# Patient Record
Sex: FEMALE | Race: WHITE | NOT HISPANIC OR LATINO
[De-identification: names, ages, dates, MRNs, and addresses within clinical notes are randomized per-mention and may not be internally consistent; named-entity substitution may affect disease eponyms.]

---

## 2017-01-01 ENCOUNTER — RECORDS - HEALTHEAST (OUTPATIENT)
Dept: ADMINISTRATIVE | Facility: OTHER | Age: 77
End: 2017-01-01

## 2017-01-01 ENCOUNTER — OFFICE VISIT - HEALTHEAST (OUTPATIENT)
Dept: PODIATRY | Facility: CLINIC | Age: 77
End: 2017-01-01

## 2017-01-01 ENCOUNTER — COMMUNICATION - HEALTHEAST (OUTPATIENT)
Dept: NURSING | Facility: CLINIC | Age: 77
End: 2017-01-01

## 2017-01-01 ENCOUNTER — OFFICE VISIT - HEALTHEAST (OUTPATIENT)
Dept: SLEEP MEDICINE | Facility: CLINIC | Age: 77
End: 2017-01-01

## 2017-01-01 ENCOUNTER — COMMUNICATION - HEALTHEAST (OUTPATIENT)
Dept: INTERNAL MEDICINE | Facility: CLINIC | Age: 77
End: 2017-01-01

## 2017-01-01 ENCOUNTER — AMBULATORY - HEALTHEAST (OUTPATIENT)
Dept: LAB | Facility: CLINIC | Age: 77
End: 2017-01-01

## 2017-01-01 ENCOUNTER — COMMUNICATION - HEALTHEAST (OUTPATIENT)
Dept: SCHEDULING | Facility: CLINIC | Age: 77
End: 2017-01-01

## 2017-01-01 ENCOUNTER — OFFICE VISIT - HEALTHEAST (OUTPATIENT)
Dept: INTERNAL MEDICINE | Facility: CLINIC | Age: 77
End: 2017-01-01

## 2017-01-01 ENCOUNTER — COMMUNICATION - HEALTHEAST (OUTPATIENT)
Dept: MEDSURG UNIT | Facility: CLINIC | Age: 77
End: 2017-01-01

## 2017-01-01 ENCOUNTER — HOSPITAL ENCOUNTER (OUTPATIENT)
Dept: CT IMAGING | Facility: CLINIC | Age: 77
Discharge: HOME | End: 2017-09-11
Attending: INTERNAL MEDICINE

## 2017-01-01 ENCOUNTER — COMMUNICATION - HEALTHEAST (OUTPATIENT)
Dept: TELEHEALTH | Facility: CLINIC | Age: 77
End: 2017-01-01

## 2017-01-01 ENCOUNTER — HOSPITAL ENCOUNTER (OUTPATIENT)
Dept: CARDIOLOGY | Facility: CLINIC | Age: 77
Discharge: HOME | End: 2017-06-23
Attending: INTERNAL MEDICINE

## 2017-01-01 DIAGNOSIS — I63.9 ISCHEMIC STROKE (H): ICD-10-CM

## 2017-01-01 DIAGNOSIS — R21 SKIN RASH: ICD-10-CM

## 2017-01-01 DIAGNOSIS — G40.209 COMPLEX PARTIAL SEIZURES (H): ICD-10-CM

## 2017-01-01 DIAGNOSIS — G40.201 PARTIAL SYMPTOMATIC EPILEPSY WITH COMPLEX PARTIAL SEIZURES, NOT INTRACTABLE, WITH STATUS EPILEPTICUS (H): ICD-10-CM

## 2017-01-01 DIAGNOSIS — I82.409 DVT (DEEP VENOUS THROMBOSIS) (H): ICD-10-CM

## 2017-01-01 DIAGNOSIS — I63.511 CEREBRAL INFARCTION INVOLVING RIGHT MIDDLE CEREBRAL ARTERY (H): ICD-10-CM

## 2017-01-01 DIAGNOSIS — R06.83 SNORING: ICD-10-CM

## 2017-01-01 DIAGNOSIS — F06.31 MOOD DISORDER WITH DEPRESSIVE FEATURES DUE TO GENERAL MEDICAL CONDITION: ICD-10-CM

## 2017-01-01 DIAGNOSIS — R29.818 SUSPECTED SLEEP APNEA: ICD-10-CM

## 2017-01-01 DIAGNOSIS — L60.2 ONYCHAUXIS: ICD-10-CM

## 2017-01-01 DIAGNOSIS — I48.0 PAROXYSMAL ATRIAL FIBRILLATION (H): ICD-10-CM

## 2017-01-01 DIAGNOSIS — G47.10 HYPERSOMNIA, UNSPECIFIED: ICD-10-CM

## 2017-01-01 DIAGNOSIS — G47.8 SLEEP DYSFUNCTION WITH SLEEP STAGE DISTURBANCE: ICD-10-CM

## 2017-01-01 DIAGNOSIS — D64.9 ANEMIA, UNSPECIFIED TYPE: ICD-10-CM

## 2017-01-01 DIAGNOSIS — R56.9 SEIZURE (H): ICD-10-CM

## 2017-01-01 DIAGNOSIS — G40.201 STATUS EPILEPTICUS DUE TO COMPLEX PARTIAL SEIZURE (H): ICD-10-CM
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
## 2017-01-01 DIAGNOSIS — R56.9 SEIZURES (H): ICD-10-CM

## 2017-01-01 DIAGNOSIS — Z23 IMMUNIZATION DUE: ICD-10-CM

## 2017-01-01 DIAGNOSIS — B35.1 NAIL FUNGUS: ICD-10-CM

## 2017-01-01 DIAGNOSIS — I10 ESSENTIAL HYPERTENSION: ICD-10-CM

## 2017-01-01 DIAGNOSIS — R50.9 FEVER: ICD-10-CM

## 2017-01-01 ASSESSMENT — MIFFLIN-ST. JEOR
SCORE: 1230.65
SCORE: 1230.65
SCORE: 1214.78
SCORE: 1219.31
SCORE: 1207.97
SCORE: 1207.97
SCORE: 1217.05
SCORE: 1198.9

## 2017-03-21 ENCOUNTER — APPOINTMENT (OUTPATIENT)
Dept: CT IMAGING | Facility: CLINIC | Age: 77
DRG: 061 | End: 2017-03-21
Attending: EMERGENCY MEDICINE
Payer: MEDICARE

## 2017-03-21 ENCOUNTER — APPOINTMENT (OUTPATIENT)
Dept: INTERVENTIONAL RADIOLOGY/VASCULAR | Facility: CLINIC | Age: 77
DRG: 061 | End: 2017-03-21
Attending: PSYCHIATRY & NEUROLOGY
Payer: MEDICARE

## 2017-03-21 ENCOUNTER — HOSPITAL ENCOUNTER (INPATIENT)
Facility: CLINIC | Age: 77
LOS: 10 days | Discharge: NURSING, REHAB OR LONG-TERM CARE | DRG: 061 | End: 2017-03-31
Attending: EMERGENCY MEDICINE | Admitting: INTERNAL MEDICINE
Payer: MEDICARE

## 2017-03-21 DIAGNOSIS — I51.7 ATRIAL DILATATION, LEFT: ICD-10-CM

## 2017-03-21 DIAGNOSIS — I63.411 CEREBRAL INFARCTION DUE TO EMBOLISM OF RIGHT MIDDLE CEREBRAL ARTERY (H): Primary | ICD-10-CM

## 2017-03-21 DIAGNOSIS — I10 BENIGN ESSENTIAL HYPERTENSION: ICD-10-CM

## 2017-03-21 DIAGNOSIS — E78.2 MIXED HYPERLIPIDEMIA: ICD-10-CM

## 2017-03-21 DIAGNOSIS — E55.9 VITAMIN D DEFICIENCY: ICD-10-CM

## 2017-03-21 DIAGNOSIS — G93.6 CEREBRAL EDEMA (H): ICD-10-CM

## 2017-03-21 DIAGNOSIS — I63.9 ACUTE ISCHEMIC STROKE (H): ICD-10-CM

## 2017-03-21 LAB
ANION GAP SERPL CALCULATED.3IONS-SCNC: 7 MMOL/L (ref 3–14)
APTT PPP: 29 SEC (ref 22–37)
BASOPHILS # BLD AUTO: 0 10E9/L (ref 0–0.2)
BASOPHILS NFR BLD AUTO: 0.7 %
BUN SERPL-MCNC: 23 MG/DL (ref 7–30)
CALCIUM SERPL-MCNC: 8.5 MG/DL (ref 8.5–10.1)
CHLORIDE SERPL-SCNC: 108 MMOL/L (ref 94–109)
CHOLEST SERPL-MCNC: 207 MG/DL
CO2 SERPL-SCNC: 28 MMOL/L (ref 20–32)
CREAT SERPL-MCNC: 0.8 MG/DL (ref 0.52–1.04)
CRP SERPL-MCNC: <2.9 MG/L (ref 0–8)
DIFFERENTIAL METHOD BLD: NORMAL
EOSINOPHIL # BLD AUTO: 0.1 10E9/L (ref 0–0.7)
EOSINOPHIL NFR BLD AUTO: 2 %
ERYTHROCYTE [DISTWIDTH] IN BLOOD BY AUTOMATED COUNT: 13.5 % (ref 10–15)
ERYTHROCYTE [SEDIMENTATION RATE] IN BLOOD BY WESTERGREN METHOD: 8 MM/H (ref 0–30)
GFR SERPL CREATININE-BSD FRML MDRD: 70 ML/MIN/1.7M2
GLUCOSE BLDC GLUCOMTR-MCNC: 120 MG/DL (ref 70–99)
GLUCOSE BLDC GLUCOMTR-MCNC: 157 MG/DL (ref 70–99)
GLUCOSE SERPL-MCNC: 167 MG/DL (ref 70–99)
HBA1C MFR BLD: 5.9 % (ref 4.3–6)
HCT VFR BLD AUTO: 40.4 % (ref 35–47)
HDLC SERPL-MCNC: 73 MG/DL
HGB BLD-MCNC: 13.7 G/DL (ref 11.7–15.7)
IMM GRANULOCYTES # BLD: 0 10E9/L (ref 0–0.4)
IMM GRANULOCYTES NFR BLD: 0.2 %
INR PPP: 0.95 (ref 0.86–1.14)
LDLC SERPL CALC-MCNC: 108 MG/DL
LYMPHOCYTES # BLD AUTO: 1.7 10E9/L (ref 0.8–5.3)
LYMPHOCYTES NFR BLD AUTO: 38.5 %
MCH RBC QN AUTO: 32.5 PG (ref 26.5–33)
MCHC RBC AUTO-ENTMCNC: 33.9 G/DL (ref 31.5–36.5)
MCV RBC AUTO: 96 FL (ref 78–100)
MONOCYTES # BLD AUTO: 0.3 10E9/L (ref 0–1.3)
MONOCYTES NFR BLD AUTO: 7.6 %
NEUTROPHILS # BLD AUTO: 2.3 10E9/L (ref 1.6–8.3)
NEUTROPHILS NFR BLD AUTO: 51 %
NONHDLC SERPL-MCNC: 134 MG/DL
NRBC # BLD AUTO: 0 10*3/UL
NRBC BLD AUTO-RTO: 0 /100
PLATELET # BLD AUTO: 187 10E9/L (ref 150–450)
POTASSIUM SERPL-SCNC: 3.8 MMOL/L (ref 3.4–5.3)
RBC # BLD AUTO: 4.22 10E12/L (ref 3.8–5.2)
SODIUM SERPL-SCNC: 143 MMOL/L (ref 133–144)
TRIGL SERPL-MCNC: 130 MG/DL
TROPONIN I SERPL-MCNC: <0.015 UG/L (ref 0–0.04)
TSH SERPL DL<=0.05 MIU/L-ACNC: 1.89 MU/L (ref 0.4–4)
WBC # BLD AUTO: 4.5 10E9/L (ref 4–11)

## 2017-03-21 PROCEDURE — 27210906 ZZH KIT CR8

## 2017-03-21 PROCEDURE — 70450 CT HEAD/BRAIN W/O DYE: CPT

## 2017-03-21 PROCEDURE — 86140 C-REACTIVE PROTEIN: CPT | Performed by: EMERGENCY MEDICINE

## 2017-03-21 PROCEDURE — C1769 GUIDE WIRE: HCPCS

## 2017-03-21 PROCEDURE — 25000128 H RX IP 250 OP 636: Performed by: EMERGENCY MEDICINE

## 2017-03-21 PROCEDURE — B3131ZZ FLUOROSCOPY OF RIGHT COMMON CAROTID ARTERY USING LOW OSMOLAR CONTRAST: ICD-10-PCS | Performed by: RADIOLOGY

## 2017-03-21 PROCEDURE — 27210806 ZZH SHEATH CR5

## 2017-03-21 PROCEDURE — 25000125 ZZHC RX 250

## 2017-03-21 PROCEDURE — 82607 VITAMIN B-12: CPT | Performed by: EMERGENCY MEDICINE

## 2017-03-21 PROCEDURE — 70498 CT ANGIOGRAPHY NECK: CPT

## 2017-03-21 PROCEDURE — 25500064 ZZH RX 255 OP 636: Performed by: EMERGENCY MEDICINE

## 2017-03-21 PROCEDURE — 80048 BASIC METABOLIC PNL TOTAL CA: CPT | Performed by: EMERGENCY MEDICINE

## 2017-03-21 PROCEDURE — 27211192 ZZ H SHEATH CR14

## 2017-03-21 PROCEDURE — 85025 COMPLETE CBC W/AUTO DIFF WBC: CPT | Performed by: EMERGENCY MEDICINE

## 2017-03-21 PROCEDURE — 25500064 ZZH RX 255 OP 636: Performed by: RADIOLOGY

## 2017-03-21 PROCEDURE — 25000125 ZZHC RX 250: Performed by: PSYCHIATRY & NEUROLOGY

## 2017-03-21 PROCEDURE — 85652 RBC SED RATE AUTOMATED: CPT | Performed by: EMERGENCY MEDICINE

## 2017-03-21 PROCEDURE — 27210732 ZZH ACCESSORY CR1

## 2017-03-21 PROCEDURE — 25000125 ZZHC RX 250: Performed by: EMERGENCY MEDICINE

## 2017-03-21 PROCEDURE — 80061 LIPID PANEL: CPT | Performed by: EMERGENCY MEDICINE

## 2017-03-21 PROCEDURE — 25000128 H RX IP 250 OP 636: Performed by: RADIOLOGY

## 2017-03-21 PROCEDURE — 83036 HEMOGLOBIN GLYCOSYLATED A1C: CPT | Performed by: EMERGENCY MEDICINE

## 2017-03-21 PROCEDURE — 27210804 ZZH SHEATH CR3

## 2017-03-21 PROCEDURE — 85610 PROTHROMBIN TIME: CPT | Performed by: EMERGENCY MEDICINE

## 2017-03-21 PROCEDURE — 99223 1ST HOSP IP/OBS HIGH 75: CPT | Mod: AI | Performed by: INTERNAL MEDICINE

## 2017-03-21 PROCEDURE — 20000003 ZZH R&B ICU

## 2017-03-21 PROCEDURE — 84484 ASSAY OF TROPONIN QUANT: CPT | Performed by: PSYCHIATRY & NEUROLOGY

## 2017-03-21 PROCEDURE — S0028 INJECTION, FAMOTIDINE, 20 MG: HCPCS | Performed by: INTERNAL MEDICINE

## 2017-03-21 PROCEDURE — 25000128 H RX IP 250 OP 636

## 2017-03-21 PROCEDURE — 84443 ASSAY THYROID STIM HORMONE: CPT | Performed by: EMERGENCY MEDICINE

## 2017-03-21 PROCEDURE — 25000125 ZZHC RX 250: Performed by: RADIOLOGY

## 2017-03-21 PROCEDURE — 3E03317 INTRODUCTION OF OTHER THROMBOLYTIC INTO PERIPHERAL VEIN, PERCUTANEOUS APPROACH: ICD-10-PCS | Performed by: EMERGENCY MEDICINE

## 2017-03-21 PROCEDURE — 70470 CT HEAD/BRAIN W/O & W/DYE: CPT | Mod: XS

## 2017-03-21 PROCEDURE — 93010 ELECTROCARDIOGRAM REPORT: CPT | Performed by: INTERNAL MEDICINE

## 2017-03-21 PROCEDURE — 25000125 ZZHC RX 250: Performed by: INTERNAL MEDICINE

## 2017-03-21 PROCEDURE — 00000146 ZZHCL STATISTIC GLUCOSE BY METER IP

## 2017-03-21 PROCEDURE — 82306 VITAMIN D 25 HYDROXY: CPT | Performed by: EMERGENCY MEDICINE

## 2017-03-21 PROCEDURE — 85730 THROMBOPLASTIN TIME PARTIAL: CPT | Performed by: EMERGENCY MEDICINE

## 2017-03-21 PROCEDURE — 93005 ELECTROCARDIOGRAM TRACING: CPT

## 2017-03-21 PROCEDURE — 36223 PLACE CATH CAROTID/INOM ART: CPT

## 2017-03-21 PROCEDURE — 27210742 ZZH CATH CR1

## 2017-03-21 PROCEDURE — 27210893 ZZH CATH CR5

## 2017-03-21 RX ORDER — HYDRALAZINE HYDROCHLORIDE 20 MG/ML
10 INJECTION INTRAMUSCULAR; INTRAVENOUS ONCE
Status: COMPLETED | OUTPATIENT
Start: 2017-03-21 | End: 2017-03-21

## 2017-03-21 RX ORDER — LABETALOL HYDROCHLORIDE 5 MG/ML
10-20 INJECTION, SOLUTION INTRAVENOUS EVERY 10 MIN PRN
Status: DISCONTINUED | OUTPATIENT
Start: 2017-03-21 | End: 2017-03-30

## 2017-03-21 RX ORDER — FENTANYL CITRATE 50 UG/ML
INJECTION, SOLUTION INTRAMUSCULAR; INTRAVENOUS
Status: COMPLETED
Start: 2017-03-21 | End: 2017-03-21

## 2017-03-21 RX ORDER — IOPAMIDOL 755 MG/ML
120 INJECTION, SOLUTION INTRAVASCULAR ONCE
Status: COMPLETED | OUTPATIENT
Start: 2017-03-21 | End: 2017-03-21

## 2017-03-21 RX ORDER — PROCHLORPERAZINE MALEATE 5 MG
5 TABLET ORAL EVERY 6 HOURS PRN
Status: DISCONTINUED | OUTPATIENT
Start: 2017-03-21 | End: 2017-03-31 | Stop reason: HOSPADM

## 2017-03-21 RX ORDER — METOCLOPRAMIDE 5 MG/1
5 TABLET ORAL EVERY 6 HOURS PRN
Status: DISCONTINUED | OUTPATIENT
Start: 2017-03-21 | End: 2017-03-31 | Stop reason: HOSPADM

## 2017-03-21 RX ORDER — HYDRALAZINE HYDROCHLORIDE 20 MG/ML
INJECTION INTRAMUSCULAR; INTRAVENOUS
Status: COMPLETED
Start: 2017-03-21 | End: 2017-03-21

## 2017-03-21 RX ORDER — ONDANSETRON 2 MG/ML
4 INJECTION INTRAMUSCULAR; INTRAVENOUS EVERY 6 HOURS PRN
Status: DISCONTINUED | OUTPATIENT
Start: 2017-03-21 | End: 2017-03-21

## 2017-03-21 RX ORDER — LIDOCAINE HYDROCHLORIDE 10 MG/ML
1-30 INJECTION, SOLUTION EPIDURAL; INFILTRATION; INTRACAUDAL; PERINEURAL
Status: COMPLETED | OUTPATIENT
Start: 2017-03-21 | End: 2017-03-21

## 2017-03-21 RX ORDER — POTASSIUM CHLORIDE 1500 MG/1
20-40 TABLET, EXTENDED RELEASE ORAL
Status: DISCONTINUED | OUTPATIENT
Start: 2017-03-21 | End: 2017-03-28

## 2017-03-21 RX ORDER — PROCHLORPERAZINE MALEATE 5 MG
5 TABLET ORAL EVERY 6 HOURS PRN
Status: DISCONTINUED | OUTPATIENT
Start: 2017-03-21 | End: 2017-03-21

## 2017-03-21 RX ORDER — IOPAMIDOL 612 MG/ML
150 INJECTION, SOLUTION INTRAVASCULAR ONCE
Status: COMPLETED | OUTPATIENT
Start: 2017-03-21 | End: 2017-03-21

## 2017-03-21 RX ORDER — HYDRALAZINE HYDROCHLORIDE 20 MG/ML
INJECTION INTRAMUSCULAR; INTRAVENOUS
Status: DISCONTINUED
Start: 2017-03-21 | End: 2017-03-21 | Stop reason: HOSPADM

## 2017-03-21 RX ORDER — POTASSIUM CHLORIDE 7.45 MG/ML
10 INJECTION INTRAVENOUS
Status: DISCONTINUED | OUTPATIENT
Start: 2017-03-21 | End: 2017-03-28

## 2017-03-21 RX ORDER — AMOXICILLIN 250 MG
1-2 CAPSULE ORAL 2 TIMES DAILY PRN
Status: DISCONTINUED | OUTPATIENT
Start: 2017-03-21 | End: 2017-03-31 | Stop reason: HOSPADM

## 2017-03-21 RX ORDER — ACETAMINOPHEN 10 MG/ML
1000 INJECTION, SOLUTION INTRAVENOUS ONCE
Status: COMPLETED | OUTPATIENT
Start: 2017-03-21 | End: 2017-03-21

## 2017-03-21 RX ORDER — SODIUM CHLORIDE 9 MG/ML
INJECTION, SOLUTION INTRAVENOUS CONTINUOUS
Status: DISCONTINUED | OUTPATIENT
Start: 2017-03-21 | End: 2017-03-30

## 2017-03-21 RX ORDER — BISACODYL 10 MG
10 SUPPOSITORY, RECTAL RECTAL DAILY PRN
Status: DISCONTINUED | OUTPATIENT
Start: 2017-03-21 | End: 2017-03-31 | Stop reason: HOSPADM

## 2017-03-21 RX ORDER — SODIUM CHLORIDE 9 MG/ML
INJECTION, SOLUTION INTRAVENOUS CONTINUOUS
Status: DISCONTINUED | OUTPATIENT
Start: 2017-03-21 | End: 2017-03-21 | Stop reason: ALTCHOICE

## 2017-03-21 RX ORDER — MAGNESIUM SULFATE HEPTAHYDRATE 40 MG/ML
4 INJECTION, SOLUTION INTRAVENOUS EVERY 4 HOURS PRN
Status: DISCONTINUED | OUTPATIENT
Start: 2017-03-21 | End: 2017-03-31 | Stop reason: HOSPADM

## 2017-03-21 RX ORDER — HYDRALAZINE HYDROCHLORIDE 20 MG/ML
10-20 INJECTION INTRAMUSCULAR; INTRAVENOUS EVERY 30 MIN PRN
Status: DISCONTINUED | OUTPATIENT
Start: 2017-03-21 | End: 2017-03-31 | Stop reason: HOSPADM

## 2017-03-21 RX ORDER — NALOXONE HYDROCHLORIDE 0.4 MG/ML
.1-.4 INJECTION, SOLUTION INTRAMUSCULAR; INTRAVENOUS; SUBCUTANEOUS
Status: DISCONTINUED | OUTPATIENT
Start: 2017-03-21 | End: 2017-03-21

## 2017-03-21 RX ORDER — ONDANSETRON 4 MG/1
4 TABLET, ORALLY DISINTEGRATING ORAL EVERY 6 HOURS PRN
Status: DISCONTINUED | OUTPATIENT
Start: 2017-03-21 | End: 2017-03-31 | Stop reason: HOSPADM

## 2017-03-21 RX ORDER — POTASSIUM CHLORIDE 29.8 MG/ML
20 INJECTION INTRAVENOUS
Status: DISCONTINUED | OUTPATIENT
Start: 2017-03-21 | End: 2017-03-28

## 2017-03-21 RX ORDER — ONDANSETRON 4 MG/1
4 TABLET, ORALLY DISINTEGRATING ORAL EVERY 6 HOURS PRN
Status: DISCONTINUED | OUTPATIENT
Start: 2017-03-21 | End: 2017-03-21

## 2017-03-21 RX ORDER — POTASSIUM CHLORIDE 1.5 G/1.58G
20-40 POWDER, FOR SOLUTION ORAL
Status: DISCONTINUED | OUTPATIENT
Start: 2017-03-21 | End: 2017-03-28

## 2017-03-21 RX ORDER — PROCHLORPERAZINE 25 MG
12.5 SUPPOSITORY, RECTAL RECTAL EVERY 12 HOURS PRN
Status: DISCONTINUED | OUTPATIENT
Start: 2017-03-21 | End: 2017-03-31 | Stop reason: HOSPADM

## 2017-03-21 RX ORDER — ACETAMINOPHEN 650 MG/1
650 SUPPOSITORY RECTAL EVERY 4 HOURS PRN
Status: DISCONTINUED | OUTPATIENT
Start: 2017-03-21 | End: 2017-03-31 | Stop reason: HOSPADM

## 2017-03-21 RX ORDER — FENTANYL CITRATE 50 UG/ML
25-50 INJECTION, SOLUTION INTRAMUSCULAR; INTRAVENOUS EVERY 5 MIN PRN
Status: DISCONTINUED | OUTPATIENT
Start: 2017-03-21 | End: 2017-03-21

## 2017-03-21 RX ORDER — LABETALOL HYDROCHLORIDE 5 MG/ML
10-20 INJECTION, SOLUTION INTRAVENOUS EVERY 10 MIN PRN
Status: DISCONTINUED | OUTPATIENT
Start: 2017-03-21 | End: 2017-03-21

## 2017-03-21 RX ORDER — LIDOCAINE 40 MG/G
CREAM TOPICAL
Status: DISCONTINUED | OUTPATIENT
Start: 2017-03-21 | End: 2017-03-31 | Stop reason: HOSPADM

## 2017-03-21 RX ORDER — PROCHLORPERAZINE 25 MG
12.5 SUPPOSITORY, RECTAL RECTAL EVERY 12 HOURS PRN
Status: DISCONTINUED | OUTPATIENT
Start: 2017-03-21 | End: 2017-03-21

## 2017-03-21 RX ORDER — METOCLOPRAMIDE HYDROCHLORIDE 5 MG/ML
5 INJECTION INTRAMUSCULAR; INTRAVENOUS EVERY 6 HOURS PRN
Status: DISCONTINUED | OUTPATIENT
Start: 2017-03-21 | End: 2017-03-31 | Stop reason: HOSPADM

## 2017-03-21 RX ORDER — NALOXONE HYDROCHLORIDE 0.4 MG/ML
.1-.4 INJECTION, SOLUTION INTRAMUSCULAR; INTRAVENOUS; SUBCUTANEOUS
Status: DISCONTINUED | OUTPATIENT
Start: 2017-03-21 | End: 2017-03-31 | Stop reason: HOSPADM

## 2017-03-21 RX ORDER — ONDANSETRON 2 MG/ML
4 INJECTION INTRAMUSCULAR; INTRAVENOUS EVERY 6 HOURS PRN
Status: DISCONTINUED | OUTPATIENT
Start: 2017-03-21 | End: 2017-03-31 | Stop reason: HOSPADM

## 2017-03-21 RX ORDER — FLUMAZENIL 0.1 MG/ML
0.2 INJECTION, SOLUTION INTRAVENOUS
Status: DISCONTINUED | OUTPATIENT
Start: 2017-03-21 | End: 2017-03-21

## 2017-03-21 RX ADMIN — HYDRALAZINE HYDROCHLORIDE 20 MG: 20 INJECTION INTRAMUSCULAR; INTRAVENOUS at 19:28

## 2017-03-21 RX ADMIN — FENTANYL CITRATE 50 MCG: 50 INJECTION, SOLUTION INTRAMUSCULAR; INTRAVENOUS at 15:11

## 2017-03-21 RX ADMIN — SODIUM CHLORIDE: 9 INJECTION, SOLUTION INTRAVENOUS at 17:17

## 2017-03-21 RX ADMIN — FENTANYL CITRATE 50 MCG: 50 INJECTION, SOLUTION INTRAMUSCULAR; INTRAVENOUS at 15:12

## 2017-03-21 RX ADMIN — FENTANYL CITRATE 25 MCG: 50 INJECTION, SOLUTION INTRAMUSCULAR; INTRAVENOUS at 15:35

## 2017-03-21 RX ADMIN — IOPAMIDOL 20 ML: 612 INJECTION, SOLUTION INTRAVENOUS at 15:48

## 2017-03-21 RX ADMIN — ONDANSETRON HYDROCHLORIDE 4 MG: 2 SOLUTION INTRAMUSCULAR; INTRAVENOUS at 17:13

## 2017-03-21 RX ADMIN — FAMOTIDINE 20 MG: 10 INJECTION, SOLUTION INTRAVENOUS at 20:03

## 2017-03-21 RX ADMIN — MIDAZOLAM HYDROCHLORIDE 0.5 MG: 1 INJECTION, SOLUTION INTRAMUSCULAR; INTRAVENOUS at 15:34

## 2017-03-21 RX ADMIN — HYDRALAZINE HYDROCHLORIDE 20 MG: 20 INJECTION INTRAMUSCULAR; INTRAVENOUS at 16:54

## 2017-03-21 RX ADMIN — ACETAMINOPHEN 1000 MG: 10 INJECTION, SOLUTION INTRAVENOUS at 20:06

## 2017-03-21 RX ADMIN — HYDRALAZINE HYDROCHLORIDE 10 MG: 20 INJECTION INTRAMUSCULAR; INTRAVENOUS at 15:39

## 2017-03-21 RX ADMIN — HYDRALAZINE HYDROCHLORIDE 10 MG: 20 INJECTION INTRAMUSCULAR; INTRAVENOUS at 23:46

## 2017-03-21 RX ADMIN — LIDOCAINE HYDROCHLORIDE 100 MG: 10 INJECTION, SOLUTION EPIDURAL; INFILTRATION; INTRACAUDAL; PERINEURAL at 15:13

## 2017-03-21 RX ADMIN — FENTANYL CITRATE 50 MCG: 50 INJECTION, SOLUTION INTRAMUSCULAR; INTRAVENOUS at 15:19

## 2017-03-21 RX ADMIN — IOPAMIDOL 120 ML: 755 INJECTION, SOLUTION INTRAVENOUS at 13:52

## 2017-03-21 RX ADMIN — MIDAZOLAM HYDROCHLORIDE 1 MG: 1 INJECTION, SOLUTION INTRAMUSCULAR; INTRAVENOUS at 15:09

## 2017-03-21 RX ADMIN — ALTEPLASE 7 MG: KIT at 14:21

## 2017-03-21 RX ADMIN — ALTEPLASE 66 MG: KIT at 14:24

## 2017-03-21 RX ADMIN — SODIUM CHLORIDE 100 ML: 9 INJECTION, SOLUTION INTRAVENOUS at 13:52

## 2017-03-21 ASSESSMENT — PAIN DESCRIPTION - DESCRIPTORS: DESCRIPTORS: ACHING

## 2017-03-21 ASSESSMENT — ACTIVITIES OF DAILY LIVING (ADL)
BATHING: 4-->COMPLETELY DEPENDENT
CHANGE_IN_FUNCTIONAL_STATUS_SINCE_ONSET_OF_CURRENT_ILLNESS/INJURY: YES
EATING: 4-->COMPLETELY DEPENDENT
DRESS: 4-->COMPLETELY DEPENDENT
TRANSFERRING: 4-->COMPLETELY DEPENDENT
BATHING: 0-->INDEPENDENT
COGNITION: 0 - NO COGNITION ISSUES REPORTED
DRESS: 0-->INDEPENDENT
RETIRED_COMMUNICATION: 0-->UNDERSTANDS/COMMUNICATES WITHOUT DIFFICULTY
AMBULATION: 4-->COMPLETELY DEPENDENT
TOILETING: 4-->COMPLETELY DEPENDENT
FALL_HISTORY_WITHIN_LAST_SIX_MONTHS: NO
COMMUNICATION: 2-->DIFFICULTY UNDERSTANDING (NOT RELATED TO LANGUAGE BARRIER)
RETIRED_EATING: 0-->INDEPENDENT
SWALLOWING: 0-->SWALLOWS FOODS/LIQUIDS WITHOUT DIFFICULTY
TRANSFERRING: 0-->INDEPENDENT
TOILETING: 0-->INDEPENDENT
AMBULATION: 0-->INDEPENDENT

## 2017-03-21 NOTE — ED NOTES
Bed: ST01  Expected date:   Expected time:   Means of arrival:   Comments:  435  75 F l sided weak/onset [phone]

## 2017-03-21 NOTE — ED PROVIDER NOTES
History     Chief Complaint:  Left-Sided Weakness     HPI The history is limited due to the patient's mental status changes and is obtained through EMS.     Marcia Kelley is a 76 year old female who presents via EMS for evaluation of left-sided weakness. This morning, the patient was shopping at [facility]. At 1250 while in the dressing room, the patient experienced the sudden onset of left-sided weakness, left-sided facial droop, confusion and altered mental status, and she fell to the ground. Staff at [facility] heard the patient fall and called EMS to evaluate the patient. On their evaluation, the patient was responsive but confused, could not move her left arm, and expressed no complaints. She had a blood sugar of 154 and 150 with EMS. Upon arrival here, the patient has persistent symptoms, expresses no complaints, and states that she is still at [facility].     Allergies:  NKDA     Medications:    The patient is not currently taking any prescribed medications.     Past Medical History:    History reviewed. No pertinent past medical history.     Past Surgical History:    History reviewed. No pertinent past surgical history.     Family History:    History reviewed. No pertinent family history.     Social History:  Tobacco use:    Never smoker  Alcohol use:    Positive   Accompanied to ED by:  EMS      Review of Systems   Unable to perform ROS: Mental status change     Physical Exam   First Vitals:    Physical Exam    Patient Vitals for the past 24 hrs:   BP Temp Temp src Pulse Heart Rate Resp SpO2 Height Weight   03/21/17 1618 - - - - 51 10 99 % - -   03/21/17 1615 166/82 - - - 52 13 99 % - -   03/21/17 1612 172/63 - - - 52 (!) 72 94 % - -   03/21/17 1540 141/79 - - - 44 14 94 % - -   03/21/17 1530 (!) 177/108 - - - 57 16 96 % - -   03/21/17 1520 169/87 - - - 44 14 95 % - -   03/21/17 1509 (!) 180/99 - - - 58 12 94 % - -   03/21/17 1500 (!) 206/117 - - - 71 12 96 % - -   03/21/17 1450 (!) 179/92 -  "- - 58 12 - - -   03/21/17 1445 (!) 184/103 - - - - - - - -   03/21/17 1433 - - - - 53 14 (!) 89 % - -   03/21/17 1430 166/90 - - - 58 16 100 % - -   03/21/17 1407 (!) 154/98 98.4  F (36.9  C) Oral 70 70 18 97 % - -   03/21/17 1400 (!) 154/98 - - - 71 14 96 % - -   03/21/17 1357 166/82 - - - - - 97 % - -   03/21/17 1300 - - - - - - - 1.676 m (5' 6\") 81.6 kg (179 lb 14.3 oz)       General: Alert and Interactive.   Head: No signs of trauma.   Mouth/Throat: Oropharynx is clear and moist.   Eyes: Conjunctivae are normal. Pupils are equal, round, and reactive to light.   Neck: Normal range of motion. No nuchal rigidity.   CV: Normal rate and regular rhythm.    Resp: Effort normal and breath sounds normal. No respiratory distress.   GI: Soft. There is no tenderness or guarding.   MSK: Normal range of motion. no edema.   Neuro: The patient is alert and oriented to person and time. PERRLA, No movement in left arm, Left-sided neglect, Lateral left gaze is limited, Clear speech  Sensation normal.  GCS eye subscore is 4. GCS verbal subscore is 5. GCS motor subscore is 6.   Skin: Skin is warm and dry. No rash noted.   Psych: normal mood and affect. Unusual apathy    Emergency Department Course   ECG (14:08:29):  Indication: Screening for cardiovascular disease.   Rate 69 bpm. AK interval 166 ms. QRS duration 112 ms. QT/QTc 442/473 ms. P-R-T axes 60 -52 66.   Interpretation: Sinus rhythm with occasional premature ventricular complexes, Left axis deviation, Minimal voltage criteria for LVH may be normal variant, Inferior infarct age undetermined, Anterior infarct age undetermined, Abnormal ECG  Agree with computer interpretation. Yes  Interpreted at 1413 by Dr. Amezcua.      Imaging:  Radiographic findings were communicated with the patient who voiced understanding of the findings.    CT Head w Contrast:  IMPRESSION: Moderate to large right posterior division middle cerebral artery territory matched defect consistent with " infarct.  Per radiology.     CTA Angiogram Head Neck:   IMPRESSION: Filling defect versus short segment high-grade stenosis involving the distal right M1 and proximal M2 branch with poor filling of a right M2 branch. Finding is probably due to a thromboembolus.  Preliminary report per radiology.     CT Head w/o Contrast:  IMPRESSION: Minimal chronic changes. No evidence for intracranial hemorrhage or any acute process.  Per radiology.     Laboratory:  CBC: WNL (WBC 4.5, HGB 13.7, )  BMP: Glucose 167 high, o/w WNL (Creatinine 0.80)    INR: 0.95  Partial thromboplastin time: 29     Interventions:  1421 Activase 7 mg IV   1424 Activase 66 mg IV     Emergency Department Course:  Patient was brought to the ED via EMS.     Nursing notes and vitals reviewed.  1323: I performed an exam of the patient as documented above.     1325: Code stroke initiated     1329: I spoke with Dr. Murdock of the neurology service regarding patient's presentation, findings, and plan of care.     1422: I spoke with Dr. Jj Allen of the interventional radiology service regarding patient's presentation, findings, and plan of care.    1513: I spoke with Dr. Moore of the hospitalist service regarding patient's presentation, findings, and plan of care.    The patient will be taken to the IR suite by Dr. Allen for definitive treatment and will be admitted to the ICU under Dr. Moore.     Impression & Plan      CMS Diagnoses: The patient has stroke symptoms:           ED Stroke specific documentation           NIHSS PDF          Protocol PDF     Patient last known well time: 1250  ED Provider first to bedside at: 1323  CT Results received at: 1402  Patient was treated with TPA.  The risks and benefits of TPA were reviewed using the risk information document.  These risks included bleeding complications such as intracranial bleeding and death. The patient or patient s surrogate s decisional capacity was assessed to be intact. TPA  decision was made after this informed consent.    National Institutes of Health Stroke Scale (Baseline)  Time Performed: 1323      Score    Level of consciousness: (0)   Alert, keenly responsive    LOC questions: (0)   Answers both questions correctly    LOC commands: (0)   Performs both tasks correctly    Best gaze: (1)   Partial gaze palsy    Visual: (0)   No visual loss    Facial palsy: (3)   Complete paralysis of one or both sides    Motor arm (left): (4)   No movement    Motor arm (right): (0)   No drift    Motor leg (left): (2)   Some effort against gravity    Motor leg (right): (0)   No drift    Limb ataxia: (1)   Present in one limb    Sensory: (0)   Normal- no sensory loss    Best language: (0)   Normal- no aphasia    Dysarthria: (0)   Normal    Extinction and inattention: (1)   Visual, tacile, auditory, spatial, person inattention        Total Score:  12        Stroke Mimics were considered (including migraine headache, seizure disorder, hypoglycemia (or hyperglycemia), head or spinal trauma, CNS infection, Toxin ingestion and shock state (e.g. sepsis) .      Medical Decision Making:  Marcia Kelley is a 76 year old female who is presenting to the ER with symptoms consistent with an acute stroke causing left-sided symptoms. The patient was quickly evaluated and a code stroke was called. The perfusion studies confirmed a large infarct. Thrombolytics indicated with no contraindications. Dr. Murdock from neurology spoke with the patient's family and consented the patient for thrombolytics. They made the decision to try thrombolytics to improve the patient's symptoms. They are aware of the risks including bleeding. After angiogram was obtained and once M2 and M3 segments looked concerning for persistent clot, recommendation was made for IR thrombectomy. I spoke with Dr. Elia Allen, who was willing to take the patient to the operating suite for definitive treatment. The patient was transferred to IR. I then  made arrangements for the patient to be admitted from there to the ICU under the care of Dr. Moore.     Diagnosis:    ICD-10-CM   1. Acute ischemic stroke (H) I63.9       Disposition:  The patient will be taken to the IR suite by Dr. Allen for definitive treatment and will be admitted to the ICU under Dr. Moore.     I, Supa Moon, am serving as a scribe at 1:23 PM on 3/21/2017 to document services personally performed by Dr. Amezcua, based on my observations and the provider's statements to me.      EMERGENCY DEPARTMENT       Brennon Amezcua MD  03/21/17 1084

## 2017-03-21 NOTE — PROCEDURES
Interventional Neuroradiology Post Procedure    Patient Name: Marcia Kelley  MRN: [id number]  Date of Procedure: March 21, 2017    Procedure: Carotid and cerebral angiogram  Radiologist: Ilia Snyder    Contrast: 20 ml Isovue 320  Fluoro Time: 9.0 minutes  Air Kerma: 639 mGy  Medications: Versed 3.5 mg IV                       Fentanyl 175 mcg IV  Sedation Time: 30 minutes    EBL: min  Complications: none    Preliminary Report:   (See dictation for full detail)  Complete occlusion of posterior division of R MCA, anterior division patent  Arch tortuosity with significant 360 degree loop in prox R CCA. Only able to advance guide catheter to proximal portion of loop which is insufficiently distal for mechanical thrombectomy. No thrombectomy performed.    Assess/Plan:  7 Fr sheath in place. Out in a.m., bedrest while sheath in place  Report to Dr. Mathieu Snyder MD  [phone]

## 2017-03-21 NOTE — PROGRESS NOTES
Interventional Radiology Intra-procedural Nursing Note    Patient Name: Marcia Kelley  Medical Record Number: [id number]  Today's Date: March 21, 2017    Start Time:  1511  End of procedure time: 1555  Procedure:  Cerebral Angiogram with stroke intervention  Report given to:  Masha ICU RN  Time pt departs:  1558  :  N/A    Other Notes:   Patient into IR#3 at 1445.  Informed consent obtained by MD Megan and signed by patient .   Patient anxious about procedure.  Patient prepped and draped in supine position with 2% Chlorhexidine.  Left arm weak and right gaze noted.  Patient slow to respond but A & O.  Rhythm initially questionable afib and converted to SB at 1509.    IV Alteplase completed at 1516.  Course of events:  Sheath Time: 1516, Clot ID: [id number].  No thrombectomy performed.  7Fr  RFA sheath site C/D/I post-procedure with small hematoma noted.  Peripheral pulses palpable.  Sheath to be removed in am 3/22.  Patient received 3.5 mg Versed and 175 mcg Fentanyl for sedation during procedure.  Transported to ICU post-procedure by flying squad RN and transport.    Jolie Lunsford, RN, BSN, CCRN

## 2017-03-21 NOTE — CONSULTS
"Neurology Consultation Note          Assessment and Plan:     Right MCA occlusion and stroke syndrome    Hypertension    Within window for IV tPA. Unknown risk factors at this point, possibly embolic. Patient is not a reliable source of information and  also denies that she has any chronic problems. She has been on one aspirin daily.  is at home 40 minutes away, so I called him and discussed the curent diagnosis and findings and recommended treatment. I quoted him and the patient a 30% chance of improved outcome vs. A 4% risk of life threatening hemorrhage. Considering the right M3 occlusion mechanical thrombectomy following IV thrombolysis may be an option if no resolution of the clot. Discussed all these in detail over the phone with her  who agreed to the treatment and he is on his way in.     4:30 pm: Patient had IV tPA without much improvement so she was taken to the specials suite and Dr. Snyder attempted mechanical thrombectomy, but due to a carotid loop he could not reach far enough to where the clot was. The patient's symptoms are at this point unchanged. Additional information was obtained from the patient's . The patient \"does not believe that doctors can help with anything\" so she has seen her PMD very sporadically and has never complied with recommendations. She has early cardiovascular disease on both sides of the family.    Total critical care time 90 minutes: patient in critical condition with life and severe disability threatening acute illness. Proposed and implemented treatments carry a high risk of mortality and morbidity. Reviewed all imaging studies and conferred with ED staff and INR staff. Explained situation and proposed treatments and risks to patient and her .         History of Present Illness:   I am asked to provide urgent neurological consultation for this 76 year old lady who presented with acute stroke symptoms starting at 12:50 pm earlier today. " She apparently was at a bridal store, when she was seen collapsing and paramedics were called, who found her cognizant, but with left sided weakness. She was brought into the ED and a head CT was normal, but CT with contrast showed a large portion of the MCA being hypoperfused, with a occlusion vs. High grade stenosis in the right posterior branch of the MCA. We do not have much information about her medical history. She says she has no chronic problems or taking medications besides one aspirin a day. Her  confirms that over the phone. She provided CHARLES to acces her [facility] record, which is the only information available through care everywhere, but sarah was only optometry information there.          Review of Systems:   Review of systems not obtained due to patient factors - critical condition          Past Medical History:   As above details are not available. She denies any chronic illnesses         Past Surgical History:   Same as above.         Medications:       alteplase  7 mg Intravenous Once    Followed by     alteplase  66 mg Intravenous Once     sodium chloride 0.9%  100 mL Intravenous Once     - MEDICATION INSTRUCTIONS -                   Allergies:   None known         Family History:   Denies cardiac or stroke or seizure family history.  states taht her father  of heart attack in his sixties and her mother from same cause in her early 70's.         Social History:    She is , lives with  and has adult children  Social History     Social History     Marital status: N/A     Spouse name: N/A     Number of children: N/A     Years of education: N/A     Occupational History     Not on file.     Social History Main Topics     Smoking status: Never Smoker     Smokeless tobacco: Not on file     Alcohol use Yes     Drug use: No     Sexual activity: Not on file     Other Topics Concern     Not on file     Social History Narrative     No narrative on file                 Physical  "Exam:   Vitals were reviewed  Blood pressure (!) 154/98, pulse 70, resp. rate 18, height 1.676 m (5' 6\"), weight 81.6 kg (179 lb 14.3 oz), SpO2 97 %.  Wt Readings from Last 4 Encounters:   17 81.6 kg (179 lb 14.3 oz)     Temperatures:  Current -  ; Max - No Data Recorded  Blood pressure range: Systolic (24hrs), Av , Min:154 , Max:154   ; Diastolic (24hrs), Av, Min:98, Max:98       General appearance:  Obviously having left sided neglect.  Cardiovascular:  NORMAL - regular rate and rhythm without murmur. and carotids with brisk upstroke/without bruit bilaterally  easily palpated bilaterally  Neurologic: NIHSS score is 11   Mental Status Exam:   Level of Alertness:   awake  Orientation:   normal to person, place, time  Memory:   normal for age  Fund of Knowledge:  normal  Attention/Concentration:  Left hemiinattention and neglect; does not recognize deficits.  Language:  normal  Cranial Nerves: Visual fields: left homonymous hemianopsia; EOM: intact; Pupils: PERRL; Fundi: No papilledema; V, VII: left sided deficits in both; tongue, palate: to the left; shoulder and neck muscle strength: less on left.  Motor Exam:  moves all extremities, but there is drift on the left.  Sensory:  Left hemihypesthesia to all modalities.  Coordination: finger/Nose:  right:  normal; left:  ataxic; heel-Knee-Shin:  right:  normal; left:  ataxic.  Gait: unable..  Deep Tendon Reflexes:  reflexes are intact and symmetrical; plantar response:  Right: flexor; left: flexor.              Data:   All laboratory and imaging data reviewed by me:  Results for orders placed or performed during the hospital encounter of 17 (from the past 24 hour(s))   Basic metabolic panel   Result Value Ref Range    Sodium 143 133 - 144 mmol/L    Potassium 3.8 3.4 - 5.3 mmol/L    Chloride 108 94 - 109 mmol/L    Carbon Dioxide 28 20 - 32 mmol/L    Anion Gap 7 3 - 14 mmol/L    Glucose 167 (H) 70 - 99 mg/dL    Urea Nitrogen 23 7 - 30 mg/dL    " Creatinine 0.80 0.52 - 1.04 mg/dL    GFR Estimate 70 >60 mL/min/1.7m2    GFR Estimate If Black 84 >60 mL/min/1.7m2    Calcium 8.5 8.5 - 10.1 mg/dL   CBC with platelets differential   Result Value Ref Range    WBC 4.5 4.0 - 11.0 10e9/L    RBC Count 4.22 3.8 - 5.2 10e12/L    Hemoglobin 13.7 11.7 - 15.7 g/dL    Hematocrit 40.4 35.0 - 47.0 %    MCV 96 78 - 100 fl    MCH 32.5 26.5 - 33.0 pg    MCHC 33.9 31.5 - 36.5 g/dL    RDW 13.5 10.0 - 15.0 %    Platelet Count 187 150 - 450 10e9/L    Diff Method Automated Method     % Neutrophils 51.0 %    % Lymphocytes 38.5 %    % Monocytes 7.6 %    % Eosinophils 2.0 %    % Basophils 0.7 %    % Immature Granulocytes 0.2 %    Nucleated RBCs 0 0 /100    Absolute Neutrophil 2.3 1.6 - 8.3 10e9/L    Absolute Lymphocytes 1.7 0.8 - 5.3 10e9/L    Absolute Monocytes 0.3 0.0 - 1.3 10e9/L    Absolute Eosinophils 0.1 0.0 - 0.7 10e9/L    Absolute Basophils 0.0 0.0 - 0.2 10e9/L    Abs Immature Granulocytes 0.0 0 - 0.4 10e9/L    Absolute Nucleated RBC 0.0    INR   Result Value Ref Range    INR 0.95 0.86 - 1.14   Partial thromboplastin time   Result Value Ref Range    PTT 29 22 - 37 sec   CT Head w/o Contrast    Narrative    CT SCAN OF THE HEAD WITHOUT CONTRAST   3/21/2017 1:45 PM     HISTORY: Stroke.    TECHNIQUE: Axial images of the head and coronal reformations without  IV contrast material. Radiation dose for this scan was reduced using  automated exposure control, adjustment of the mA and/or kV according  to patient size, or iterative reconstruction technique.    COMPARISON: None.    FINDINGS: There is some borderline mild cerebral atrophy. Minimal  nonspecific white matter changes are present in both hemispheres  without mass effect. There is no evidence for intracranial hemorrhage,  mass effect, acute infarct, or a skull fracture.      Impression    IMPRESSION: Minimal chronic changes. No evidence for intracranial  hemorrhage or any acute process.

## 2017-03-22 ENCOUNTER — APPOINTMENT (OUTPATIENT)
Dept: INTERVENTIONAL RADIOLOGY/VASCULAR | Facility: CLINIC | Age: 77
DRG: 061 | End: 2017-03-22
Attending: RADIOLOGY
Payer: MEDICARE

## 2017-03-22 ENCOUNTER — APPOINTMENT (OUTPATIENT)
Dept: CARDIOLOGY | Facility: CLINIC | Age: 77
DRG: 061 | End: 2017-03-22
Attending: PSYCHIATRY & NEUROLOGY
Payer: MEDICARE

## 2017-03-22 ENCOUNTER — APPOINTMENT (OUTPATIENT)
Dept: MRI IMAGING | Facility: CLINIC | Age: 77
DRG: 061 | End: 2017-03-22
Attending: PSYCHIATRY & NEUROLOGY
Payer: MEDICARE

## 2017-03-22 LAB
ALBUMIN SERPL-MCNC: 3 G/DL (ref 3.4–5)
ALP SERPL-CCNC: 67 U/L (ref 40–150)
ALT SERPL W P-5'-P-CCNC: 16 U/L (ref 0–50)
ANION GAP SERPL CALCULATED.3IONS-SCNC: 9 MMOL/L (ref 3–14)
AST SERPL W P-5'-P-CCNC: 22 U/L (ref 0–45)
BILIRUB SERPL-MCNC: 0.4 MG/DL (ref 0.2–1.3)
BUN SERPL-MCNC: 16 MG/DL (ref 7–30)
CALCIUM SERPL-MCNC: 8 MG/DL (ref 8.5–10.1)
CHLORIDE SERPL-SCNC: 113 MMOL/L (ref 94–109)
CO2 SERPL-SCNC: 24 MMOL/L (ref 20–32)
CREAT SERPL-MCNC: 0.67 MG/DL (ref 0.52–1.04)
DEPRECATED CALCIDIOL+CALCIFEROL SERPL-MC: 16 UG/L (ref 20–75)
ERYTHROCYTE [DISTWIDTH] IN BLOOD BY AUTOMATED COUNT: 13.7 % (ref 10–15)
GFR SERPL CREATININE-BSD FRML MDRD: 86 ML/MIN/1.7M2
GLUCOSE BLDC GLUCOMTR-MCNC: 145 MG/DL (ref 70–99)
GLUCOSE BLDC GLUCOMTR-MCNC: 168 MG/DL (ref 70–99)
GLUCOSE SERPL-MCNC: 174 MG/DL (ref 70–99)
HBA1C MFR BLD: 5.7 % (ref 4.3–6)
HCT VFR BLD AUTO: 36.4 % (ref 35–47)
HGB BLD-MCNC: 12.2 G/DL (ref 11.7–15.7)
INR PPP: 0.98 (ref 0.86–1.14)
MCH RBC QN AUTO: 31.7 PG (ref 26.5–33)
MCHC RBC AUTO-ENTMCNC: 33.5 G/DL (ref 31.5–36.5)
MCV RBC AUTO: 95 FL (ref 78–100)
PLATELET # BLD AUTO: 182 10E9/L (ref 150–450)
POTASSIUM SERPL-SCNC: 3.4 MMOL/L (ref 3.4–5.3)
PROT SERPL-MCNC: 5.9 G/DL (ref 6.8–8.8)
RBC # BLD AUTO: 3.85 10E12/L (ref 3.8–5.2)
SODIUM SERPL-SCNC: 146 MMOL/L (ref 133–144)
TROPONIN I SERPL-MCNC: 0.03 UG/L (ref 0–0.04)
VIT B12 SERPL-MCNC: 323 PG/ML (ref 193–986)
WBC # BLD AUTO: 7.9 10E9/L (ref 4–11)

## 2017-03-22 PROCEDURE — 25500064 ZZH RX 255 OP 636: Performed by: INTERNAL MEDICINE

## 2017-03-22 PROCEDURE — 25000125 ZZHC RX 250: Performed by: PSYCHIATRY & NEUROLOGY

## 2017-03-22 PROCEDURE — 20000003 ZZH R&B ICU

## 2017-03-22 PROCEDURE — 93306 TTE W/DOPPLER COMPLETE: CPT | Mod: 26 | Performed by: INTERNAL MEDICINE

## 2017-03-22 PROCEDURE — 70553 MRI BRAIN STEM W/O & W/DYE: CPT

## 2017-03-22 PROCEDURE — 83036 HEMOGLOBIN GLYCOSYLATED A1C: CPT | Performed by: INTERNAL MEDICINE

## 2017-03-22 PROCEDURE — 40000264 ECHO COMPLETE BUBBLE STUDY WITH OPTISON

## 2017-03-22 PROCEDURE — 40000333 IR DISCONTINUE SHEATH: Mod: TC

## 2017-03-22 PROCEDURE — S0028 INJECTION, FAMOTIDINE, 20 MG: HCPCS | Performed by: INTERNAL MEDICINE

## 2017-03-22 PROCEDURE — 99233 SBSQ HOSP IP/OBS HIGH 50: CPT | Performed by: HOSPITALIST

## 2017-03-22 PROCEDURE — A9585 GADOBUTROL INJECTION: HCPCS | Performed by: INTERNAL MEDICINE

## 2017-03-22 PROCEDURE — 85027 COMPLETE CBC AUTOMATED: CPT | Performed by: INTERNAL MEDICINE

## 2017-03-22 PROCEDURE — 25000128 H RX IP 250 OP 636: Performed by: RADIOLOGY

## 2017-03-22 PROCEDURE — 84484 ASSAY OF TROPONIN QUANT: CPT | Performed by: INTERNAL MEDICINE

## 2017-03-22 PROCEDURE — 85610 PROTHROMBIN TIME: CPT | Performed by: INTERNAL MEDICINE

## 2017-03-22 PROCEDURE — 25000125 ZZHC RX 250: Performed by: INTERNAL MEDICINE

## 2017-03-22 PROCEDURE — 80053 COMPREHEN METABOLIC PANEL: CPT | Performed by: INTERNAL MEDICINE

## 2017-03-22 PROCEDURE — 25000128 H RX IP 250 OP 636: Performed by: INTERNAL MEDICINE

## 2017-03-22 PROCEDURE — 00000146 ZZHCL STATISTIC GLUCOSE BY METER IP

## 2017-03-22 RX ORDER — DEXTROSE MONOHYDRATE 25 G/50ML
25-50 INJECTION, SOLUTION INTRAVENOUS
Status: DISCONTINUED | OUTPATIENT
Start: 2017-03-22 | End: 2017-03-31 | Stop reason: HOSPADM

## 2017-03-22 RX ORDER — NICOTINE POLACRILEX 4 MG
15-30 LOZENGE BUCCAL
Status: DISCONTINUED | OUTPATIENT
Start: 2017-03-22 | End: 2017-03-31 | Stop reason: HOSPADM

## 2017-03-22 RX ORDER — GADOBUTROL 604.72 MG/ML
8 INJECTION INTRAVENOUS ONCE
Status: COMPLETED | OUTPATIENT
Start: 2017-03-22 | End: 2017-03-22

## 2017-03-22 RX ADMIN — SODIUM CHLORIDE: 9 INJECTION, SOLUTION INTRAVENOUS at 06:43

## 2017-03-22 RX ADMIN — HYDRALAZINE HYDROCHLORIDE 20 MG: 20 INJECTION INTRAMUSCULAR; INTRAVENOUS at 03:40

## 2017-03-22 RX ADMIN — HUMAN ALBUMIN MICROSPHERES AND PERFLUTREN 7 ML: 10; .22 INJECTION, SOLUTION INTRAVENOUS at 11:15

## 2017-03-22 RX ADMIN — HYDRALAZINE HYDROCHLORIDE 20 MG: 20 INJECTION INTRAMUSCULAR; INTRAVENOUS at 20:12

## 2017-03-22 RX ADMIN — HYDRALAZINE HYDROCHLORIDE 10 MG: 20 INJECTION INTRAMUSCULAR; INTRAVENOUS at 13:20

## 2017-03-22 RX ADMIN — HYDRALAZINE HYDROCHLORIDE 20 MG: 20 INJECTION INTRAMUSCULAR; INTRAVENOUS at 18:26

## 2017-03-22 RX ADMIN — FAMOTIDINE 20 MG: 10 INJECTION, SOLUTION INTRAVENOUS at 09:56

## 2017-03-22 RX ADMIN — HYDRALAZINE HYDROCHLORIDE 20 MG: 20 INJECTION INTRAMUSCULAR; INTRAVENOUS at 18:54

## 2017-03-22 RX ADMIN — GADOBUTROL 8 ML: 604.72 INJECTION INTRAVENOUS at 12:31

## 2017-03-22 RX ADMIN — HYDRALAZINE HYDROCHLORIDE 10 MG: 20 INJECTION INTRAMUSCULAR; INTRAVENOUS at 16:36

## 2017-03-22 RX ADMIN — ONDANSETRON 4 MG: 2 INJECTION INTRAMUSCULAR; INTRAVENOUS at 18:54

## 2017-03-22 RX ADMIN — SODIUM CHLORIDE: 9 INJECTION, SOLUTION INTRAVENOUS at 18:11

## 2017-03-22 RX ADMIN — FAMOTIDINE 20 MG: 10 INJECTION, SOLUTION INTRAVENOUS at 21:25

## 2017-03-22 ASSESSMENT — PAIN DESCRIPTION - DESCRIPTORS
DESCRIPTORS: ACHING
DESCRIPTORS: HEADACHE
DESCRIPTORS: ACHING

## 2017-03-22 NOTE — PROGRESS NOTES
Asked by nursing staff to assist in removal of hard contact lenses.  B/l lenses removed by slipping lower lid under lower aspect of lid while fixing upper aspect under upper lid.    Pt also complained of head pain.  Unable to take PO, pt and family requesting that she not have to have rectal medication administered right now.  Will order one-time dose of IV tylenol.

## 2017-03-22 NOTE — IR NOTE
Hemostasis achieved.  Right grion puncture site covered with a Tegaderm and Quick Clot gauze.  Dressing appears clean, dry and intact at this time.  Puncture site feels soft to palpation.  No complications observed.

## 2017-03-22 NOTE — PLAN OF CARE
Problem: Goal Outcome Summary  Goal: Goal Outcome Summary  SLP: Bedside swallow eval orders received. Per discussion with RN pt not appropriate for swallow eval d/t inability to be positioned upright 6 hours post Fr sheath removal. Will re-schedule and follow up tomorrow as appropriate.

## 2017-03-22 NOTE — PLAN OF CARE
Problem: Individualization  Goal: Patient Preferences  Outcome: No Change  Pt came from IR at 1600. Hematoma noted around sheath- marked, throughout shift, softened area. Total left neglect, reflexes positive on left side. Left facial droop. Preliminary stroke education started, education booklets given to family. NIHSS is 22. Pt is very slow to response, clear speech. BP elevated- gave hydralazine, effective. Weaned down to 2L NC, O2 high 90s. Family present.

## 2017-03-22 NOTE — PROGRESS NOTES
SPIRITUAL HEALTH SERVICES  Spiritual Assessment Progress Note  [facility] ICU   met with pt's  and daughter.  Family shared that pt was shopping for a dress for grandson's wedding in April when stroke occurred.   Family concerned with the uncertainty of pt's future and are awaiting test results.     Pt has a  and 6 children - some live out of state.       Pt is Mu-[employer] and they would like hospital  to come and give pt the sacrament of the sick.       provided support and placed referral for .       will continue to follow.                                                                                                                                                   Valerie Zaidi M.Div., [facility]  Staff    Pager [phone]

## 2017-03-22 NOTE — PROGRESS NOTES
"[facility]  Hospitalist Progress Note        Dipak Lucero, DO  2017        Interval History:      Planning for MRI today.          Assessment and Plan:        Marcia Kelley is a 76 year old female with no known significant medical hx except for possible HTN and is on a baby aspirin daily who presents with sudden onset left sided weakness and collapse.      Acute right MCA territory stroke Associated with sudden onset left sided weakness, facial droop, slurred speech, confusion and collapsed. CTA as below. S/p iv tPA and and IR thrombectomy was attempted but was unsuccessful due to the anatomy of her prox R CCA with tortuosity and a significant 360 degree loop.     - S/p iv tPA.    - Appreciate Neurology consultation.    - IVF.    - ASA/AC per Neurology discretion.    - Keep SBP < 180 and DBP < 105. IV prn's and iv Nicardipine available if needed.    - Therapies.    - MRI today.    - Watch for midline shift, may need interval imaging if clinically indicated.      Vitamin D deficiency: Returned low on 3/22.    - Vitamin D supplementation.     Possible HTN She denies any medical hx but her  reports a longstanding hx of HTN. She does not go to doctors as she thinks they are \"of no use\" according to her . BP mildly elevated her in the ER.    - Will monitor and treat as appropriate based on the tPA protocol for now.      DVT Prophylaxis: Pneumatic Compression Devices    Code: Full Code     Disposition: Pending clinical course.                    Physical Exam:      Heart Rate: 82    Blood pressure 130/61, pulse 70, temperature 98.4  F (36.9  C), temperature source Axillary, resp. rate 22, height 1.676 m (5' 6\"), weight 80.1 kg (176 lb 9.4 oz), SpO2 95 %.    Vitals:    17 1300 17 1649   Weight: 81.6 kg (179 lb 14.3 oz) 80.1 kg (176 lb 9.4 oz)       Vital Sign Ranges  Temperature Temp  Av.1  F (36.7  C)  Min: 97.5  F (36.4  C)  Max: 98.4  F (36.9  C)   Blood " pressure Systolic (24hrs), Av , Min:104 , Max:206        Diastolic (24hrs), Av, Min:47, Max:117      Pulse Pulse  Av  Min: 70  Max: 70   Respirations Resp  Av.9  Min: 7  Max: 66   Pulse oximetry SpO2  Av.1 %  Min: 86 %  Max: 100 %     Vital Signs with Ranges  Temp:  [97.5  F (36.4  C)-98.4  F (36.9  C)] 98.4  F (36.9  C)  Pulse:  [70] 70  Heart Rate:  [44-89] 82  Resp:  [7-66] 22  BP: (104-206)/() 130/61  MAP:  [73 mmHg-135 mmHg] 97 mmHg  Arterial Line BP: (3-211)/(0-85) 156/63  SpO2:  [86 %-100 %] 95 %    I/O Last 3 Shifts:   I/O last 3 completed shifts:  In: 53.75 [I.V.:53.75]  Out: 1300 [Urine:1300]    I/O past 24 hours:     Intake/Output Summary (Last 24 hours) at 17 0912  Last data filed at 17 0600   Gross per 24 hour   Intake            53.75 ml   Output             1300 ml   Net         -1246.25 ml     GENERAL: NAD. Somnolent.   HEENT: Normocephalic. Nares normal.   LUNGS: Clear to auscultation. No dyspnea at rest.   HEART: Regular rate. Extremities perfused.   ABDOMEN: Soft, nontender, and nondistended. Positive bowel sounds.   EXTREMITIES: No LE edema noted.   NEUROLOGIC: Weakness and slurred speech. Unable to follow commands.          Prior to Admission Medications:        Prescriptions Prior to Admission   Medication Sig Dispense Refill Last Dose     ASPIRIN PO Take 81 mg by mouth At Bedtime    3/20/2017 at             Medications:        Current Facility-Administered Medications   Medication Last Rate     - MEDICATION INSTRUCTIONS -       NaCl 75 mL/hr at 17 0643     - MEDICATION INSTRUCTIONS -       - MEDICATION INSTRUCTIONS -       niCARdipine 40 mg in 200 mL 0.9% NaCl Stopped (17)     - MEDICATION INSTRUCTIONS -       - MEDICATION INSTRUCTIONS -       Current Facility-Administered Medications   Medication Dose Route Frequency     sodium chloride (PF)  3 mL Intracatheter Q8H     famotidine  20 mg Intravenous BID     Current  Facility-Administered Medications   Medication Dose Route Frequency     - MEDICATION INSTRUCTIONS -   Does not apply Continuous PRN     HOLD MEDICATION   Does not apply HOLD     metoclopramide  5 mg Oral Q6H PRN    Or     metoclopramide  5 mg Intravenous Q6H PRN     - MEDICATION INSTRUCTIONS -   Does not apply Continuous PRN     - MEDICATION INSTRUCTIONS -   Does not apply Continuous PRN     labetalol  10-20 mg Intravenous Q10 Min PRN     naloxone  0.1-0.4 mg Intravenous Q2 Min PRN     lidocaine  1 mL Other Q1H PRN     lidocaine 4%   Topical Q1H PRN     sodium chloride (PF)  3 mL Intracatheter Q1H PRN     potassium chloride  20-40 mEq Oral Q2H PRN     potassium chloride  20-40 mEq Oral or Feeding Tube Q2H PRN     potassium chloride  10 mEq Intravenous Q1H PRN     potassium chloride with lidocaine  10 mEq Intravenous Q1H PRN     potassium chloride  20 mEq Intravenous Q1H PRN     magnesium sulfate  4 g Intravenous Q4H PRN     acetaminophen  650 mg Rectal Q4H PRN     senna-docusate  1-2 tablet Oral BID PRN     magnesium hydroxide  30 mL Oral Daily PRN     bisacodyl  10 mg Rectal Daily PRN     ondansetron  4 mg Oral Q6H PRN    Or     ondansetron  4 mg Intravenous Q6H PRN     prochlorperazine  5 mg Intravenous Q6H PRN    Or     prochlorperazine  5 mg Oral Q6H PRN    Or     prochlorperazine  12.5 mg Rectal Q12H PRN     - MEDICATION INSTRUCTIONS -   Does not apply Continuous PRN     - MEDICATION INSTRUCTIONS -   Does not apply Continuous PRN     hydrALAZINE  10-20 mg Intravenous Q30 Min PRN            Data:      Lab data reviewed.     Recent Labs  Lab 03/22/17  0515 03/21/17  2215 03/21/17  1327   HGB 12.2  --  13.7   MCV 95  --  96     --  187   INR 0.98  --  0.95   *  --  143   POTASSIUM 3.4  --  3.8   CHLORIDE 113*  --  108   CO2 24  --  28   BUN 16  --  23   CR 0.67  --  0.80   ANIONGAP 9  --  7   RADHA 8.0*  --  8.5   *  --  167*   TROPI 0.031 <0.015  --            Imaging:      Imaging data  reviewed.     Dr. Dipak Lucero D.O.  [facility]  Pager [phone]

## 2017-03-22 NOTE — PLAN OF CARE
Pt very tired overnight and difficult to assess, remained oriented and following commands throughout.  Verbal responses are slow but clear.  Pt is now able to move toes on left foot to command, only reflexive movement noted to left hand.  Blood pressure treated with hydralazine twice for pressures slightly over 180.  MRI planned this morning, will complete checklist.  Family at bedside during the night for support, updated frequently.  Will continue to monitor.

## 2017-03-22 NOTE — PROGRESS NOTES
ICU Multi-Disciplinary Note  Patient condition reviewed and discussed while on multidisciplinary rounds today.   The Critical Care service will continue to follow peripherally while patient is within the ICU. We are readily available should issues arise.  Please feel free to contact us for anything with which we may be of assistance.    Wendy Glass

## 2017-03-22 NOTE — PROGRESS NOTES
"Neurology Progress Note          Assessment and Plan:     CVA (cerebral vascular accident) (H)     More sleepy today and MRI shows some hemorrhagic transformation with midline shift. ECHO pending. MRI confirms large posterior branch MCA infarction. We will continue to monitor in the IVU for increasing ICP. We will get a follow up HCT in the morning as well. Continue blood pressure management. Discussed with family. At this point patient is full code. Discussed with . He will discuss code status with their children and let me know if they change the code status at this point.               Interval History:   Remains lethargic with left hemiparesis, hypesthesia.              Review of Systems:   Review of systems not obtained due to patient factors - critical condition and mental status             Medications:       insulin aspart  1-3 Units Subcutaneous TID AC     insulin aspart  1-3 Units Subcutaneous At Bedtime     sodium chloride (PF)  10 mL Intravenous Once     sodium chloride (PF)  3 mL Intracatheter Q8H     famotidine  20 mg Intravenous BID     glucose **OR** dextrose **OR** glucagon, - MEDICATION INSTRUCTIONS -, HOLD MEDICATION, metoclopramide **OR** metoclopramide, - MEDICATION INSTRUCTIONS -, - MEDICATION INSTRUCTIONS -, labetalol, naloxone, lidocaine, lidocaine 4%, sodium chloride (PF), potassium chloride, potassium chloride, potassium chloride, potassium chloride with lidocaine, potassium chloride, magnesium sulfate, acetaminophen, senna-docusate, magnesium hydroxide, bisacodyl, ondansetron **OR** ondansetron, prochlorperazine **OR** prochlorperazine **OR** prochlorperazine, hydrALAZINE               Physical Exam:   Vitals were reviewed  Blood pressure 153/68, pulse 70, temperature 98.3  F (36.8  C), temperature source Axillary, resp. rate 12, height 1.676 m (5' 6\"), weight 80.1 kg (176 lb 9.4 oz), SpO2 96 %.  Wt Readings from Last 4 Encounters:   03/21/17 80.1 kg (176 lb 9.4 oz) "     Temperatures:  Current - Temp: 98.3  F (36.8  C); Max - Temp  Av.2  F (36.8  C)  Min: 97.5  F (36.4  C)  Max: 98.4  F (36.9  C)  Blood pressure range: Systolic (24hrs), Av , Min:104 , Max:206   ; Diastolic (24hrs), Av, Min:47, Max:117    I/O last 3 completed shifts:  In: 53.75 [I.V.:53.75]  Out: 1300 [Urine:1300]  Neurologic:   Mental Status Exam:   Level of Alertness:   lethargic  Orientation:   Difficult to assess, recognizes family, knows we are at [facility].  Memory:  untestable  Fund of Knowledge:  untestable  Attention/Concentration:  untestable  Language:  untestable  Cranial Nerves: cranial nerves II-XII are difficult to assess, but she has conjugate gaze, left hemianopsia, symmetric reactive pupils.  Motor Exam:  moves right extremities well not the left  Sensory:  Left hypesthesia  Coordination:  untestable  Gait:  unable  Deep Tendon Reflexes:  reflexes are brisker on the left; plantar response:  Right: flexor; left: extensor              Data:   All laboratory and imaging data reviewed by me:  Results for orders placed or performed during the hospital encounter of 17 (from the past 24 hour(s))   Basic metabolic panel   Result Value Ref Range    Sodium 143 133 - 144 mmol/L    Potassium 3.8 3.4 - 5.3 mmol/L    Chloride 108 94 - 109 mmol/L    Carbon Dioxide 28 20 - 32 mmol/L    Anion Gap 7 3 - 14 mmol/L    Glucose 167 (H) 70 - 99 mg/dL    Urea Nitrogen 23 7 - 30 mg/dL    Creatinine 0.80 0.52 - 1.04 mg/dL    GFR Estimate 70 >60 mL/min/1.7m2    GFR Estimate If Black 84 >60 mL/min/1.7m2    Calcium 8.5 8.5 - 10.1 mg/dL   CBC with platelets differential   Result Value Ref Range    WBC 4.5 4.0 - 11.0 10e9/L    RBC Count 4.22 3.8 - 5.2 10e12/L    Hemoglobin 13.7 11.7 - 15.7 g/dL    Hematocrit 40.4 35.0 - 47.0 %    MCV 96 78 - 100 fl    MCH 32.5 26.5 - 33.0 pg    MCHC 33.9 31.5 - 36.5 g/dL    RDW 13.5 10.0 - 15.0 %    Platelet Count 187 150 - 450 10e9/L    Diff Method Automated Method     %  Neutrophils 51.0 %    % Lymphocytes 38.5 %    % Monocytes 7.6 %    % Eosinophils 2.0 %    % Basophils 0.7 %    % Immature Granulocytes 0.2 %    Nucleated RBCs 0 0 /100    Absolute Neutrophil 2.3 1.6 - 8.3 10e9/L    Absolute Lymphocytes 1.7 0.8 - 5.3 10e9/L    Absolute Monocytes 0.3 0.0 - 1.3 10e9/L    Absolute Eosinophils 0.1 0.0 - 0.7 10e9/L    Absolute Basophils 0.0 0.0 - 0.2 10e9/L    Abs Immature Granulocytes 0.0 0 - 0.4 10e9/L    Absolute Nucleated RBC 0.0    INR   Result Value Ref Range    INR 0.95 0.86 - 1.14   Partial thromboplastin time   Result Value Ref Range    PTT 29 22 - 37 sec   Lipid panel reflex to direct LDL   Result Value Ref Range    Cholesterol 207 (H) <200 mg/dL    Triglycerides 130 <150 mg/dL    HDL Cholesterol 73 >49 mg/dL    LDL Cholesterol Calculated 108 (H) <100 mg/dL    Non HDL Cholesterol 134 (H) <130 mg/dL   CRP inflammation   Result Value Ref Range    CRP Inflammation <2.9 0.0 - 8.0 mg/L   Hemoglobin A1c   Result Value Ref Range    Hemoglobin A1C 5.9 4.3 - 6.0 %   TSH   Result Value Ref Range    TSH 1.89 0.40 - 4.00 mU/L   Vitamin B12   Result Value Ref Range    Vitamin B12 323 193 - 986 pg/mL   Vitamin D Deficiency   Result Value Ref Range    Vitamin D Deficiency screening 16 (L) 20 - 75 ug/L   Erythrocyte sedimentation rate auto   Result Value Ref Range    Sed Rate 8 0 - 30 mm/h   CT Head w/o Contrast    Narrative    CT SCAN OF THE HEAD WITHOUT CONTRAST   3/21/2017 1:45 PM     HISTORY: Stroke.    TECHNIQUE: Axial images of the head and coronal reformations without  IV contrast material. Radiation dose for this scan was reduced using  automated exposure control, adjustment of the mA and/or kV according  to patient size, or iterative reconstruction technique.    COMPARISON: None.    FINDINGS: There is some borderline mild cerebral atrophy. Minimal  nonspecific white matter changes are present in both hemispheres  without mass effect. There is no evidence for intracranial  hemorrhage,  mass effect, acute infarct, or a skull fracture.      Impression    IMPRESSION: Minimal chronic changes. No evidence for intracranial  hemorrhage or any acute process.      DAVONTE REYES MD   CTA Angiogram Head Neck    Narrative    CTA ANGIOGRAM HEAD AND NECK 3/21/2017 1:56 PM     HISTORY: Stroke    TECHNIQUE: Axial images were obtained through the head and neck  without and with intravenous contrast. 70 mL Isovue-370 was given.  Multiplanar reconstructions were performed. 3-D reconstructions off a  remote workstation for CT angiography were also acquired. Carotid  stenoses were evaluated by comparing the caliber of the proximal  internal carotid artery to the caliber of the distal internal carotid  artery. Radiation dose for this scan was reduced using automated  exposure control, adjustment of the mA and/or kV according to patient  size, or iterative reconstruction technique.    COMPARISON: None.    FINDINGS:    Brachiocephalic vessels: There is some mild calcific plaque along the  thoracic arch. Proximal brachiocephalic vessels are patent. There is a  small left vertebral artery which has direct origin off the arch.    Right carotid system: Normal.    Left carotid system: Normal.    Right vertebral artery: Normal.    Left vertebral artery: Normal.    [place]: There is abnormal short segment filling defect or  high-grade stenosis in the distal right M1 proximal M2 segment with  poor filling of an M2 branch. The left middle cerebral artery and  branches are patent. The distal internal carotid arteries and proximal  anterior cerebral arteries are patent. The basilar artery and proximal  posterior cerebral arteries are patent. There is no evidence for  aneurysm.      Impression    IMPRESSION: Filling defect versus short segment high-grade stenosis  involving the distal right M1 and proximal M2 branch with poor filling  of a right M2 branch. Finding is probably due to a thromboembolus.    DAVONTE  MD ERIC   CT Head w Contrast    Narrative    CT HEAD WITH CONTRAST 3/21/2017 2:02 PM     HISTORY: Stroke.    TECHNIQUE: Axial images were obtained through the brain with  intravenous contrast. 50 mL of Isovue-370 was given. Multiplanar  reconstructions were performed for CT perfusion imaging. Radiation  dose for this scan was reduced using automated exposure control,  adjustment of the mA and/or kV according to patient size, or iterative  reconstruction technique..    FINDINGS: Cerebral blood flow, cerebral blood volume, and mean transit  time maps show a moderate to large matched perfusion defect in the  posterior division of the right middle cerebral artery territory  consistent with an infarct. Remainder of perfusion appears normal.      Impression    IMPRESSION: Moderate to large right posterior division middle cerebral  artery territory matched defect consistent with infarct.    DAVONTE REYES MD   IR Carotid Cerebral Angiogram Right    Narrative    CAROTID AND CEREBRAL ANGIOGRAM WITH 3-D ROTATIONAL ANGIOGRAPHY  3/21/2017 3:55 PM    CLINICAL INFORMATION: 76-year-old woman who collapsed while at a  bridal shop. She was found have significant left-sided weakness, and  taken to the ER, where CTA reveals occlusion of the M2 origin of the  posterior division. She was given a full dose of IV TPA, but cerebral  angiogram with possible mechanical thrombectomy requested.     OPERATIONS:  1. Right common carotid angiograms centered over the carotid  vasculature in the neck  2. Right common carotid angiograms centered over the cerebral  vasculature    PROCEDURE:    The nature of the procedure was discussed in detail the patient and  her , including potential risks and benefits. All of their  questions were answered written and verbal informed consent obtained  from the patient has been. The patient was then brought to the  neuroangiography suite and placed supine on the table. Both groins  were prepped and draped in  sterile fashion. In addition, intravenous  moderate conscious sedation with appropriate preoperative,  intraoperative, and postoperative physician and nurse monitoring was  also started at that point, and continued for a total of 30 minutes.  The patient's vital signs remained stable throughout the procedure.    Under local anesthesia with lidocaine, and using sterile technique,  the right common femoral artery was directly percutaneously accessed  with a micropuncture set and a 5 Brazilian sheath placed. A 5 Brazilian  Berenstein diagnostic catheter was advanced into the aortic arch and  placed selectively into the right common carotid artery, right  internal carotid artery. Numerous diagnostic biplane angiograms  centered over the carotid vasculature in the neck and the cerebral  vasculature in the head were obtained in various projections. There  was noted to be an M2 occlusion of the posterior division of the right  middle cerebral artery, with patency of the anterior division and M1  segment, but TICI 0 flow into the posterior division. Therefore, a  decision was made to attempt mechanical thrombectomy. 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 The Berenstein catheter was unable  to be advanced around this loop despite several attempts. The  indwelling right common femoral 5 Brazilian sheath was exchanged over a  Winters wire for a 90 cm neuron Max sheath which was advanced triaxially  over the Winters wire and a 1 25 cm Berenstein catheter. The neuron Max  catheter was then advanced into the right common carotid, and was able  to be advanced to the proximal side of the loop, but was completely  hubbed at that point and unable to be advanced more distally.  Therefore, given the proximal nature of the guide catheter and the  length and needed to reach the M2 segment, it did not appear that  thrombectomy would be possible. The procedure was  therefore  terminated. The neuron Max catheter was withdrawn over the neuron Max  wire and exchanged for a short 7 [place]ian sheath which was sutured into  place. A scratch The patient appeared to tolerate procedure well with  no evidence of a complication.    Total sedation time: 30 minutes    Medications:  Versed 3.5 mg IV  Fentanyl 175 mcg IV  Lidocaine: 10 cc 1% lidocaine subcutaneously    Total fluoroscopy time: 9.0 minutes  Air Kerma: 639 mGy    EBL: Minimal    Total contrast: 20 mL Isovue-320    FINDINGS:    As noted above, there is complete occlusion of the posterior division  of the right middle cerebral artery, with occlusion at the M2 origin  of the posterior division and TICI 0 flow. The anterior division is  widely patent, as is the M1 segment. Widely patent right anterior  cerebral artery. Extracranially, minor plaque in the right carotid  bulb, but no significant right internal carotid origin stenosis by  NASCET criteria. There is significant tortuosity of the aortic arch,  with patency of the right common carotid, but a severe, complete 360  degree loop in the proximal right common carotid. As noted above, the  neuron Max guide catheter was only able to be advanced to the proximal  aspect of the proximal common carotid 360 degree loop when completely  hubbed at the skin. Therefore, mechanical thrombectomy was not  possible given lack of catheter length, and was not performed.      Impression    IMPRESSION:    1.  Complete occlusion of the posterior division of the right middle  cerebral artery with clot at the M2 origin. The anterior division is  widely patent, as is the M1 segment.  2.  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 As such, the  longest available neuron Max guide catheter was only able to be  advanced to the proximal aspect of the 360 degree loop when completely  hubbed at the skin. Mechanical thrombectomy was,  therefore, not  possible, and was not performed.  3.  Minor plaque in the right carotid bulb, but no significant right  internal carotid origin stenosis by NASA criteria.   4.  Findings discussed with Dr. Murdock.    EVALUATION AND STENOSIS DETERMINATION MADE USING NASCET CRITERIA.    KALEY CAMERON MD   Glucose by meter   Result Value Ref Range    Glucose 120 (H) 70 - 99 mg/dL   EKG 12-lead, tracing only   Result Value Ref Range    Interpretation ECG Click View Image link to view waveform and result    Glucose by meter   Result Value Ref Range    Glucose 157 (H) 70 - 99 mg/dL   Troponin I   Result Value Ref Range    Troponin I ES <0.015 0.000 - 0.045 ug/L   CBC with platelets   Result Value Ref Range    WBC 7.9 4.0 - 11.0 10e9/L    RBC Count 3.85 3.8 - 5.2 10e12/L    Hemoglobin 12.2 11.7 - 15.7 g/dL    Hematocrit 36.4 35.0 - 47.0 %    MCV 95 78 - 100 fl    MCH 31.7 26.5 - 33.0 pg    MCHC 33.5 31.5 - 36.5 g/dL    RDW 13.7 10.0 - 15.0 %    Platelet Count 182 150 - 450 10e9/L   Comprehensive metabolic panel   Result Value Ref Range    Sodium 146 (H) 133 - 144 mmol/L    Potassium 3.4 3.4 - 5.3 mmol/L    Chloride 113 (H) 94 - 109 mmol/L    Carbon Dioxide 24 20 - 32 mmol/L    Anion Gap 9 3 - 14 mmol/L    Glucose 174 (H) 70 - 99 mg/dL    Urea Nitrogen 16 7 - 30 mg/dL    Creatinine 0.67 0.52 - 1.04 mg/dL    GFR Estimate 86 >60 mL/min/1.7m2    GFR Estimate If Black >90   GFR Calc   >60 mL/min/1.7m2    Calcium 8.0 (L) 8.5 - 10.1 mg/dL    Bilirubin Total 0.4 0.2 - 1.3 mg/dL    Albumin 3.0 (L) 3.4 - 5.0 g/dL    Protein Total 5.9 (L) 6.8 - 8.8 g/dL    Alkaline Phosphatase 67 40 - 150 U/L    ALT 16 0 - 50 U/L    AST 22 0 - 45 U/L   Hemoglobin A1c   Result Value Ref Range    Hemoglobin A1C 5.7 4.3 - 6.0 %   INR   Result Value Ref Range    INR 0.98 0.86 - 1.14   Troponin I   Result Value Ref Range    Troponin I ES 0.031 0.000 - 0.045 ug/L   Glucose by meter   Result Value Ref Range    Glucose 168 (H) 70 - 99  mg/dL   IR Discontinue Sheath    Narrative    This exam was marked as non-reportable because it will not be read by a   radiologist or a [place] non-radiologist provider.

## 2017-03-22 NOTE — PLAN OF CARE
Problem: Goal Outcome Summary  Goal: Goal Outcome Summary  PT/OT: Per MD notes, pt did receive IV tPA at 4:30pm on 3/21, per protocol hold OOB mobility for 24hrs post delivery of tPA.

## 2017-03-23 ENCOUNTER — APPOINTMENT (OUTPATIENT)
Dept: SPEECH THERAPY | Facility: CLINIC | Age: 77
DRG: 061 | End: 2017-03-23
Attending: INTERNAL MEDICINE
Payer: MEDICARE

## 2017-03-23 ENCOUNTER — APPOINTMENT (OUTPATIENT)
Dept: CT IMAGING | Facility: CLINIC | Age: 77
DRG: 061 | End: 2017-03-23
Attending: PSYCHIATRY & NEUROLOGY
Payer: MEDICARE

## 2017-03-23 LAB
ANION GAP SERPL CALCULATED.3IONS-SCNC: 8 MMOL/L (ref 3–14)
BUN SERPL-MCNC: 22 MG/DL (ref 7–30)
CALCIUM SERPL-MCNC: 7.9 MG/DL (ref 8.5–10.1)
CHLORIDE SERPL-SCNC: 116 MMOL/L (ref 94–109)
CO2 SERPL-SCNC: 24 MMOL/L (ref 20–32)
CREAT SERPL-MCNC: 0.7 MG/DL (ref 0.52–1.04)
ERYTHROCYTE [DISTWIDTH] IN BLOOD BY AUTOMATED COUNT: 14.4 % (ref 10–15)
GFR SERPL CREATININE-BSD FRML MDRD: 81 ML/MIN/1.7M2
GLUCOSE BLDC GLUCOMTR-MCNC: 111 MG/DL (ref 70–99)
GLUCOSE BLDC GLUCOMTR-MCNC: 132 MG/DL (ref 70–99)
GLUCOSE BLDC GLUCOMTR-MCNC: 99 MG/DL (ref 70–99)
GLUCOSE SERPL-MCNC: 125 MG/DL (ref 70–99)
HCT VFR BLD AUTO: 35.7 % (ref 35–47)
HGB BLD-MCNC: 11.9 G/DL (ref 11.7–15.7)
INTERPRETATION ECG - MUSE: NORMAL
MAGNESIUM SERPL-MCNC: 2.3 MG/DL (ref 1.6–2.3)
MCH RBC QN AUTO: 31.9 PG (ref 26.5–33)
MCHC RBC AUTO-ENTMCNC: 33.3 G/DL (ref 31.5–36.5)
MCV RBC AUTO: 96 FL (ref 78–100)
OSMOLALITY SERPL: 306 MMOL/KG (ref 280–301)
OSMOLALITY SERPL: 307 MMOL/KG (ref 280–301)
OSMOLALITY SERPL: 308 MMOL/KG (ref 280–301)
PLATELET # BLD AUTO: 121 10E9/L (ref 150–450)
POTASSIUM SERPL-SCNC: 3.2 MMOL/L (ref 3.4–5.3)
POTASSIUM SERPL-SCNC: 3.4 MMOL/L (ref 3.4–5.3)
RBC # BLD AUTO: 3.73 10E12/L (ref 3.8–5.2)
SODIUM SERPL-SCNC: 148 MMOL/L (ref 133–144)
TROPONIN I SERPL-MCNC: 0.06 UG/L (ref 0–0.04)
WBC # BLD AUTO: 8 10E9/L (ref 4–11)

## 2017-03-23 PROCEDURE — 40000225 ZZH STATISTIC SLP WARD VISIT: Performed by: SPEECH-LANGUAGE PATHOLOGIST

## 2017-03-23 PROCEDURE — 92610 EVALUATE SWALLOWING FUNCTION: CPT | Mod: GN | Performed by: SPEECH-LANGUAGE PATHOLOGIST

## 2017-03-23 PROCEDURE — 25000128 H RX IP 250 OP 636: Performed by: PSYCHIATRY & NEUROLOGY

## 2017-03-23 PROCEDURE — 70450 CT HEAD/BRAIN W/O DYE: CPT

## 2017-03-23 PROCEDURE — 84132 ASSAY OF SERUM POTASSIUM: CPT | Performed by: PSYCHIATRY & NEUROLOGY

## 2017-03-23 PROCEDURE — 84484 ASSAY OF TROPONIN QUANT: CPT | Performed by: PSYCHIATRY & NEUROLOGY

## 2017-03-23 PROCEDURE — 83735 ASSAY OF MAGNESIUM: CPT | Performed by: PSYCHIATRY & NEUROLOGY

## 2017-03-23 PROCEDURE — 36569 INSJ PICC 5 YR+ W/O IMAGING: CPT

## 2017-03-23 PROCEDURE — 99233 SBSQ HOSP IP/OBS HIGH 50: CPT | Performed by: HOSPITALIST

## 2017-03-23 PROCEDURE — 36415 COLL VENOUS BLD VENIPUNCTURE: CPT | Performed by: HOSPITALIST

## 2017-03-23 PROCEDURE — 83930 ASSAY OF BLOOD OSMOLALITY: CPT | Performed by: PSYCHIATRY & NEUROLOGY

## 2017-03-23 PROCEDURE — 00000146 ZZHCL STATISTIC GLUCOSE BY METER IP

## 2017-03-23 PROCEDURE — 25000125 ZZHC RX 250: Performed by: PSYCHIATRY & NEUROLOGY

## 2017-03-23 PROCEDURE — 85027 COMPLETE CBC AUTOMATED: CPT | Performed by: HOSPITALIST

## 2017-03-23 PROCEDURE — 40000257 ZZH STATISTIC CONSULT NO CHARGE VASC ACCESS

## 2017-03-23 PROCEDURE — 20000003 ZZH R&B ICU

## 2017-03-23 PROCEDURE — 25000125 ZZHC RX 250: Performed by: INTERNAL MEDICINE

## 2017-03-23 PROCEDURE — 80048 BASIC METABOLIC PNL TOTAL CA: CPT | Performed by: HOSPITALIST

## 2017-03-23 PROCEDURE — 27210219 ZZH KIT SHRLOCK 5FR DBL LUM PWR P

## 2017-03-23 PROCEDURE — 83930 ASSAY OF BLOOD OSMOLALITY: CPT | Performed by: HOSPITALIST

## 2017-03-23 PROCEDURE — 40000239 ZZH STATISTIC VAT ROUNDS

## 2017-03-23 PROCEDURE — S0028 INJECTION, FAMOTIDINE, 20 MG: HCPCS | Performed by: INTERNAL MEDICINE

## 2017-03-23 RX ORDER — UBIDECARENONE 100 MG
100 CAPSULE ORAL DAILY
Status: DISCONTINUED | OUTPATIENT
Start: 2017-03-23 | End: 2017-03-31 | Stop reason: HOSPADM

## 2017-03-23 RX ORDER — MANNITOL 20 G/100ML
50 INJECTION, SOLUTION INTRAVENOUS EVERY 6 HOURS
Status: DISCONTINUED | OUTPATIENT
Start: 2017-03-23 | End: 2017-03-25

## 2017-03-23 RX ORDER — MANNITOL 20 G/100ML
50 INJECTION, SOLUTION INTRAVENOUS EVERY 6 HOURS
Status: DISCONTINUED | OUTPATIENT
Start: 2017-03-23 | End: 2017-03-23

## 2017-03-23 RX ORDER — LIDOCAINE 40 MG/G
CREAM TOPICAL
Status: DISCONTINUED | OUTPATIENT
Start: 2017-03-23 | End: 2017-03-31 | Stop reason: HOSPADM

## 2017-03-23 RX ORDER — HEPARIN SODIUM,PORCINE 10 UNIT/ML
2-5 VIAL (ML) INTRAVENOUS
Status: DISCONTINUED | OUTPATIENT
Start: 2017-03-23 | End: 2017-03-31 | Stop reason: HOSPADM

## 2017-03-23 RX ADMIN — MANNITOL 50 G: 20 INJECTION, SOLUTION INTRAVENOUS at 16:26

## 2017-03-23 RX ADMIN — POTASSIUM CHLORIDE 20 MEQ: 29.8 INJECTION, SOLUTION INTRAVENOUS at 17:24

## 2017-03-23 RX ADMIN — HYDRALAZINE HYDROCHLORIDE 20 MG: 20 INJECTION INTRAMUSCULAR; INTRAVENOUS at 03:56

## 2017-03-23 RX ADMIN — MANNITOL 50 G: 20 INJECTION, SOLUTION INTRAVENOUS at 10:27

## 2017-03-23 RX ADMIN — Medication 2.5 MG/HR: at 07:28

## 2017-03-23 RX ADMIN — LIDOCAINE HYDROCHLORIDE 2 ML: 10 INJECTION, SOLUTION EPIDURAL; INFILTRATION; INTRACAUDAL; PERINEURAL at 09:10

## 2017-03-23 RX ADMIN — POTASSIUM CHLORIDE 20 MEQ: 29.8 INJECTION, SOLUTION INTRAVENOUS at 18:30

## 2017-03-23 RX ADMIN — MANNITOL 50 G: 20 INJECTION, SOLUTION INTRAVENOUS at 22:14

## 2017-03-23 RX ADMIN — FAMOTIDINE 20 MG: 10 INJECTION, SOLUTION INTRAVENOUS at 22:14

## 2017-03-23 RX ADMIN — FAMOTIDINE 20 MG: 10 INJECTION, SOLUTION INTRAVENOUS at 09:30

## 2017-03-23 RX ADMIN — Medication 5 MG/HR: at 19:26

## 2017-03-23 NOTE — PLAN OF CARE
Problem: Goal Outcome Summary  Goal: Goal Outcome Summary  Outcome: No Change  Pt has remained stable. Neuro's unchanged but pt seems a little more alert after mannitol started - diuresing well. Nicardipine started this am to keep SBP<140.

## 2017-03-23 NOTE — PLAN OF CARE
Pt resting comfortably between neuro checks overnight.  Pt denies headache at this time.  Blood pressure maintained to within defined parameters with PRN hydralazine x2 overnight.  Family at bedside and updated throughout night.  HCT done this AM.  Urine slowing this AM.  WIll continue to monitor.

## 2017-03-23 NOTE — PROGRESS NOTES
"[facility]  Hospitalist Progress Note        Dipak Lina Nic, DO  2017        Interval History:      Patient continues to be somnolent, discussed with family at bedside.          Assessment and Plan:        Marcia Kelley is a 76 year old female with no known significant medical hx except for possible HTN and is on a baby aspirin daily who presents with sudden onset left sided weakness and collapse.       Acute right MCA territory stroke Associated with sudden onset left sided weakness, facial droop, slurred speech, confusion and collapsed. CTA as below. S/p iv tPA and and IR thrombectomy was attempted but was unsuccessful due to the anatomy of her prox R CCA with tortuosity and a significant 360 degree loop.   - S/p iv tPA.   - Appreciate Neurology consultation.   - IVF.   - ASA/AC per Neurology discretion.   - Keep SBP < 180 and DBP < 105. IV prn's and iv Nicardipine available if needed.   - Therapies.   - Mannitol initiated.        Vitamin D deficiency: Returned low on 3/22.   - Vitamin D supplementation.      Possible HTN She denies any medical hx but her  reports a longstanding hx of HTN. She does not go to doctors as she thinks they are \"of no use\" according to her . BP mildly elevated her in the ER.   - Will monitor and treat as appropriate based on the tPA protocol for now.       DVT Prophylaxis: Pneumatic Compression Devices     Code: Full Code      Disposition: Pending clinical course.                    Physical Exam:      Heart Rate: 76    Blood pressure 132/65, pulse 70, temperature 98.5  F (36.9  C), temperature source Axillary, resp. rate 16, height 1.676 m (5' 6\"), weight 80.1 kg (176 lb 9.4 oz), SpO2 94 %.    Vitals:    17 1300 17 1649   Weight: 81.6 kg (179 lb 14.3 oz) 80.1 kg (176 lb 9.4 oz)       Vital Sign Ranges  Temperature Temp  Av.6  F (37  C)  Min: 98.2  F (36.8  C)  Max: 99  F (37.2  C)   Blood pressure Systolic (24hrs), Av , " Min:106 , Max:157        Diastolic (24hrs), Av, Min:56, Max:112      Pulse No Data Recorded   Respirations Resp  Av.7  Min: 9  Max: 35   Pulse oximetry SpO2  Av %  Min: 90 %  Max: 96 %     Vital Signs with Ranges  Temp:  [98.2  F (36.8  C)-99  F (37.2  C)] 98.5  F (36.9  C)  Heart Rate:  [50-99] 76  Resp:  [9-35] 16  BP: (106-157)/() 132/65  SpO2:  [90 %-96 %] 94 %    I/O Last 3 Shifts:   I/O last 3 completed shifts:  In: 2550 [I.V.:2550]  Out: 1050 [Urine:1050]    I/O past 24 hours:     Intake/Output Summary (Last 24 hours) at 17 0846  Last data filed at 17 0800   Gross per 24 hour   Intake          1806.67 ml   Output              920 ml   Net           886.67 ml     GENERAL: NAD. Somnolent. Continues to be lethargic.   HEENT: Normocephalic. Nares normal.   LUNGS: Clear to auscultation. No dyspnea at rest.   HEART: Regular rate. Extremities perfused.   ABDOMEN: Soft, nontender, and nondistended. Positive bowel sounds.   EXTREMITIES: No LE edema noted.   NEUROLOGIC: Weakness. Unable to follow commands.          Prior to Admission Medications:        Prescriptions Prior to Admission   Medication Sig Dispense Refill Last Dose     ASPIRIN PO Take 81 mg by mouth At Bedtime    3/20/2017 at             Medications:        Current Facility-Administered Medications   Medication Last Rate     - MEDICATION INSTRUCTIONS -       NaCl 75 mL/hr at 17 0800     - MEDICATION INSTRUCTIONS -       - MEDICATION INSTRUCTIONS -       niCARdipine 40 mg in 200 mL 0.9% NaCl 2.5 mg/hr (17 0728)     Current Facility-Administered Medications   Medication Dose Route Frequency     mannitol  50 g Intravenous Q6H     sodium chloride (PF)  10 mL Intracatheter Q8H     cholecalciferol  2,000 Units Oral Daily     co-enzyme Q-10  100 mg Oral Daily     insulin aspart  1-3 Units Subcutaneous TID AC     insulin aspart  1-3 Units Subcutaneous At Bedtime     sodium chloride (PF)  10 mL Intravenous Once      sodium chloride (PF)  3 mL Intracatheter Q8H     famotidine  20 mg Intravenous BID     Current Facility-Administered Medications   Medication Dose Route Frequency     lidocaine  0.5-5 mL Other Once PRN     lidocaine 4%   Topical Once PRN     sodium chloride (PF)  5-50 mL Intracatheter Once PRN     heparin lock flush  2-5 mL Intracatheter Once PRN     sodium chloride (PF)  10-20 mL Intracatheter Q1H PRN     glucose  15-30 g Oral Q15 Min PRN    Or     dextrose  25-50 mL Intravenous Q15 Min PRN    Or     glucagon  1 mg Subcutaneous Q15 Min PRN     - MEDICATION INSTRUCTIONS -   Does not apply Continuous PRN     HOLD MEDICATION   Does not apply HOLD     metoclopramide  5 mg Oral Q6H PRN    Or     metoclopramide  5 mg Intravenous Q6H PRN     - MEDICATION INSTRUCTIONS -   Does not apply Continuous PRN     - MEDICATION INSTRUCTIONS -   Does not apply Continuous PRN     labetalol  10-20 mg Intravenous Q10 Min PRN     naloxone  0.1-0.4 mg Intravenous Q2 Min PRN     lidocaine  1 mL Other Q1H PRN     lidocaine 4%   Topical Q1H PRN     sodium chloride (PF)  3 mL Intracatheter Q1H PRN     potassium chloride  20-40 mEq Oral Q2H PRN     potassium chloride  20-40 mEq Oral or Feeding Tube Q2H PRN     potassium chloride  10 mEq Intravenous Q1H PRN     potassium chloride with lidocaine  10 mEq Intravenous Q1H PRN     potassium chloride  20 mEq Intravenous Q1H PRN     magnesium sulfate  4 g Intravenous Q4H PRN     acetaminophen  650 mg Rectal Q4H PRN     senna-docusate  1-2 tablet Oral BID PRN     magnesium hydroxide  30 mL Oral Daily PRN     bisacodyl  10 mg Rectal Daily PRN     ondansetron  4 mg Oral Q6H PRN    Or     ondansetron  4 mg Intravenous Q6H PRN     prochlorperazine  5 mg Intravenous Q6H PRN    Or     prochlorperazine  5 mg Oral Q6H PRN    Or     prochlorperazine  12.5 mg Rectal Q12H PRN     hydrALAZINE  10-20 mg Intravenous Q30 Min PRN            Data:      Lab data reviewed.     Recent Labs  Lab 03/23/17  [id number]  03/22/17  0515 03/21/17  2215 03/21/17  1327   HGB 11.9 12.2  --  13.7   MCV 96 95  --  96   * 182  --  187   INR  --  0.98  --  0.95   * 146*  --  143   POTASSIUM 3.4 3.4  --  3.8   CHLORIDE 116* 113*  --  108   CO2 24 24  --  28   BUN 22 16  --  23   CR 0.70 0.67  --  0.80   ANIONGAP 8 9  --  7   RADHA 7.9* 8.0*  --  8.5   * 174*  --  167*   TROPI  --  0.031 <0.015  --            Imaging:      Imaging data reviewed.     NITHYA GuerraO.  [facility] Healthcare[facility]  Pager [phone]

## 2017-03-23 NOTE — PLAN OF CARE
Problem: Goal Outcome Summary  Goal: Goal Outcome Summary  SLP:  Pt seen for a bedside swallow evaluation due to admission for (R) MCA CVA.  Pt noted to continue to have reduced RAN. Pt able to arouse w/ prompts, respond to simple questions and follow simple commands. Pt presented with moderate oral and pharyngeal dysphagia w/ reduced RAN a significant factor. Pt with reduced oral motor mvmts and (L) sided facial weakness,  delayed and weak swallow response with 2 ice chips given. Pt noted to fatigue during eval and needed max cues to maintain RAN to swallow 2nd ice chip trial.  Though pt did not demonstrate outward s/sx of aspiration during eval, pt is at significant risk with poor RAN.  Recommend pt continue to be NPO today. May have small ice chips by RN only if becomes more alert. ST will continue to follow for PO readiness.  Family educated on results and recommendations.  Good understanding noted.

## 2017-03-23 NOTE — PLAN OF CARE
Problem: Goal Outcome Summary  Goal: Goal Outcome Summary  Outcome: No Change  Pt had MRI this am and was found to have bleed. Neuro's unchanged. SBP goal < 140 now - nicardipine ordered. Pt had nausea this evening- zofran given - neuro called - waiting for call back. Will continue to check neuro's every hour

## 2017-03-23 NOTE — PROGRESS NOTES
03/23/17 1023   General Information   Onset Date 03/21/17   Start of Care Date 03/23/17   Referring Physician Dr. Moore   Patient Profile Review/OT: Additional Occupational Profile Info See Profile for full history and prior level of function   Patient/Family Goals Statement Pt did not state a goal   Swallowing Evaluation Bedside swallow evaluation   Behaviorial Observations Lethargic  (limited RAN)   Mode of current nutrition NPO   Respiratory Status O2 Supply   Type of O2 supply Nasal cannula   Comments Pt is a 76 year old female with no known significant medical hx except for possible HTN and is on a baby aspirin daily who presents with sudden onset left sided weakness and collapse. Pt found to have large (R) MCA CVA w/ ongoing cerebral swelling.  Pt with limited RAN today.  Able to wake w/ stim and respond to simple questions and followed simple commands.  Multiple family members present for eval.    Clinical Swallow Evaluation   Oral Musculature ((L) sided weakness)   Structural Abnormalities none present   Dentition present and adequate   Mucosal Quality sticky  (thick sticky secretions noted orally)   Oral Labial Strength and Mobility impaired pursing;impaired seal;impaired coordination;impaired retraction  ((L) sided weakness)   Lingual Strength and Mobility impaired anterior elevation;impaired left lateral movement;impaired right lateral movement;impaired protrusion   Buccal Strength and Mobility ((L) sided weakness)   Laryngeal Function Voicing initiated   Clinical Swallow Eval: Thin Liquid Texture Trial   Mode of Presentation, Thin Liquids spoon   Volume of Liquid or Food Presented 2 small ice chips   Oral Phase of Swallow Residue in oral cavity;Poor AP movement   Pharyngeal Phase of Swallow impaired;reduction in laryngeal movement;other (see comments)  (delayed swallow response)   Diagnostic Statement Pt given 2 small ice chips w/ variable oral awareness. Pt able to elicit eventual swallow after  prolonged oral manipulation with initial ice chip; however, w/ second trial, pt noted need max cues to continue awareness of ice chip and swallow. Though no outward s/sx of aspiration noted. Pt at high risk due to poor RAN.  No further PO given due to risks for aspiration   Esophageal Phase of Swallow   Esophageal comments family denies h/o esophageal dysphagia   General Therapy Interventions   Planned Therapy Interventions Dysphagia Treatment   Dysphagia treatment Oropharyngeal exercise training;Modified diet education;Instruction of safe swallow strategies;Compensatory strategies for swallowing   Swallow Eval: Clinical Impressions   Skilled Criteria for Therapy Intervention Skilled criteria met.  Treatment indicated.   Functional Assessment Scale (FAS) 3   Treatment Diagnosis Moderate oral and pharyngeal dysphagia w/ poor RAN a significant factor w/ PO safety   Diet texture recommendations NPO  (may have small ice chips w/ RN only if alert)   Demonstrates Need for Referral to Another Service occupational therapy;physical therapy;dietitian   Therapy Frequency daily   Predicted Duration of Therapy Intervention (days/wks) 2 weeks   Anticipated Discharge Disposition inpatient rehabilitation facility   Risks and Benefits of Treatment have been explained. Yes   Patient, family and/or staff in agreement with Plan of Care Yes   Clinical Impression Comments SLP: Pt seen for a bedside swallow evaluation due to admission for (R) MCA CVA. Pt noted to continue to have reduced RAN. Pt able to arouse w/ prompts, respond to simple questions and follow simple commands. Pt presented with moderate oral and pharyngeal dysphagia w/ reduced RAN a significant factor. Pt with reduced oral motor mvmts and (L) sided facial weakness, delayed and weak swallow response with 2 ice chips given. Pt noted to fatigue during eval and needed max cues to maintain RAN to swallow 2nd ice chip trial. Though pt did not demonstrate outward s/sx of  aspiration during eval, pt is at significant risk with poor RAN. Recommend pt continue to be NPO today. May have small ice chips by RN only if becomes more alert. ST will continue to follow for PO readiness. Family educated on results and recommendations. Good understanding noted.    Total Evaluation Time   Total Evaluation Time (Minutes) 30

## 2017-03-23 NOTE — PLAN OF CARE
Problem: Goal Outcome Summary  Goal: Goal Outcome Summary  OT and PT: Spoke with Dr. العراقي-Pt not appropriate for therapies today, on bedrest.

## 2017-03-23 NOTE — CONSULTS
[facility] [place]  [facility]    NeuroCritical Care Consultation Note     Marcia Kelley MRN# [id number]   YOB: 1940 Age: 76 year old    Code Status:Full Code   Date of Admission: 3/21/2017  Date of Consult: 03/23/2017    _________________________________   Primary Care Physician   None  ______________________________________________         Assessment & Plan   ______________________________________________  (I63.411) Cerebral infarction due to embolism of right middle cerebral artery (H)  (primary encounter diagnosis)  --Stroke consisted with embolic event  --Likely undiagnosed atrial fibrillation  --Continue close monitoring for atrial fibrillation  --Hemorrhagic conversion will prevent anticoagulation at this time.   ---Will hold antiplatelets for 7 days  (G93.6) Cerebral edema (H)  --Related to large R MCA stroke  --Increased on CT from 3/23/17  --Start Mannitol 50g Q6H  (I51.7) Atrial dilatation, left  --Likely the sign of atrial fibrillation  (I10) Benign essential hypertension  --Keep BP <180 mm Hg  --Management per hospitalists.   (E55.9) Vitamin D deficiency  --Start vitamin D 2000U and CoQ10  (E78.2) Mixed hyperlipidemia  --  --Not contributory to this stroke  --Will not start statins at this time.   #. DVT Prophylaxis  --Mechanical only due to hemorrhagic conversion  #. PT/OT/Speech  --Start evaluations 3/24/17  #. Nutrition / GI Prophylaxis  --Per recommendations of speech therapy    #. Code Status: Full Code    Long discussion with family.      TIME:   Neurocritical care time:  50 minutes for evaluation and management of large stroke and cerebral edema. Patient is critically ill and has a very high risk of deterioration  ----------------------------------------------------------------------------------  ----------------------------------------------------------------------------------  Reason for consult: I was asked by Dr. Murdock to evaluate  this patient for cerebral edema.    Chief Complaint   ______________________________________________  Left sided weakness  History is obtained from the patient    History of Present Illness   ______________________________________________  76 year old female with no known significant medical hx except for HTN and is on a baby aspirin daily who presents with sudden onset left sided weakness and collapse. The patient was out shopping at [employer] for an upcoming wedding and was in the dressing room when she experienced left sided weakness, facial droop, slurred speech, confusion and collapsed to the ground. Staff heard her fall and when she was found on the ground 911 was called. No seizure like activity was reported.    On EMS evaluation she was responsive but confused and had left arm paresis. In the ER her symptoms persisted and she remained confused thinking she was still at [facility] and was showing left sided neglect. The time of onset was about 12:50pm. Code stroke was called at 1:25 pm. Evaluation in the emergency department shows an elderly female with a blood pressure 166/82, pulse of 71, afebrile and saturating at 97% on room air. Routine labs showed an unremarkable BMP, glucose of 167 and an unremarkable CBC. INR 0.95.    CT scan of her head without contrast was negative for any acute process. CT head with contrast showed moderate to large right posterior division middle cerebral artery territory matched defect consistent with an infarct. CT angiogram of head and neck showed a filling defect versus short segment high-grade stenosis involving the distal right M1 branch with poor filling of the right M2 branch. Finding is probably due to thromboembolus. She was evaluated by Dr. Murdock in the emergency department was within the window for IV TPA. There was no family on site but her  was called and the risk and benefits were discussed with him over the phone and he consented to IV TPA. IV  "TPA was initiated and she was sent to interventional radiology for possible thrombectomy. 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 Subsequent MRI brain demonstrated large posterior right MCA infarct with small hemorrhagic transformation and mild midline shift due to cerebral edema  Neurocritical care was consulted to provide ongoing care for cerebral edema and large infarct.     Past Medical History    ______________________________________________  Hypertension  Past Surgical History   ______________________________________________  History reviewed. No pertinent surgical history.  Prior to Admission Medications   ______________________________________________  Prior to Admission Medications   Prescriptions Last Dose Informant Patient Reported? Taking?   ASPIRIN PO 3/20/2017 at hs Spouse/Significant Other Yes Yes   Sig: Take 81 mg by mouth At Bedtime       Facility-Administered Medications: None     Allergies   No Known Allergies    Social History   ______________________________________________  Social History     Social History     Marital status:      Spouse name: N/A     Number of children: N/A     Years of education: N/A     Social History Main Topics     Smoking status: Never Smoker     Smokeless tobacco: None     Alcohol use Yes     Drug use: No     Sexual activity: Not Asked     Other Topics Concern     None     Social History Narrative     None       Family History   ______________________________________________  Reviewed and not felt to be contributory.     Review of Systems   ______________________________________________  Review of systems is not obtainable due to patient factors - mental status    Physical Exam   ______________________________________________  Weight:176 lbs 9.42 oz; Height:5' 6\"  Temp: 98.7  F (37.1  C) Temp src: Axillary BP: (!) 131/94   Heart Rate: 61 Resp: 16 SpO2: 94 % O2 Device: Nasal cannula Oxygen Delivery: 2 LPM  General " Appearance:  No acute distress  Neuro:       Mental Status Exam:   Somnolent. Mute. Follows commands.  Mental status is decreased       Cranial Nerves:  Pupils 3 mm, reactive. EOMI. Face sensation is untestable Face - left sided weakness. Tongue and uvula are midline. Other CN are normal           Motor:  Left hemiplegia Tone and bulk are normal           Reflexes:  Normal DTR.Toes downgoing.        Sensory:  untestable                  Coordination:   untestable        Gait:  untestable   Neck: no nuchal rigidity, normal thyroid. No carotid bruits.    Cardiovascular: Regular rate and rhythm, no m/r/g  Lungs: Clear to auscultation  Abdomen: Soft, not tender, not distended  Extremities: No clubbing, no cyanosis, no edema    Data   ______________________________________________  All Data personally reviewed:       Labs:   CBC RESULTS:     Recent Labs  Lab 03/23/17  0555 03/22/17  0515 03/21/17  1327   WBC 8.0 7.9 4.5   RBC 3.73* 3.85 4.22   HGB 11.9 12.2 13.7   HCT 35.7 36.4 40.4   * 182 187     Basic Metabolic Panel:   Recent Labs   Lab Test  03/23/17   0555  03/22/17   0515  03/21/17   1327   NA  148*  146*  143   POTASSIUM  3.4  3.4  3.8   CHLORIDE  116*  113*  108   CO2  24  24  28   BUN  22  16  23   CR  0.70  0.67  0.80   GLC  125*  174*  167*   RADHA  7.9*  8.0*  8.5     Liver panel:  Recent Labs   Lab Test  03/22/17   0515   PROTTOTAL  5.9*   ALBUMIN  3.0*   BILITOTAL  0.4   ALKPHOS  67   AST  22   ALT  16     INR:  Recent Labs   Lab Test  03/22/17   0515  03/21/17   1327   INR  0.98  0.95      Lipid Profile:  Recent Labs   Lab Test  03/21/17   1327   CHOL  207*   HDL  73   LDL  108*   TRIG  130     Thyroid Panel:  Recent Labs   Lab Test  03/21/17   1327   TSH  1.89      Vitamin B12:   Recent Labs   Lab Test  03/21/17   1327   B12  323      Vitamin D level:   Recent Labs   Lab Test  03/21/17   1327   VITDT  16*     A1C:   Recent Labs   Lab Test  03/22/17   0515  03/21/17   1327   A1C  5.7  5.9      Troponin I:   Recent Labs   Lab Test  03/22/17   0515  03/21/17   2215   TROPI  0.031  <0.015     Ammonia: No lab results found.  CK: No lab results found.     CRP inflammation:   Recent Labs   Lab Test  03/21/17   1327   CRP  <2.9     ESR:   Recent Labs   Lab Test  03/21/17   1327   SED  8       KATHARINE: No lab results found.    ANCA: No lab results found.   Drug Screen: No lab results found.  Alcohol level:No lab results found.  UA Results:  No lab results found.  Most Recent 6 Bacteria Isolates From Any Culture (See EPIC Reports for Culture Details):No lab results found.     Cardiac US:   TTE 3/23/17:  Above the ST ridge, the aorta is 3.7 cm. as is the arch. The ascending aorta is borderline dilated.  Left ventricular systolic function is normal. The visual ejection fraction is estimated at 55%. The left ventricle is mildly dilated.  The left atrium is moderately dilated and the right atrium is mildly dilated. A contrast injection (Bubble Study) was performed that was negative for flow across the interatrial septum.  Right ventricular systolic pressure is elevated, consistent with mild to moderate pulmonary hypertension.  There is trace to mild mitral regurgitation and trace to mild aortic regurgitation.  Contrast was used without apparent complications. Would consider a transesophageal echocardiogram if clinically indicated. The study was technically limited.         Imaging:   All imaging studies were reviewed personally  CT head:   Minimal chronic changes. No evidence for intracranial hemorrhage or any acute process  CTA neck/head:  Filling defect versus short segment high-grade stenosis involving the distal right M1 and proximal M2 branch with poor filling  of a right M2 branch. Finding is probably due to a thromboembolus  CT perfusion  Moderate to large right posterior division middle cerebral artery territory matched defect consistent with infarct  Cerebral angiogram  1. Complete occlusion of the posterior  division of the right middle cerebral artery with clot at the M2 origin. The anterior division is widely patent, as is the M1 segment.  2. 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 As such, the longest available neuron Max guide catheter was only able to be advanced to the proximal aspect of the 360 degree loop when completely hubbed at the skin. Mechanical thrombectomy was, therefore, not possible, and was not performed.  3. Minor plaque in the right carotid bulb, but no significant right internal carotid origin stenosis by NASA criteria.  MRI brain:   1. Moderate to large acute ischemic right posterior division middle cerebral artery territory infarct with some petechial hemorrhage.  Difficult to exclude macroscopic hemorrhage. If clinically indicated, CT without contrast might be helpful in further evaluation. There is some localized mass effect with some very minimal right-to-left midline shift.  2. Small old bilateral cerebellar infarcts. In addition, there is a questionable tiny acute ischemic infarct in the left cerebellum which could just be due to due to T2 shine through.  CT head 3/23/17  Evolving right middle cerebral artery distribution infarct. Small amount of petechial hemorrhage within the infarct is  unchanged. Slight increase in the amount of midline shift.

## 2017-03-24 ENCOUNTER — APPOINTMENT (OUTPATIENT)
Dept: PHYSICAL THERAPY | Facility: CLINIC | Age: 77
DRG: 061 | End: 2017-03-24
Attending: INTERNAL MEDICINE
Payer: MEDICARE

## 2017-03-24 ENCOUNTER — APPOINTMENT (OUTPATIENT)
Dept: CT IMAGING | Facility: CLINIC | Age: 77
DRG: 061 | End: 2017-03-24
Attending: FAMILY MEDICINE
Payer: MEDICARE

## 2017-03-24 ENCOUNTER — APPOINTMENT (OUTPATIENT)
Dept: OCCUPATIONAL THERAPY | Facility: CLINIC | Age: 77
DRG: 061 | End: 2017-03-24
Attending: INTERNAL MEDICINE
Payer: MEDICARE

## 2017-03-24 ENCOUNTER — APPOINTMENT (OUTPATIENT)
Dept: SPEECH THERAPY | Facility: CLINIC | Age: 77
DRG: 061 | End: 2017-03-24
Payer: MEDICARE

## 2017-03-24 ENCOUNTER — APPOINTMENT (OUTPATIENT)
Dept: GENERAL RADIOLOGY | Facility: CLINIC | Age: 77
DRG: 061 | End: 2017-03-24
Attending: HOSPITALIST
Payer: MEDICARE

## 2017-03-24 ENCOUNTER — APPOINTMENT (OUTPATIENT)
Dept: GENERAL RADIOLOGY | Facility: CLINIC | Age: 77
DRG: 061 | End: 2017-03-24
Attending: FAMILY MEDICINE
Payer: MEDICARE

## 2017-03-24 LAB
ANION GAP SERPL CALCULATED.3IONS-SCNC: 8 MMOL/L (ref 3–14)
BASE EXCESS BLDV CALC-SCNC: 2 MMOL/L
BUN SERPL-MCNC: 16 MG/DL (ref 7–30)
CALCIUM SERPL-MCNC: 7.8 MG/DL (ref 8.5–10.1)
CHLORIDE SERPL-SCNC: 115 MMOL/L (ref 94–109)
CO2 SERPL-SCNC: 25 MMOL/L (ref 20–32)
CREAT SERPL-MCNC: 0.77 MG/DL (ref 0.52–1.04)
ERYTHROCYTE [DISTWIDTH] IN BLOOD BY AUTOMATED COUNT: 14.5 % (ref 10–15)
GFR SERPL CREATININE-BSD FRML MDRD: 73 ML/MIN/1.7M2
GLUCOSE BLDC GLUCOMTR-MCNC: 106 MG/DL (ref 70–99)
GLUCOSE BLDC GLUCOMTR-MCNC: 123 MG/DL (ref 70–99)
GLUCOSE BLDC GLUCOMTR-MCNC: 124 MG/DL (ref 70–99)
GLUCOSE BLDC GLUCOMTR-MCNC: 137 MG/DL (ref 70–99)
GLUCOSE SERPL-MCNC: 111 MG/DL (ref 70–99)
HCO3 BLDV-SCNC: 25 MMOL/L (ref 21–28)
HCT VFR BLD AUTO: 37.1 % (ref 35–47)
HGB BLD-MCNC: 12.2 G/DL (ref 11.7–15.7)
LACTATE BLD-SCNC: 0.9 MMOL/L (ref 0.7–2.1)
MAGNESIUM SERPL-MCNC: 2.2 MG/DL (ref 1.6–2.3)
MCH RBC QN AUTO: 31.8 PG (ref 26.5–33)
MCHC RBC AUTO-ENTMCNC: 32.9 G/DL (ref 31.5–36.5)
MCV RBC AUTO: 97 FL (ref 78–100)
OSMOLALITY SERPL: 310 MMOL/KG (ref 280–301)
OSMOLALITY SERPL: 311 MMOL/KG (ref 280–301)
OSMOLALITY SERPL: 312 MMOL/KG (ref 280–301)
OSMOLALITY SERPL: 314 MMOL/KG (ref 280–301)
OXYHGB MFR BLDV: 76 %
PCO2 BLDV: 33 MM HG (ref 40–50)
PH BLDV: 7.5 PH (ref 7.32–7.43)
PHOSPHATE SERPL-MCNC: 1.8 MG/DL (ref 2.5–4.5)
PLATELET # BLD AUTO: 135 10E9/L (ref 150–450)
PO2 BLDV: 37 MM HG (ref 25–47)
POTASSIUM SERPL-SCNC: 2.9 MMOL/L (ref 3.4–5.3)
POTASSIUM SERPL-SCNC: 3.1 MMOL/L (ref 3.4–5.3)
POTASSIUM SERPL-SCNC: 3.4 MMOL/L (ref 3.4–5.3)
RBC # BLD AUTO: 3.84 10E12/L (ref 3.8–5.2)
SODIUM SERPL-SCNC: 148 MMOL/L (ref 133–144)
WBC # BLD AUTO: 7.5 10E9/L (ref 4–11)

## 2017-03-24 PROCEDURE — 72125 CT NECK SPINE W/O DYE: CPT

## 2017-03-24 PROCEDURE — 92526 ORAL FUNCTION THERAPY: CPT | Mod: GN | Performed by: SPEECH-LANGUAGE PATHOLOGIST

## 2017-03-24 PROCEDURE — 25000125 ZZHC RX 250: Performed by: INTERNAL MEDICINE

## 2017-03-24 PROCEDURE — 83930 ASSAY OF BLOOD OSMOLALITY: CPT | Performed by: HOSPITALIST

## 2017-03-24 PROCEDURE — 00000146 ZZHCL STATISTIC GLUCOSE BY METER IP

## 2017-03-24 PROCEDURE — 71010 XR CHEST PORT 1 VW: CPT

## 2017-03-24 PROCEDURE — 40000257 ZZH STATISTIC CONSULT NO CHARGE VASC ACCESS

## 2017-03-24 PROCEDURE — 40000133 ZZH STATISTIC OT WARD VISIT: Performed by: OCCUPATIONAL THERAPIST

## 2017-03-24 PROCEDURE — 80048 BASIC METABOLIC PNL TOTAL CA: CPT | Performed by: HOSPITALIST

## 2017-03-24 PROCEDURE — 40000193 ZZH STATISTIC PT WARD VISIT: Performed by: PHYSICAL THERAPIST

## 2017-03-24 PROCEDURE — 85027 COMPLETE CBC AUTOMATED: CPT | Performed by: HOSPITALIST

## 2017-03-24 PROCEDURE — 82805 BLOOD GASES W/O2 SATURATION: CPT | Performed by: INTERNAL MEDICINE

## 2017-03-24 PROCEDURE — 25000128 H RX IP 250 OP 636: Performed by: PSYCHIATRY & NEUROLOGY

## 2017-03-24 PROCEDURE — 97110 THERAPEUTIC EXERCISES: CPT | Mod: GO | Performed by: OCCUPATIONAL THERAPIST

## 2017-03-24 PROCEDURE — 20000003 ZZH R&B ICU

## 2017-03-24 PROCEDURE — 84132 ASSAY OF SERUM POTASSIUM: CPT | Performed by: HOSPITALIST

## 2017-03-24 PROCEDURE — 40000225 ZZH STATISTIC SLP WARD VISIT: Performed by: SPEECH-LANGUAGE PATHOLOGIST

## 2017-03-24 PROCEDURE — 70450 CT HEAD/BRAIN W/O DYE: CPT

## 2017-03-24 PROCEDURE — 25000125 ZZHC RX 250: Performed by: PSYCHIATRY & NEUROLOGY

## 2017-03-24 PROCEDURE — 84100 ASSAY OF PHOSPHORUS: CPT | Performed by: HOSPITALIST

## 2017-03-24 PROCEDURE — 83605 ASSAY OF LACTIC ACID: CPT | Performed by: INTERNAL MEDICINE

## 2017-03-24 PROCEDURE — S0028 INJECTION, FAMOTIDINE, 20 MG: HCPCS | Performed by: INTERNAL MEDICINE

## 2017-03-24 PROCEDURE — 97530 THERAPEUTIC ACTIVITIES: CPT | Mod: GO | Performed by: OCCUPATIONAL THERAPIST

## 2017-03-24 PROCEDURE — 73502 X-RAY EXAM HIP UNI 2-3 VIEWS: CPT

## 2017-03-24 PROCEDURE — 25000125 ZZHC RX 250: Performed by: HOSPITALIST

## 2017-03-24 PROCEDURE — 25000128 H RX IP 250 OP 636: Performed by: RADIOLOGY

## 2017-03-24 PROCEDURE — 97166 OT EVAL MOD COMPLEX 45 MIN: CPT | Mod: GO | Performed by: OCCUPATIONAL THERAPIST

## 2017-03-24 PROCEDURE — 99233 SBSQ HOSP IP/OBS HIGH 50: CPT | Performed by: HOSPITALIST

## 2017-03-24 PROCEDURE — 83735 ASSAY OF MAGNESIUM: CPT | Performed by: HOSPITALIST

## 2017-03-24 PROCEDURE — 97110 THERAPEUTIC EXERCISES: CPT | Mod: GP | Performed by: PHYSICAL THERAPIST

## 2017-03-24 PROCEDURE — 97162 PT EVAL MOD COMPLEX 30 MIN: CPT | Mod: GP | Performed by: PHYSICAL THERAPIST

## 2017-03-24 RX ADMIN — POTASSIUM CHLORIDE 20 MEQ: 29.8 INJECTION, SOLUTION INTRAVENOUS at 04:17

## 2017-03-24 RX ADMIN — SODIUM CHLORIDE: 9 INJECTION, SOLUTION INTRAVENOUS at 22:25

## 2017-03-24 RX ADMIN — MANNITOL 50 G: 20 INJECTION, SOLUTION INTRAVENOUS at 16:51

## 2017-03-24 RX ADMIN — MANNITOL 50 G: 20 INJECTION, SOLUTION INTRAVENOUS at 22:06

## 2017-03-24 RX ADMIN — POTASSIUM CHLORIDE 20 MEQ: 29.8 INJECTION, SOLUTION INTRAVENOUS at 19:50

## 2017-03-24 RX ADMIN — Medication 5 MG/HR: at 12:51

## 2017-03-24 RX ADMIN — FAMOTIDINE 20 MG: 10 INJECTION, SOLUTION INTRAVENOUS at 22:03

## 2017-03-24 RX ADMIN — SODIUM CHLORIDE: 9 INJECTION, SOLUTION INTRAVENOUS at 09:18

## 2017-03-24 RX ADMIN — POTASSIUM CHLORIDE 20 MEQ: 29.8 INJECTION, SOLUTION INTRAVENOUS at 16:46

## 2017-03-24 RX ADMIN — MANNITOL 50 G: 20 INJECTION, SOLUTION INTRAVENOUS at 04:17

## 2017-03-24 RX ADMIN — POTASSIUM PHOSPHATE, MONOBASIC AND POTASSIUM PHOSPHATE, DIBASIC 20 MMOL: 224; 236 INJECTION, SOLUTION INTRAVENOUS at 18:39

## 2017-03-24 RX ADMIN — POTASSIUM CHLORIDE 20 MEQ: 29.8 INJECTION, SOLUTION INTRAVENOUS at 05:23

## 2017-03-24 RX ADMIN — Medication 5 MG/HR: at 04:21

## 2017-03-24 RX ADMIN — Medication 5 MG/HR: at 21:10

## 2017-03-24 RX ADMIN — FAMOTIDINE 20 MG: 10 INJECTION, SOLUTION INTRAVENOUS at 08:44

## 2017-03-24 RX ADMIN — Medication 5 MG/HR: at 19:56

## 2017-03-24 RX ADMIN — MANNITOL 50 G: 20 INJECTION, SOLUTION INTRAVENOUS at 10:27

## 2017-03-24 RX ADMIN — POTASSIUM CHLORIDE 20 MEQ: 29.8 INJECTION, SOLUTION INTRAVENOUS at 17:50

## 2017-03-24 ASSESSMENT — VISUAL ACUITY
OU: BLINKS TO THREAT
OU: BLINKS TO THREAT

## 2017-03-24 NOTE — PROGRESS NOTES
[facility]  [facility] [place]  Neuro-ICU Progress Note       Admission Date: 3/21/2017  Date of Service:03/24/2017   Hospital Day: 4   _________________________________     Main Plans for Today   --continue supportive care  Assessment & Plan   #. (I63.411) Cerebral infarction due to embolism of right middle cerebral artery (H) (primary encounter diagnosis)  --Stroke consistent with embolic event  --Likely undiagnosed atrial fibrillation  -----Continue close monitoring for atrial fibrillation  --Hemorrhagic conversion will prevent anticoagulation at this time.   ---hold antiplatelets for 7 days  --A19 5.9  --TSH normal  #. (G93.6) Cerebral edema (H)  --Related to large R MCA stroke  --Increased on CT from 3/23/17  --continue Mannitol 50g Q6H  #. (I51.7) Atrial dilatation, left  --Likely the sign of atrial fibrillation  #. (I10) Benign essential hypertension  --Keep BP <180 mm Hg  --Management per hospitalists.   #. (E55.9) Vitamin D deficiency  --level 16  --Started vitamin D3 2000U and CoQ10  #. (E78.2) Mixed hyperlipidemia  --  --Not contributory to this stroke  --Will not start statins at this time.   #. DVT Prophylaxis  --Mechanical only due to hemorrhagic conversion  #. PT/OT/Speech  --Start evaluations   #. Nutrition / GI Prophylaxis  --Per recommendations of speech therapy      CODE STATUS: Full Code    Family update by me today: yes    Interval History   Patient presented with sudden onset left sided weakness and collapse. Was shopping and while in the dressing room developed left sided weakness, facial droop, slurred speech, confusion and collapsed to the ground. Staff heard her fall and called 911 after finding her on the ground. Code stroke was called in the ED. CT head with contrast showed moderate to large right posterior division middle cerebral artery territory matched defect consistent with an infarct. CT angiogram of head and neck showed a filling defect versus  "short segment high-grade stenosis involving the distal right M1 branch with poor filling of the right M2 branch. Finding is probably due to thromboembolus. IV tPA was administered and she went to IR for possible thrombectomy. 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 Subsequent MRI brain demonstrated large posterior right MCA infarct with small hemorrhagic transformation and mild midline shift due to cerebral edema.    Overnight she was able to climb out of bed and was found on the floor when the nurse returned to the room. CT head and c-spine negative for acute injury. She continues on mannitol for cerebral edema. Arouses to voice, but drowsy. Follows commands on the right, left hemiparesis. Discussion with family regarding impulsivity and lack of understanding of her limitations. They are understanding and expressed their willingness to stay at her bedside to help monitor.    Physical Exam     Vitals: Blood pressure 131/76, pulse 70, temperature 98.9  F (37.2  C), temperature source Axillary, resp. rate 16, height 1.676 m (5' 6\"), weight 80.1 kg (176 lb 9.4 oz), SpO2 94 %.  Tmax: Temp (24hrs), Av.5  F (36.9  C), Min:98.2  F (36.8  C), Max:98.9  F (37.2  C)    Vital Signs with Ranges  Temp:  [98.2  F (36.8  C)-98.9  F (37.2  C)] 98.9  F (37.2  C)  Heart Rate:  [56-87] 69  BP: (103-150)/() 131/76  SpO2:  [90 %-96 %] 94 %   I&O:     I/O last 3 completed shifts:  In: 3256.67 [I.V.:3256.67]  Out: 2720 [Urine:2720] I/O since admission: 815.42         General Appearance:   No acute distress  Neuro:       Mental Status Exam:   Drowsy, arouses to voice, oriented X1. Minimal speech, dysarthric, no clear aphasia. Mental status is decreased       Cranial Nerves:  Pupils 3 mm, reactive. EOMI. Face sensation is normal. Left facial droop. Tongue and uvula are midline. Other CN are normal           Motor:  5/5 on the right, minimal movement LUE, 3/5 LLE. Tone and bulk are " normal           Reflexes:  Normal DTR. Toes equivocal.        Sensory:  Left neglect                   Coordination:   Intact finger-to-nose on the right, unable to assess on the left due to weakness       Gait:  Unable to assess  Cardiovascular: Regular rate and rhythm, no m/r/g  Lungs: Resp: 16  Both hemithoraces are symmetrical, auscultation of lungs revealed no bronchovesicular sounds, expirium prolongation, wheezing, rhonci and crackles  Abdomen: Soft, not tender, not distended  Skin/Extremities: No clubbing, cyanosis, no edema       Medications   Infusions medications:    - MEDICATION INSTRUCTIONS -       NaCl 75 mL/hr at 03/24/17 0000     - MEDICATION INSTRUCTIONS -       - MEDICATION INSTRUCTIONS -       niCARdipine 40 mg in 200 mL 0.9% NaCl 5 mg/hr (03/24/17 0421)     Schedule medications:    sodium chloride (PF)  10 mL Intracatheter Q8H     cholecalciferol  2,000 Units Oral Daily     co-enzyme Q-10  100 mg Oral Daily     mannitol  50 g Intravenous Q6H     insulin aspart  1-3 Units Subcutaneous TID AC     insulin aspart  1-3 Units Subcutaneous At Bedtime     sodium chloride (PF)  10 mL Intravenous Once     sodium chloride (PF)  3 mL Intracatheter Q8H     famotidine  20 mg Intravenous BID     PRN medications:lidocaine 4%, heparin lock flush, sodium chloride (PF), glucose **OR** dextrose **OR** glucagon, - MEDICATION INSTRUCTIONS -, HOLD MEDICATION, metoclopramide **OR** metoclopramide, - MEDICATION INSTRUCTIONS -, - MEDICATION INSTRUCTIONS -, labetalol, naloxone, lidocaine, lidocaine 4%, sodium chloride (PF), potassium chloride, potassium chloride, potassium chloride, potassium chloride with lidocaine, potassium chloride, magnesium sulfate, acetaminophen, senna-docusate, magnesium hydroxide, bisacodyl, ondansetron **OR** ondansetron, prochlorperazine **OR** prochlorperazine **OR** prochlorperazine, hydrALAZINE  Data       Labotary Data:   All data was reviewed by me personally  CBC RESULTS:  Recent Labs    Lab Test  03/24/17   0320  03/23/17   0555  03/22/17   0515  03/21/17   1327   WBC  7.5  8.0  7.9  4.5   RBC  3.84  3.73*  3.85  4.22   HGB  12.2  11.9  12.2  13.7   HCT  37.1  35.7  36.4  40.4   PLT  135*  121*  182  187     Basic Metabolic Panel:  Recent Labs   Lab Test  03/24/17   0320  03/23/17   1530  03/23/17   0555  03/22/17   0515  03/21/17   1327   NA  148*   --   148*  146*  143   POTASSIUM  3.1*  3.2*  3.4  3.4  3.8   CHLORIDE  115*   --   116*  113*  108   CO2  25   --   24  24  28   BUN  16   --   22  16  23   CR  0.77   --   0.70  0.67  0.80   GLC  111*   --   125*  174*  167*   RADHA  7.8*   --   7.9*  8.0*  8.5     Liver panel:  Recent Labs   Lab Test  03/22/17   0515   PROTTOTAL  5.9*   ALBUMIN  3.0*   BILITOTAL  0.4   ALKPHOS  67   AST  22   ALT  16     Coagulation  Recent Labs   Lab Test  03/22/17   0515  03/21/17   1327   INR  0.98  0.95   PTT   --   29      Lipid Profile:  Recent Labs   Lab Test  03/21/17   1327   CHOL  207*   HDL  73   LDL  108*   TRIG  130     Thyroid Panel:  Recent Labs   Lab Test  03/21/17   1327   TSH  1.89      Vitamin B12:   Recent Labs   Lab Test  03/21/17   1327   B12  323      Vitamin D:  Recent Labs   Lab Test  03/21/17   1327   VITDT  16*     A1C:   Recent Labs   Lab Test  03/22/17   0515  03/21/17   1327   A1C  5.7  5.9     Troponin I:   Recent Labs   Lab Test  03/23/17   0935  03/22/17   0515  03/21/17   2215   TROPI  0.064*  0.031  <0.015     Serum Osmolality:  Recent Labs   Lab Test  03/24/17   0320  03/23/17   2200  03/23/17   1530  03/23/17   0935   OSMOSERUM  311*  306*  308*  307*            Cardiac US:   TTE 3/23/17:  Above the ST ridge, the aorta is 3.7 cm. as is the arch. The ascending aorta is borderline dilated. Left ventricular systolic function is normal. The visual ejection fraction is estimated at 55%. The left ventricle is mildly dilated. The left atrium is moderately dilated and the right atrium is mildly dilated. A contrast injection (Bubble  Study) was performed that was negative for flow across the interatrial septum. Right ventricular systolic pressure is elevated, consistent with mild to moderate pulmonary hypertension. There is trace to mild mitral regurgitation and trace to mild aortic regurgitation. Contrast was used without apparent complications. Would consider a transesophageal echocardiogram if clinically indicated. The study was technically limited.       Imaging:   All imaging studies were reviewed personally  CT head 3/21/17:   --Minimal chronic changes. No evidence for intracranial hemorrhage or any acute process    CTA neck/head 3/21/17:  --Filling defect versus short segment high-grade stenosis involving the distal right M1 and proximal M2 branch with poor filling of a right M2 branch. Finding is probably due to a thromboembolus    CT perfusion 3/21/17:  --Moderate to large right posterior division middle cerebral artery territory matched defect consistent with infarct    Cerebral angiogram 3/21/17:  1. Complete occlusion of the posterior division of the right middle cerebral artery with clot at the M2 origin. The anterior division is widely patent, as is the M1 segment.  2. 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 As such, the longest available neuron Max guide catheter was only able to be advanced to the proximal aspect of the 360 degree loop when completely hubbed at the skin. Mechanical thrombectomy was, therefore, not possible, and was not performed.  3. Minor plaque in the right carotid bulb, but no significant right internal carotid origin stenosis by NASA criteria.    MRI brain 3/22/17:   1. Moderate to large acute ischemic right posterior division middle cerebral artery territory infarct with some petechial hemorrhage. Difficult to exclude macroscopic hemorrhage. If clinically indicated, CT without contrast might be helpful in further evaluation. There  is some localized mass effect with some very minimal right-to-left midline shift.  2. Small old bilateral cerebellar infarcts. In addition, there is a questionable tiny acute ischemic infarct in the left cerebellum which could just be due to due to T2 shine through.    CT head 3/23/17:  --Evolving right middle cerebral artery distribution infarct. Small amount of petechial hemorrhage within the infarct is unchanged. Slight increase in the amount of midline shift.    CT head 3/24/17:  1. Evolving large area of infarction right hemisphere.  2. No hemorrhage.  3. Moderate mass effect as previously described with some midline shift to the left but no interval change.    CT cervical spine 3/24/17:  1. No fracture or acute abnormality.  2. Minimal degenerative change as described.    Time Spent on this Encounter      Time:   Neurocritical care time:  I spent 40 minutes bedside and on the inpatient unit today managing the neurocritical care of Marcia Kelley in relation to the issues listed in this note. Patient has very high risk of deterioration      Ann-Marie Cochran, FNP-BC

## 2017-03-24 NOTE — PROGRESS NOTES
"[facility]  Hospitalist Progress Note        Dipakluca Lucero, DO  03/24/2017        Interval History:      Patient more alert, continues to be intermittently somnolent, had a fall. Discussed at length with family at bedside.          Assessment and Plan:        Marcia Kelley is a 76 year old female with no known significant medical hx except for possible HTN and is on a baby aspirin daily who presents with sudden onset left sided weakness and collapse.       Acute right MCA territory stroke Associated with sudden onset left sided weakness, facial droop, slurred speech, confusion and collapsed. CTA as below. S/p iv tPA and and IR thrombectomy was attempted but was unsuccessful due to the anatomy of her prox R CCA with tortuosity and a significant 360 degree loop.    - S/p iv tPA.    - Appreciate Neurology consultation.    - IVF.    - ASA/AC per Neurology discretion.    - Keep SBP < 180 and DBP < 105. IV prn's and iv Nicardipine available if needed.    - Therapies.    - Mannitol initiated.        Mechanical fall: On 3/24. Per notes, response called for fall.    - Imaging including CT head and neck as well as xrays negative.    - Monitor.    - Up with assist.     Vitamin D deficiency: Returned low on 3/22.    - Vitamin D supplementation.       Possible HTN She denies any medical hx but her  reports a longstanding hx of HTN. She does not go to doctors as she thinks they are \"of no use\" according to her . BP mildly elevated her in the ER.    - Will monitor and treat as appropriate      DVT Prophylaxis: Pneumatic Compression Devices      Code: Full Code      Disposition: Pending clinical course. Continues on mannitol therapy in ICU at this time.                    Physical Exam:      Heart Rate: 69    Blood pressure 131/76, pulse 70, temperature 98.9  F (37.2  C), temperature source Axillary, resp. rate 16, height 1.676 m (5' 6\"), weight 80.1 kg (176 lb 9.4 oz), SpO2 94 %.    Vitals: "    17 1300 17 1649   Weight: 81.6 kg (179 lb 14.3 oz) 80.1 kg (176 lb 9.4 oz)       Vital Sign Ranges  Temperature Temp  Av.5  F (36.9  C)  Min: 98.2  F (36.8  C)  Max: 98.9  F (37.2  C)   Blood pressure Systolic (24hrs), Av , Min:103 , Max:154        Diastolic (24hrs), Av, Min:58, Max:125      Pulse No Data Recorded   Respirations No Data Recorded   Pulse oximetry SpO2  Av.8 %  Min: 90 %  Max: 96 %     Vital Signs with Ranges  Temp:  [98.2  F (36.8  C)-98.9  F (37.2  C)] 98.9  F (37.2  C)  Heart Rate:  [56-87] 69  BP: (103-154)/() 131/76  SpO2:  [90 %-96 %] 94 %    I/O Last 3 Shifts:   I/O last 3 completed shifts:  In: 3256.67 [I.V.:3256.67]  Out: 2720 [Urine:2720]    I/O past 24 hours:     Intake/Output Summary (Last 24 hours) at 17 0727  Last data filed at 17 0600   Gross per 24 hour   Intake          3031.67 ml   Output             2720 ml   Net           311.67 ml     GENERAL: NAD. Less somnolent. Less lethargic.   HEENT: Normocephalic. Nares normal.   LUNGS: Clear to auscultation. No dyspnea at rest.   HEART: Regular rate. Extremities perfused.   ABDOMEN: Soft, nontender, and nondistended. Positive bowel sounds.   EXTREMITIES: No LE edema noted.   NEUROLOGIC: Weakness. Unable to follow commands.          Prior to Admission Medications:        Prescriptions Prior to Admission   Medication Sig Dispense Refill Last Dose     ASPIRIN PO Take 81 mg by mouth At Bedtime    3/20/2017 at             Medications:        Current Facility-Administered Medications   Medication Last Rate     - MEDICATION INSTRUCTIONS -       NaCl 75 mL/hr at 17 0000     - MEDICATION INSTRUCTIONS -       - MEDICATION INSTRUCTIONS -       niCARdipine 40 mg in 200 mL 0.9% NaCl 5 mg/hr (17 0421)     Current Facility-Administered Medications   Medication Dose Route Frequency     sodium chloride (PF)  10 mL Intracatheter Q8H     cholecalciferol  2,000 Units Oral Daily     co-enzyme  Q-10  100 mg Oral Daily     mannitol  50 g Intravenous Q6H     insulin aspart  1-3 Units Subcutaneous TID AC     insulin aspart  1-3 Units Subcutaneous At Bedtime     sodium chloride (PF)  10 mL Intravenous Once     sodium chloride (PF)  3 mL Intracatheter Q8H     famotidine  20 mg Intravenous BID     Current Facility-Administered Medications   Medication Dose Route Frequency     lidocaine 4%   Topical Once PRN     heparin lock flush  2-5 mL Intracatheter Once PRN     sodium chloride (PF)  10-20 mL Intracatheter Q1H PRN     glucose  15-30 g Oral Q15 Min PRN    Or     dextrose  25-50 mL Intravenous Q15 Min PRN    Or     glucagon  1 mg Subcutaneous Q15 Min PRN     - MEDICATION INSTRUCTIONS -   Does not apply Continuous PRN     HOLD MEDICATION   Does not apply HOLD     metoclopramide  5 mg Oral Q6H PRN    Or     metoclopramide  5 mg Intravenous Q6H PRN     - MEDICATION INSTRUCTIONS -   Does not apply Continuous PRN     - MEDICATION INSTRUCTIONS -   Does not apply Continuous PRN     labetalol  10-20 mg Intravenous Q10 Min PRN     naloxone  0.1-0.4 mg Intravenous Q2 Min PRN     lidocaine  1 mL Other Q1H PRN     lidocaine 4%   Topical Q1H PRN     sodium chloride (PF)  3 mL Intracatheter Q1H PRN     potassium chloride  20-40 mEq Oral Q2H PRN     potassium chloride  20-40 mEq Oral or Feeding Tube Q2H PRN     potassium chloride  10 mEq Intravenous Q1H PRN     potassium chloride with lidocaine  10 mEq Intravenous Q1H PRN     potassium chloride  20 mEq Intravenous Q1H PRN     magnesium sulfate  4 g Intravenous Q4H PRN     acetaminophen  650 mg Rectal Q4H PRN     senna-docusate  1-2 tablet Oral BID PRN     magnesium hydroxide  30 mL Oral Daily PRN     bisacodyl  10 mg Rectal Daily PRN     ondansetron  4 mg Oral Q6H PRN    Or     ondansetron  4 mg Intravenous Q6H PRN     prochlorperazine  5 mg Intravenous Q6H PRN    Or     prochlorperazine  5 mg Oral Q6H PRN    Or     prochlorperazine  12.5 mg Rectal Q12H PRN     hydrALAZINE   10-20 mg Intravenous Q30 Min PRN            Data:      Lab data reviewed.     Recent Labs  Lab 03/24/17  0320 03/23/17  1530 03/23/17  0935 03/23/17  0555 03/22/17  0515 03/21/17  2215 03/21/17  1327   HGB 12.2  --   --  11.9 12.2  --  13.7   MCV 97  --   --  96 95  --  96   *  --   --  121* 182  --  187   INR  --   --   --   --  0.98  --  0.95   *  --   --  148* 146*  --  143   POTASSIUM 3.1* 3.2*  --  3.4 3.4  --  3.8   CHLORIDE 115*  --   --  116* 113*  --  108   CO2 25  --   --  24 24  --  28   BUN 16  --   --  22 16  --  23   CR 0.77  --   --  0.70 0.67  --  0.80   ANIONGAP 8  --   --  8 9  --  7   RADHA 7.8*  --   --  7.9* 8.0*  --  8.5   *  --   --  125* 174*  --  167*   TROPI  --   --  0.064*  --  0.031 <0.015  --            Imaging:      Imaging data reviewed.     Dr. Dipak Lucero D.O.  [facility]  Pager [phone]

## 2017-03-24 NOTE — SIGNIFICANT EVENT
Preparing pt for morning bath and pt attempted to get out of bed independently while Rn was out of room gathering supplies.  Pt found on ground at 0630.  Immobilized and house doc called to bedside.  Pt denies pain and remains alert and oriented and able to state she attempted to get up alone, no obvious injuries at this time.  Pt sent to CT.

## 2017-03-24 NOTE — PLAN OF CARE
Pt speaking in more complete sentences overnight.  Blood pressure controled with nicardipine 5mg/hr.  Pt denied pain throughout night.  Good urine output.  Family updated at bedside.  WIll continue to monitor.

## 2017-03-24 NOTE — PLAN OF CARE
Problem: Goal Outcome Summary  Goal: Goal Outcome Summary  OT:Evaluation completed and treatment initiated. Pt. resides w/her spouse, typically indep./active at baseline. Pt. volunteers regularly, cares for grandchildren 1 day/week, drives. Currently, pt. quite sleepy/lethargic. Pt. Able to open eyes, difficulty opening L eye, mainly stayed closed, unless provided w/vc's. Pt. W/strong R gaze preference, able to turn head to L w/v'cs, difficult locating therapist without mod. cues due to field cut, not tracking to L, upward gaze. Pt. W/ RUE tremors(new), nursing/MD aware. Pt. able to demo. slight scap. elevation, otherwise LUE flaccid. Light touch sensation intact LUE. Pt. oriented X 3, responded to safety ?'s appropriately, STM recall 3/3 after short delay. Unable to assist mobility a this time, due to fatigue. DC recs.:Anticipate/rec. ARU at this time.

## 2017-03-24 NOTE — PROGRESS NOTES
03/24/17 1500   Quick Adds   Type of Visit Initial PT Evaluation   Living Environment   Lives With spouse   Living Arrangements house   Number of Stairs to Enter Home 1   Number of Stairs Within Home 1   Transportation Available car;family or friend will provide   Self-Care   Dominant Hand right   Usual Activity Tolerance good   Current Activity Tolerance poor   Regular Exercise other (see comments)  (active )   Equipment Currently Used at Home none   Activity/Exercise/Self-Care Comment Pt. 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   Functional Level Prior   Ambulation 0-->independent   Transferring 0-->independent   Toileting 0-->independent   Bathing 0-->independent   Dressing 0-->independent   Eating 0-->independent   Communication 0-->understands/communicates without difficulty   Swallowing 0-->swallows foods/liquids without difficulty   Cognition 0 - no cognition issues reported   Fall history within last six months no   Which of the above functional risks had a recent onset or change? ambulation;transferring;toileting;bathing;dressing   Prior Functional Level Comment See above   General Information   Onset of Illness/Injury or Date of Surgery - Date 03/21/17   Referring Physician Clifton Moore MD   Patient/Family Goals Statement Rehab after this hospital stay   Pertinent History of Current Problem (include personal factors and/or comorbidities that impact the POC) Admitted with sudden left sided weakness while shopping. DX: Right MCA stroke, cerebral edema secondary to stroke. Pt. fell off left side of bed trying to get out of bed 3/24/17. Head CT and xrays negative for fxs. PMHX: HPTN on a baby aspirin.    Precautions/Limitations fall precautions;oxygen therapy device and L/min;other (see comments)  (bleeding precautions)   Weight-Bearing Status - LLE full weight-bearing   Weight-Bearing Status - RLE full  "weight-bearing   Heart Disease Risk Factors High blood pressure   General Observations Pt. restless laying in bed. Spouse, dtr., and a sister-in-law present and very supportive   Cognitive Status Examination   Orientation orientation to person, place and time   Level of Consciousness lethargic/somnolent;other (see comments)  (wants a diet coke. RN thickening diet coke during PT tx)   Follows Commands and Answers Questions 75% of the time   Personal Safety and Judgment impaired  (fall OOB this morning)   Memory (deger to OT)   Pain Assessment   Patient Currently in Pain Yes, see Vital Sign flowsheet   Integumentary/Edema   Integumentary/Edema Comments left hand edematous   Posture    Posture Comments Pt. with head turn to right throughout Tx. Able to locate therapist on left side of visual field with much effort. When pt. asked to move left hand, she reached over with her right hand, found her left hand, and manually moved fingers with her right hand.   Range of Motion (ROM)   ROM Comment LE ROMs WFLs. Pt. stated \"ouch\" at endrange of left heelslide and stated pain is in her buttocks. Right UE WFLs. Left UE: no AROM noted spontaneously or to command. PROMs WFLs with should flex. to 90 degrees only.   Strength   Strength Comments Antigravity strength right UE, right LE. Left hip flex. appears 3-/5, quad strength 2/5, DF 2-/5, invers. 3-/5, anthony. 2/5.   Bed Mobility   Bed Mobility Comments NA as pt. having PVCs. Sitting at EOB held this session.   Transfer Skills   Transfer Comments NA as pt. having PVCs. Sitting at EOB held this session.   Gait   Gait Comments Not appropriate.   Balance   Balance Comments unable to assess this session due to PVCs.   Sensory Examination   Sensory Perception Comments Pt. able to sense light touch right UE and LE. Pt. unable to feel light touch left LE or UE.   Coordination   Coordination no deficits were identified   Muscle Tone   Muscle Tone Comments Left LE WFLs. Left UE is flaccid. " "  General Therapy Interventions   Planned Therapy Interventions bed mobility training;gait training;ROM;strengthening;stretching;transfer training;balance training;neuromuscular re-education   Clinical Impression   Criteria for Skilled Therapeutic Intervention yes, treatment indicated   PT Diagnosis Impaired mobility, likely balance defecits, decreased strength and ROM left LE, right UE flaccid and without spontaneous or mvt. to command, left neglect   Influenced by the following impairments Left neglect, decreased sensation left extremeties, flaccid left UE, decreased strength left LE   Functional limitations due to impairments Falls risk, assist required for all mobility, balance defecits, assist for ADLs   Clinical Presentation Evolving/Changing   Clinical Presentation Rationale clinical judgement, indep. prior to admission, assist for all mobility, assist for balance in sitting, falls risk   Clinical Decision Making (Complexity) Moderate complexity   Therapy Frequency` other (see comments)  (daily, increasing to twice daily as able to tolerate)   Predicted Duration of Therapy Intervention (days/wks) 7 days   Anticipated Equipment Needs at Discharge other (see comments)  (too early to determine)   Anticipated Discharge Disposition Acute Rehabilitation Facility   Risk & Benefits of therapy have been explained Yes   Patient, Family & other staff in agreement with plan of care Yes   Clinical Impression Comments Pt. will benefit from skilled PT to promote indep. with mobility and ADLs.    [facility] [employer] TM \"6 Clicks\"   2016, Trustees of [facility], under license to [employer].  All rights reserved.   6 Clicks Short Forms Basic Mobility Inpatient Short Form   [facility] AM-PAC  \"6 Clicks\" V.2 Basic Mobility Inpatient Short Form   1. Turning from your back to your side while in a flat bed without using bedrails? 3 - A Little   2. Moving from lying on your back to sitting on the side of a flat " bed without using bedrails? 2 - A Lot   3. Moving to and from a bed to a chair (including a wheelchair)? 1 - Total   4. Standing up from a chair using your arms (e.g., wheelchair, or bedside chair)? 1 - Total   5. To walk in hospital room? 1 - Total   6. Climbing 3-5 steps with a railing? 1 - Total   Basic Mobility Raw Score (Score out of 24.Lower scores equate to lower levels of function) 9   Total Evaluation Time   Total Evaluation Time (Minutes) 15

## 2017-03-24 NOTE — PROGRESS NOTES
03/24/17 1000   Quick Adds   Type of Visit Initial Occupational Therapy Evaluation   Living Environment   Lives With spouse   Living Arrangements house   Number of Stairs to Enter Home 1  (via garage)   Number of Stairs Within Home (1)   Transportation Available car;family or friend will provide   Self-Care   Dominant Hand right   Usual Activity Tolerance good   Current Activity Tolerance poor   Regular Exercise other (see comments)  (active/gardening)   Equipment Currently Used at Home none   Activity/Exercise/Self-Care Comment Pt. 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   Functional Level Prior   Ambulation 0-->independent   Transferring 0-->independent   Toileting 0-->independent   Bathing 0-->independent   Dressing 0-->independent   Eating 0-->independent   Communication 0-->understands/communicates without difficulty   Swallowing 0-->swallows foods/liquids without difficulty   Cognition 0 - no cognition issues reported   Fall history within last six months no   Which of the above functional risks had a recent onset or change? ambulation;transferring;toileting;bathing;dressing   General Information   Onset of Illness/Injury or Date of Surgery - Date 03/21/17   Referring Physician Dr. Clifton Moore   Additional Occupational Profile Info/Pertinent History of Current Problem OT:Marcia Kelley is a 76 year old female with no known significant medical hx except for possible HTN and is on a baby aspirin daily who presents with sudden onset left sided weakness and collapse. Pt. dx. w/ acute R MCA territory stroke, s/p tPA. Pt. apparently had a fall OOB earlier today, CT/Xrays negative for fx.   Precautions/Limitations fall precautions;oxygen therapy device and L/min;swallowing precautions   General Observations Pt. with stron R gaze preference, RUE =noteable trmeors, new since fall per family, nursing/MD aware, family present,  no c/o pain, wanting orange juice   General Info Comments Pt. very active PTA, good family support   Cognitive Status Examination   Orientation orientation to person, place and time   Level of Consciousness alert;lethargic/somnolent  (difficulty opening L eye;sleepy, diff. keeping eyes open)   Able to Follow Commands mild impairment;moderate impairment;success, 1-step commands   Personal Safety (Cognitive) moderate impairment;decreased awareness, need for assist;decreased awareness, need for safety;decreased insight to deficits   Memory impaired  (STM recall 3/3 words)   Cognitive Comment WIll complete   Visual Perception   Visual Perception Comments unable to track past midline to L, upward gaze, field cut/L neglect apparent   Sensory Examination   Sensory Comments able to feel light touch LUE   Pain Assessment   Patient Currently in Pain No   Integumentary/Edema   Integumentary/Edema Comments L hand edematous   Range of Motion (ROM)   ROM Comment RUE=WNL;LUE:flaccid;able to demo. slight scap.elev.   Strength   Strength Comments RUE:WNL;LUE:flaccid   Hand Strength   Hand Strength Comments R=WNL/WFL;L=0/5    Transfer Skills   Transfer Comments unable to asses, pt. sleepy  (will assess next session as able)   Toilet Transfer   Toilet Transfer Comments standard toilet at home   Balance   Balance Comments NT   Lower Body Dressing   Level of [place]: Dress Lower Body dependent (less than 25% patients effort)   Instrumental Activities of Daily Living (IADL)   Previous Responsibilities meal prep;housekeeping;laundry;driving;  (volunteers regularly)   Activities of Daily Living Analysis   Impairments Contributing to Impaired Activities of Daily Living balance impaired;cognition impaired;coordination impaired;motor control impaired;muscle tone abnormal;postural control impaired;ROM decreased;sensation decreased;strength decreased   General Therapy Interventions   Planned Therapy Interventions ADL  "retraining;cognition;fine motor coordination training;neuromuscular re-education;ROM;strengthening;visual perception;progressive activity/exercise   Clinical Impression   Criteria for Skilled Therapeutic Interventions Met yes, treatment indicated   OT Diagnosis Decline in ADL perf.   Influenced by the following impairments impaired balance, Lsided-weakness, L neglect, impaired baalnce, decreased sensation, RUE incoord./tremors   Assessment of Occupational Performance 3-5 Performance Deficits   Identified Performance Deficits Pt. needing max. A-dep. w/ ADl's/mobility due to above impairments   Clinical Decision Making (Complexity) Moderate complexity   Therapy Frequency daily  (daily-2X/day)   Predicted Duration of Therapy Intervention (days/wks) 5 days   Anticipated Discharge Disposition Acute Rehabilitation Facility   Risks and Benefits of Treatment have been explained. Yes   Patient, Family & other staff in agreement with plan of care Yes   Clinical Impression Comments OT:Skilled OT intervention rec. while hosp. to assist pt. w/return to PLOF/achieve maximal ADL indep., as well as assist w/DC planning.   [facility] [employer] TM \"6 Clicks\"   2016, Trustees of [facility], under license to [employer].  All rights reserved.   6 Clicks Short Forms Daily Activity Inpatient Short Form   [facility] AM-PAC  \"6 Clicks\" Daily Activity Inpatient Short Form   1. Putting on and taking off regular lower body clothing? 1 - Total   2. Bathing (including washing, rinsing, drying)? 1 - Total   3. Toileting, which includes using toilet, bedpan or urinal? 1 - Total   4. Putting on and taking off regular upper body clothing? 2 - A Lot   5. Taking care of personal grooming such as brushing teeth? 2 - A Lot   6. Eating meals? 2 - A Lot   Daily Activity Raw Score (Score out of 24.Lower scores equate to lower levels of function) 9   Total Evaluation Time   Total Evaluation Time (Minutes) 16     "

## 2017-03-24 NOTE — SIGNIFICANT EVENT
0634 - Fri 24 Mar 2017   357     # unwitnessed fall - found L side -   -- 2  L hemiparesis   -- ?L lateral fronto-temporal contusion   -- no ICH or change in stroke/mild midline shift to L   -- no acute C-spine injury   -- ?mild L hip/ lateral thigh contusion - no fx/ acute trauma     --> return to ICU - no change in plan   --> d/w Dr العراقي/ [facility]       HU See MD /   [place] Physician / [phone] (p)   _________________________________    Hx (RN, Fam) -   - ischemic stroke (acute R MCA) - tPA - hemorrhage - L hemiparesis   - 76F - ?htn - vitDdefic     - up on own against instructions   - found down floor L side - responsive at baseline - no LOC noted   - ?slid down side bed - pt reports but ?historian     - alert cooperative   - looks ok - nad - lying floor L side head on pillow Ccollar in place   - breathing easily nl rate effort depth   - color ok     -- unwitnessed fall   -- unreliable historian     --> C collar/ backboard   --> CT head w/o   --> CT C spine       0745 -   - done with CT - pend - cont collar/board   - c/o L hip pain   -- ?fx   --> XR pelvis + L hip cross table lateral       0800 -   - d/w Dr Silva/ [facility]   - CT head unchanged - cont slight midline shift to L   - CT C-spine - no acute fx/ dislocation   -- C-spine cleared   --> d/c C-collar/ board on return to ICU   - no posterior midline neck tenderness   - 2cm sarah very light purple raised (2-3mm) lump L lateral fronto-temporal region   -- ?mild trauma 2  impact with floor   --> observe     - L hip XR - no fx/ dislocation (my read - Rad confirmed)

## 2017-03-24 NOTE — PLAN OF CARE
Problem: Goal Outcome Summary  Goal: Goal Outcome Summary  Outcome: Therapy, progress toward functional goals as expected  SLP:  Pt seen to f/u for swallowing. Pt noted to be more awake today and motivated for therapy.  Pt seen with PO trials of small ice chips,  nectar thick liquids and apple sauce via spoon and cup.  Pt noted to take extra time for oral transit. Swallow noted to be delayed w/ reduced laryngeal elevation; however, tolerated nectar thick liquids and puree solids w/o outward s/sx of aspiration.  Recommend initiating PO diet with Nectar thick full liquids, pt should be cued to take small sips/bites, allow time for pt to swallow, upright and alert for PO.  Monitor for s/sx of aspiration with PO.  ST will plan to f/u for diet tolerance and ability to adv diet tomorrow.

## 2017-03-24 NOTE — PLAN OF CARE
Problem: Goal Outcome Summary  Goal: Goal Outcome Summary  Outcome: Therapy, unable to show any progress toward functional goals  PT: At baseline, pt. Indep. With all mobility, amb. Up to a mile without AD. Indep. With all ADLs and IADLs. 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 Pt. Lives with her spouse in a house with 1 step to enter and 1 step within the house. Currently, pt. Presents with decreased strength left LE, flaccid left UE, left neglect, and diminished sensation left LE. Pt. Able to follow commands 75% of the time. Unable to sit at EOB this Tx session as pt. Developed PVCs toward the end of eval. And Tx. Recommend ARF upon DC due to indep. Status prior to CVI.

## 2017-03-25 ENCOUNTER — APPOINTMENT (OUTPATIENT)
Dept: OCCUPATIONAL THERAPY | Facility: CLINIC | Age: 77
DRG: 061 | End: 2017-03-25
Payer: MEDICARE

## 2017-03-25 ENCOUNTER — APPOINTMENT (OUTPATIENT)
Dept: SPEECH THERAPY | Facility: CLINIC | Age: 77
DRG: 061 | End: 2017-03-25
Payer: MEDICARE

## 2017-03-25 ENCOUNTER — APPOINTMENT (OUTPATIENT)
Dept: PHYSICAL THERAPY | Facility: CLINIC | Age: 77
DRG: 061 | End: 2017-03-25
Payer: MEDICARE

## 2017-03-25 LAB
ANION GAP SERPL CALCULATED.3IONS-SCNC: 7 MMOL/L (ref 3–14)
BUN SERPL-MCNC: 14 MG/DL (ref 7–30)
CALCIUM SERPL-MCNC: 7.5 MG/DL (ref 8.5–10.1)
CHLORIDE SERPL-SCNC: 116 MMOL/L (ref 94–109)
CO2 SERPL-SCNC: 25 MMOL/L (ref 20–32)
CREAT SERPL-MCNC: 0.59 MG/DL (ref 0.52–1.04)
ERYTHROCYTE [DISTWIDTH] IN BLOOD BY AUTOMATED COUNT: 13.9 % (ref 10–15)
GFR SERPL CREATININE-BSD FRML MDRD: ABNORMAL ML/MIN/1.7M2
GLUCOSE BLDC GLUCOMTR-MCNC: 121 MG/DL (ref 70–99)
GLUCOSE BLDC GLUCOMTR-MCNC: 130 MG/DL (ref 70–99)
GLUCOSE BLDC GLUCOMTR-MCNC: 93 MG/DL (ref 70–99)
GLUCOSE BLDC GLUCOMTR-MCNC: 93 MG/DL (ref 70–99)
GLUCOSE SERPL-MCNC: 125 MG/DL (ref 70–99)
HCT VFR BLD AUTO: 34.1 % (ref 35–47)
HGB BLD-MCNC: 11.3 G/DL (ref 11.7–15.7)
MAGNESIUM SERPL-MCNC: 2.3 MG/DL (ref 1.6–2.3)
MAGNESIUM SERPL-MCNC: 2.4 MG/DL (ref 1.6–2.3)
MCH RBC QN AUTO: 31.5 PG (ref 26.5–33)
MCHC RBC AUTO-ENTMCNC: 33.1 G/DL (ref 31.5–36.5)
MCV RBC AUTO: 95 FL (ref 78–100)
OSMOLALITY SERPL: 306 MMOL/KG (ref 280–301)
OSMOLALITY SERPL: 310 MMOL/KG (ref 280–301)
OSMOLALITY SERPL: 313 MMOL/KG (ref 280–301)
PHOSPHATE SERPL-MCNC: 2.3 MG/DL (ref 2.5–4.5)
PLATELET # BLD AUTO: 107 10E9/L (ref 150–450)
POTASSIUM SERPL-SCNC: 3.2 MMOL/L (ref 3.4–5.3)
POTASSIUM SERPL-SCNC: 3.4 MMOL/L (ref 3.4–5.3)
POTASSIUM SERPL-SCNC: 3.4 MMOL/L (ref 3.4–5.3)
POTASSIUM SERPL-SCNC: 3.9 MMOL/L (ref 3.4–5.3)
RBC # BLD AUTO: 3.59 10E12/L (ref 3.8–5.2)
SODIUM SERPL-SCNC: 148 MMOL/L (ref 133–144)
WBC # BLD AUTO: 6.4 10E9/L (ref 4–11)

## 2017-03-25 PROCEDURE — 85027 COMPLETE CBC AUTOMATED: CPT | Performed by: HOSPITALIST

## 2017-03-25 PROCEDURE — S0028 INJECTION, FAMOTIDINE, 20 MG: HCPCS | Performed by: INTERNAL MEDICINE

## 2017-03-25 PROCEDURE — 83735 ASSAY OF MAGNESIUM: CPT | Performed by: HOSPITALIST

## 2017-03-25 PROCEDURE — 84132 ASSAY OF SERUM POTASSIUM: CPT | Performed by: HOSPITALIST

## 2017-03-25 PROCEDURE — 92526 ORAL FUNCTION THERAPY: CPT | Mod: GN

## 2017-03-25 PROCEDURE — 25000125 ZZHC RX 250: Performed by: PSYCHIATRY & NEUROLOGY

## 2017-03-25 PROCEDURE — 25000128 H RX IP 250 OP 636: Performed by: PSYCHIATRY & NEUROLOGY

## 2017-03-25 PROCEDURE — 84100 ASSAY OF PHOSPHORUS: CPT | Performed by: HOSPITALIST

## 2017-03-25 PROCEDURE — 40000133 ZZH STATISTIC OT WARD VISIT: Performed by: OCCUPATIONAL THERAPIST

## 2017-03-25 PROCEDURE — 40000193 ZZH STATISTIC PT WARD VISIT

## 2017-03-25 PROCEDURE — 83930 ASSAY OF BLOOD OSMOLALITY: CPT | Performed by: PSYCHIATRY & NEUROLOGY

## 2017-03-25 PROCEDURE — 84132 ASSAY OF SERUM POTASSIUM: CPT | Performed by: INTERNAL MEDICINE

## 2017-03-25 PROCEDURE — 25000125 ZZHC RX 250: Performed by: HOSPITALIST

## 2017-03-25 PROCEDURE — 83735 ASSAY OF MAGNESIUM: CPT | Performed by: INTERNAL MEDICINE

## 2017-03-25 PROCEDURE — 40000225 ZZH STATISTIC SLP WARD VISIT

## 2017-03-25 PROCEDURE — 40000239 ZZH STATISTIC VAT ROUNDS

## 2017-03-25 PROCEDURE — 99233 SBSQ HOSP IP/OBS HIGH 50: CPT | Performed by: INTERNAL MEDICINE

## 2017-03-25 PROCEDURE — 99207 ZZC MOONLIGHTING INDICATOR: CPT | Performed by: INTERNAL MEDICINE

## 2017-03-25 PROCEDURE — 83930 ASSAY OF BLOOD OSMOLALITY: CPT | Performed by: HOSPITALIST

## 2017-03-25 PROCEDURE — 40000916 ZZH STATISTIC SITTER, NIGHT HOURS

## 2017-03-25 PROCEDURE — 25000125 ZZHC RX 250: Performed by: INTERNAL MEDICINE

## 2017-03-25 PROCEDURE — 97112 NEUROMUSCULAR REEDUCATION: CPT | Mod: GP

## 2017-03-25 PROCEDURE — 80048 BASIC METABOLIC PNL TOTAL CA: CPT | Performed by: HOSPITALIST

## 2017-03-25 PROCEDURE — 00000146 ZZHCL STATISTIC GLUCOSE BY METER IP

## 2017-03-25 PROCEDURE — 97535 SELF CARE MNGMENT TRAINING: CPT | Mod: GO | Performed by: OCCUPATIONAL THERAPIST

## 2017-03-25 PROCEDURE — 25000128 H RX IP 250 OP 636: Performed by: RADIOLOGY

## 2017-03-25 PROCEDURE — 97530 THERAPEUTIC ACTIVITIES: CPT | Mod: GP

## 2017-03-25 PROCEDURE — 20000003 ZZH R&B ICU

## 2017-03-25 RX ORDER — MANNITOL 20 G/100ML
50 INJECTION, SOLUTION INTRAVENOUS EVERY 8 HOURS
Status: DISCONTINUED | OUTPATIENT
Start: 2017-03-25 | End: 2017-03-27

## 2017-03-25 RX ADMIN — Medication 5 MG/HR: at 14:59

## 2017-03-25 RX ADMIN — FAMOTIDINE 20 MG: 10 INJECTION, SOLUTION INTRAVENOUS at 08:14

## 2017-03-25 RX ADMIN — SODIUM CHLORIDE: 9 INJECTION, SOLUTION INTRAVENOUS at 08:56

## 2017-03-25 RX ADMIN — POTASSIUM CHLORIDE 20 MEQ: 29.8 INJECTION, SOLUTION INTRAVENOUS at 12:58

## 2017-03-25 RX ADMIN — Medication 5 MG/HR: at 08:56

## 2017-03-25 RX ADMIN — MANNITOL 50 G: 20 INJECTION, SOLUTION INTRAVENOUS at 12:55

## 2017-03-25 RX ADMIN — POTASSIUM PHOSPHATE, MONOBASIC AND POTASSIUM PHOSPHATE, DIBASIC 15 MMOL: 224; 236 INJECTION, SOLUTION INTRAVENOUS at 14:58

## 2017-03-25 RX ADMIN — POTASSIUM CHLORIDE 20 MEQ: 29.8 INJECTION, SOLUTION INTRAVENOUS at 14:06

## 2017-03-25 RX ADMIN — MANNITOL 50 G: 20 INJECTION, SOLUTION INTRAVENOUS at 04:09

## 2017-03-25 RX ADMIN — MANNITOL 50 G: 20 INJECTION, SOLUTION INTRAVENOUS at 20:20

## 2017-03-25 RX ADMIN — FAMOTIDINE 20 MG: 10 INJECTION, SOLUTION INTRAVENOUS at 20:19

## 2017-03-25 RX ADMIN — SODIUM CHLORIDE: 9 INJECTION, SOLUTION INTRAVENOUS at 21:17

## 2017-03-25 ASSESSMENT — VISUAL ACUITY
OU: BLINKS TO THREAT

## 2017-03-25 NOTE — PLAN OF CARE
Problem: Individualization  Goal: Patient Preferences  Outcome: No Change  Patient remains sleepy but arousable.  Follows commands with right side but out on LUE and withdraws on LLE.  Mannitol decreased from every 6 hours to every 8 hours.  CT of head in morning.  Nicardipine on throughout the day to keep SBP <140.  Nectar thick liquids with free water protocol.  Tolerating well but poor appetite.  Flat affect when awake and often has to be asked to open eyes.  Sat at edge of bed with PT and OT today (2 different times) with full support from 2 staff needed for sitting and staying upright.   and daughters at bedside throughout the day and updated.

## 2017-03-25 NOTE — PLAN OF CARE
Problem: Goal Outcome Summary  Goal: Goal Outcome Summary  PT: Pt appropriate to see per RN, pt sleeping on PT entry, rouses easily to voice and tactile cues at R UE. Pt completes supine to sit with ModAx2, MaxAx2 for sit to supine and repositioning, tolerated sitting EOB focusing on seated balance, requires ModAx1 +MaxAx1, pt with strong L lean/pushing with R UE and mild posterior leans at time (family reports fear of falling). Pt limited by decreased balance, L sided weakness and occasionally difficulty with command following. PT recommendation: ARU at discharge.

## 2017-03-25 NOTE — PLAN OF CARE
Problem: Goal Outcome Summary  Goal: Goal Outcome Summary  Outcome: Therapy, progress toward functional goals as expected  OT: pt was sleepy but woke to voice to participate in OT. Supine to sit with max A sitting balance was dependent, with lean to left that she had difficulty correcting, L neglect.  Family present through out session. Continue to recommend ARU at discharge

## 2017-03-25 NOTE — PROGRESS NOTES
"[facility]  Hospitalist Progress Note        Noel Griffin MD  03/25/2017        Interval History:      Patient  continues to be intermittently somnolent--arousable.          Assessment and Plan:        Marcia Kelley is a 76 year old female with no known significant medical hx except for possible HTN and is on a baby aspirin daily who presents with sudden onset left sided weakness and collapse.       Acute right MCA territory stroke Associated with sudden onset left sided weakness, facial droop, slurred speech, confusion and collapsed. CTA as below. S/p iv tPA and and IR thrombectomy was attempted but was unsuccessful due to the anatomy of her prox R CCA with tortuosity and a significant 360 degree loop.  S/p iv tPA.  Appreciate Neurology consultation.   - Keep SBP < 180 and DBP < 105. On IV  Nicardipine gtt   - Mannitol initiated--dose decreased today. Neurology managing fluids/nicardipine/ anticoag      Mechanical fall: On 3/24. Per notes, response called for fall.    - Imaging including CT head and neck as well as xrays negative.     Vitamin D deficiency: Returned low on 3/22.    - Vitamin D supplementation.       Possible HTN She denies any medical hx but her  reports a longstanding hx of HTN. She does not go to doctors as she thinks they are \"of no use\" according to her . BP mildly elevated her in the ER.    - Will monitor and treat as appropriate      DVT Prophylaxis: Pneumatic Compression Devices      Code: Full Code      Disposition: Pending clinical course. Continues on mannitol therapy in ICU at this time.                    Physical Exam:      Heart Rate: 79    Blood pressure 136/77, pulse 70, temperature 99  F (37.2  C), temperature source Oral, resp. rate 20, height 1.676 m (5' 6\"), weight 80.1 kg (176 lb 9.4 oz), SpO2 94 %.    Vitals:    03/21/17 1300 03/21/17 1649   Weight: 81.6 kg (179 lb 14.3 oz) 80.1 kg (176 lb 9.4 oz)       Vital Sign Ranges  Temperature Temp  Avg: " 98.5  F (36.9  C)  Min: 98.2  F (36.8  C)  Max: 98.9  F (37.2  C)   Blood pressure Systolic (24hrs), Av , Min:103 , Max:154        Diastolic (24hrs), Av, Min:58, Max:125      Pulse No Data Recorded   Respirations No Data Recorded   Pulse oximetry SpO2  Av.8 %  Min: 90 %  Max: 96 %     Vital Signs with Ranges  Temp:  [97.9  F (36.6  C)-99.4  F (37.4  C)] 99  F (37.2  C)  Heart Rate:  [61-87] 79  BP: (117-152)/(63-89) 136/77  SpO2:  [88 %-96 %] 94 %    I/O Last 3 Shifts:   I/O last 3 completed shifts:  In: 2993.33 [I.V.:2993.33]  Out: 4405 [Urine:4405]    I/O past 24 hours:     Intake/Output Summary (Last 24 hours) at 17 0727  Last data filed at 17 0600   Gross per 24 hour   Intake          3031.67 ml   Output             2720 ml   Net           311.67 ml     GENERAL: NAD. Less somnolent. Less lethargic.   HEENT: Normocephalic. Nares normal.   LUNGS: Clear to auscultation. No dyspnea at rest.   HEART: Regular rate. Extremities perfused.   ABDOMEN: Soft, nontender, and nondistended. Positive bowel sounds.   EXTREMITIES: No LE edema noted.   NEUROLOGIC: Weakness. Unable to follow commands.          Prior to Admission Medications:        Prescriptions Prior to Admission   Medication Sig Dispense Refill Last Dose     ASPIRIN PO Take 81 mg by mouth At Bedtime    3/20/2017 at             Medications:        Current Facility-Administered Medications   Medication Last Rate     - MEDICATION INSTRUCTIONS -       NaCl 75 mL/hr at 17 0856     - MEDICATION INSTRUCTIONS -       - MEDICATION INSTRUCTIONS -       niCARdipine 40 mg in 200 mL 0.9% NaCl 5 mg/hr (17 4379)     Current Facility-Administered Medications   Medication Dose Route Frequency     mannitol  50 g Intravenous Q8H     sodium chloride (PF)  10 mL Intracatheter Q8H     cholecalciferol  2,000 Units Oral Daily     co-enzyme Q-10  100 mg Oral Daily     insulin aspart  1-3 Units Subcutaneous TID AC     insulin aspart  1-3 Units  Subcutaneous At Bedtime     sodium chloride (PF)  10 mL Intravenous Once     sodium chloride (PF)  3 mL Intracatheter Q8H     famotidine  20 mg Intravenous BID     Current Facility-Administered Medications   Medication Dose Route Frequency     potassium phosphate (KPHOS) in D5W IV  15 mmol Intravenous Daily PRN     potassium phosphate (KPHOS) in D5W IV  20 mmol Intravenous Q6H PRN     potassium phosphate (KPHOS) in D5W IV  20 mmol Intravenous Q6H PRN     potassium phosphate (KPHOS) in D5W IV  25 mmol Intravenous Q8H PRN     lidocaine 4%   Topical Once PRN     heparin lock flush  2-5 mL Intracatheter Once PRN     sodium chloride (PF)  10-20 mL Intracatheter Q1H PRN     glucose  15-30 g Oral Q15 Min PRN    Or     dextrose  25-50 mL Intravenous Q15 Min PRN    Or     glucagon  1 mg Subcutaneous Q15 Min PRN     - MEDICATION INSTRUCTIONS -   Does not apply Continuous PRN     HOLD MEDICATION   Does not apply HOLD     metoclopramide  5 mg Oral Q6H PRN    Or     metoclopramide  5 mg Intravenous Q6H PRN     - MEDICATION INSTRUCTIONS -   Does not apply Continuous PRN     - MEDICATION INSTRUCTIONS -   Does not apply Continuous PRN     labetalol  10-20 mg Intravenous Q10 Min PRN     naloxone  0.1-0.4 mg Intravenous Q2 Min PRN     lidocaine  1 mL Other Q1H PRN     lidocaine 4%   Topical Q1H PRN     sodium chloride (PF)  3 mL Intracatheter Q1H PRN     potassium chloride  20-40 mEq Oral Q2H PRN     potassium chloride  20-40 mEq Oral or Feeding Tube Q2H PRN     potassium chloride  10 mEq Intravenous Q1H PRN     potassium chloride with lidocaine  10 mEq Intravenous Q1H PRN     potassium chloride  20 mEq Intravenous Q1H PRN     magnesium sulfate  4 g Intravenous Q4H PRN     acetaminophen  650 mg Rectal Q4H PRN     senna-docusate  1-2 tablet Oral BID PRN     magnesium hydroxide  30 mL Oral Daily PRN     bisacodyl  10 mg Rectal Daily PRN     ondansetron  4 mg Oral Q6H PRN    Or     ondansetron  4 mg Intravenous Q6H PRN      prochlorperazine  5 mg Intravenous Q6H PRN    Or     prochlorperazine  5 mg Oral Q6H PRN    Or     prochlorperazine  12.5 mg Rectal Q12H PRN     hydrALAZINE  10-20 mg Intravenous Q30 Min PRN            Data:      Lab data reviewed.     Recent Labs  Lab 03/25/17  1135 03/25/17  0457 03/25/17  0020  03/24/17  0320  03/23/17  0935 03/23/17  0555 03/22/17  0515 03/21/17  2215 03/21/17  1327   HGB  --  11.3*  --   --  12.2  --   --  11.9 12.2  --  13.7   MCV  --  95  --   --  97  --   --  96 95  --  96   PLT  --  107*  --   --  135*  --   --  121* 182  --  187   INR  --   --   --   --   --   --   --   --  0.98  --  0.95   NA  --  148*  --   --  148*  --   --  148* 146*  --  143   POTASSIUM 3.2* 3.4 3.4  < > 3.1*  < >  --  3.4 3.4  --  3.8   CHLORIDE  --  116*  --   --  115*  --   --  116* 113*  --  108   CO2  --  25  --   --  25  --   --  24 24  --  28   BUN  --  14  --   --  16  --   --  22 16  --  23   CR  --  0.59  --   --  0.77  --   --  0.70 0.67  --  0.80   ANIONGAP  --  7  --   --  8  --   --  8 9  --  7   RADHA  --  7.5*  --   --  7.8*  --   --  7.9* 8.0*  --  8.5   GLC  --  125*  --   --  111*  --   --  125* 174*  --  167*   TROPI  --   --   --   --   --   --  0.064*  --  0.031 <0.015  --    < > = values in this interval not displayed.        Imaging:      Imaging data reviewed.

## 2017-03-25 NOTE — PLAN OF CARE
Problem: Goal Outcome Summary  Goal: Goal Outcome Summary  SLP: Pt is rousable but requires ongoing verbal stimulation. Eyes closed at baseline and easily drifts off to sleep. Trial of ice chip given with no overt s/sx of aspiration, but initial spoonful of thins resulted in immediate coughing for 10 sec. Pt was encouraged to keep coughing in order to clear airway of suspected aspirated liquid. SLP cued pt to try to hold bolus in mouth until cued to swallow, and this was completed with thins 3 x and no subsequent cough. Purees x 2 Tb given with no difficulty and no oral residue. Reduced hyolaryngeal elevation noted with all swallows. Recommend: Continue nectar thick full liquids and initiate Free Water Protocol between meals, and only after excellent oral care. Up to 5 tsp of thin water in 1-2 hr period; discontinue if coughing increases or other concerns arise. Must be awake and alert, upright in bed. ST to follow to reassess diet tolerance.

## 2017-03-25 NOTE — PROGRESS NOTES
Called by nurse to look at CXR - appear left clavicular fracture on film - discussed with  apparently old injury. No instability on exam.  CXR ordered by hospitalist over change in resp pattern with some kussmaul type respirations.  Will check LA and VBG. Otherwise vitals reviewed and sats and exam stable. No resp distress or increased WOB    Giancarlo Gaines

## 2017-03-25 NOTE — PLAN OF CARE
Problem: Goal Outcome Summary  Goal: Goal Outcome Summary  Outcome: No Change  Patient is lethargic - oriented x 4 - calm and cooperative - has left facial droop and field cut - left sided hemiplegia and neglect - On Nicardipine to keep SBP below 140 - receiving Mannitol - having good urine output - replacing electrolytes - has periods of rapid/panting breathing - denies SOB, she says she has to breath like that - chest x-ray done - lungs are clear - getting a lactic acid and VBG -  worked with PT and OT - had swallow study - is able to have nectar thick liquids - discovered that the  and sister in law were giving the patient water from the faucet (not thickened) had a discussion with the family about silent aspiration and that it is very important that her liquids be thickened - family has been at the bedside all day getting updates - a sitter is available for when they leave tonight

## 2017-03-25 NOTE — PLAN OF CARE
Problem: Goal Outcome Summary  Goal: Goal Outcome Summary  Outcome: No Change  Pt remains unable to purposefully move LUE & LLE. Left facial droop present. AxO x4, able to make needs known. Tele SR c occasional PACs + PVCs. Nicardipine weaned off. Goal to keep SBP <140. No electrolyte replacement this shift. Good UOP per wilder. No c/o pain. Tolerating nectar thick liquids. Scattered bruising noted. Family at bedside until 11PM, many questions answered and family was given reassurance that pt would be monitored closely. Sitter then was at bedside for remainder of shift. Continue POC along with strong pt and family support.

## 2017-03-25 NOTE — PROGRESS NOTES
[facility]  Neuroscience and Spine [place]  Neuro-ICU Progress Note       Admission Date: 3/21/2017  Date of Service:03/25/2017   Hospital Day: 5   _________________________________     Main Plans for Today   --continue supportive care  --Decreased Mannitol to Q8h  --CT head in am  Assessment & Plan   #. (I63.411) Cerebral infarction due to embolism of right middle cerebral artery (H) (primary encounter diagnosis)  --Stroke consistent with embolic event  --Likely undiagnosed atrial fibrillation  -----Continue close monitoring for atrial fibrillation  --Hemorrhagic conversion will prevent anticoagulation at this time.   ---hold antiplatelets for 7 days  --A19 5.9  --TSH normal  #. (G93.6) Cerebral edema (H)  --Related to large R MCA stroke  --Increased on CT from 3/23/17  --continue Mannitol 50g Q8H  #. (I51.7) Atrial dilatation, left  --Likely the sign of atrial fibrillation  #. (I10) Benign essential hypertension  --Keep BP <180 mm Hg  --Management per hospitalists.   #. (E55.9) Vitamin D deficiency  --level 16  --Started vitamin D3 2000U and CoQ10  #. (E78.2) Mixed hyperlipidemia  --  --Not contributory to this stroke  --Will not start statins at this time.   #. DVT Prophylaxis  --Mechanical only due to hemorrhagic conversion  #. PT/OT/Speech  --Start evaluations   #. Nutrition / GI Prophylaxis  --Per recommendations of speech therapy      CODE STATUS: Full Code    Family update by me today: yes    Interval History   Patient presented with sudden onset left sided weakness and collapse. Was shopping and while in the dressing room developed left sided weakness, facial droop, slurred speech, confusion and collapsed to the ground. Staff heard her fall and called 911 after finding her on the ground. Code stroke was called in the ED. CT head with contrast showed moderate to large right posterior division middle cerebral artery territory matched defect consistent with an infarct. CT angiogram of  "head and neck showed a filling defect versus short segment high-grade stenosis involving the distal right M1 branch with poor filling of the right M2 branch. Finding is probably due to thromboembolus. IV tPA was administered and she went to IR for possible thrombectomy. 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 Subsequent MRI brain demonstrated large posterior right MCA infarct with small hemorrhagic transformation and mild midline shift due to cerebral edema.    No further falls. More awake. Follows commands and able to move left LE. No movement in the left LE. Denies headache.     Physical Exam       Vitals:  Vitals:    17 1300 17 1649   Weight: 81.6 kg (179 lb 14.3 oz) 80.1 kg (176 lb 9.4 oz)     Temp: 97.9  F (36.6  C) Temp src: Oral BP: 141/73   Heart Rate: 81   SpO2: 93 % O2 Device: None (Room air) Oxygen Delivery: 3 LPM Height: 167.6 cm (5' 6\") Weight: 80.1 kg (176 lb 9.4 oz)        Tmax: Temp (24hrs), Av.5  F (36.9  C), Min:97.9  F (36.6  C), Max:99.4  F (37.4  C)    BP - Mean:  [] 98   I&O:    Intake/Output Summary (Last 24 hours) at 17 0841  Last data filed at 17 0821   Gross per 24 hour   Intake          2919.58 ml   Output             4520 ml   Net         -1600.42 ml    I/O since admission: -785  Bowel movement: --       General Appearance:   No acute distress  Neuro:       Mental Status Exam:   Drowsy, arouses to voice, oriented X1. Minimal speech, dysarthric, no clear aphasia. Mental status is decreased       Cranial Nerves:  Pupils 3 mm, reactive. EOMI. Face sensation is normal. Left facial droop. Tongue and uvula are midline. Other CN are normal           Motor:  5/5 on the right, no movement LUE, 3/5 LLE. Tone and bulk are normal           Reflexes:  Normal DTR. Toes equivocal.        Sensory:  Left neglect                   Coordination:   Intact finger-to-nose on the right, unable to assess on the left due to " weakness       Gait:  Unable to assess  Cardiovascular: Regular rate and rhythm, no m/r/g  Lungs: Resp: 20  Both hemithoraces are symmetrical, auscultation of lungs revealed no bronchovesicular sounds, expirium prolongation, wheezing, rhonci and crackles  Abdomen: Soft, not tender, not distended  Skin/Extremities: No clubbing, cyanosis, no edema       Medications   Infusions medications:    - MEDICATION INSTRUCTIONS -       NaCl 75 mL/hr at 03/25/17 0808     - MEDICATION INSTRUCTIONS -       - MEDICATION INSTRUCTIONS -       niCARdipine 40 mg in 200 mL 0.9% NaCl 2.5 mg/hr (03/25/17 0754)     Schedule medications:    mannitol  50 g Intravenous Q8H     sodium chloride (PF)  10 mL Intracatheter Q8H     cholecalciferol  2,000 Units Oral Daily     co-enzyme Q-10  100 mg Oral Daily     insulin aspart  1-3 Units Subcutaneous TID AC     insulin aspart  1-3 Units Subcutaneous At Bedtime     sodium chloride (PF)  10 mL Intravenous Once     sodium chloride (PF)  3 mL Intracatheter Q8H     famotidine  20 mg Intravenous BID     PRN medications:potassium phosphate (KPHOS) in D5W IV, potassium phosphate (KPHOS) in D5W IV, potassium phosphate (KPHOS) in D5W IV, potassium phosphate (KPHOS) in D5W IV, lidocaine 4%, heparin lock flush, sodium chloride (PF), glucose **OR** dextrose **OR** glucagon, - MEDICATION INSTRUCTIONS -, HOLD MEDICATION, metoclopramide **OR** metoclopramide, - MEDICATION INSTRUCTIONS -, - MEDICATION INSTRUCTIONS -, labetalol, naloxone, lidocaine, lidocaine 4%, sodium chloride (PF), potassium chloride, potassium chloride, potassium chloride, potassium chloride with lidocaine, potassium chloride, magnesium sulfate, acetaminophen, senna-docusate, magnesium hydroxide, bisacodyl, ondansetron **OR** ondansetron, prochlorperazine **OR** prochlorperazine **OR** prochlorperazine, hydrALAZINE  Data       Labotary Data:   All data was reviewed by me personally  CBC RESULTS:  Recent Labs   Lab Test  03/25/17   8271   03/24/17   0320  03/23/17   0555  03/22/17   0515  03/21/17   1327   WBC  6.4  7.5  8.0  7.9  4.5   RBC  3.59*  3.84  3.73*  3.85  4.22   HGB  11.3*  12.2  11.9  12.2  13.7   HCT  34.1*  37.1  35.7  36.4  40.4   PLT  107*  135*  121*  182  187     Basic Metabolic Panel:  Recent Labs   Lab Test  03/25/17   0457  03/25/17   0020  03/24/17   1532  03/24/17   0902  03/24/17   0320  03/23/17   1530  03/23/17   0555  03/22/17   0515  03/21/17   1327   NA  148*   --    --    --   148*   --   148*  146*  143   POTASSIUM  3.4  3.4  2.9*  3.4  3.1*  3.2*  3.4  3.4  3.8   CHLORIDE  116*   --    --    --   115*   --   116*  113*  108   CO2  25   --    --    --   25   --   24  24  28   BUN  14   --    --    --   16   --   22  16  23   CR  0.59   --    --    --   0.77   --   0.70  0.67  0.80   GLC  125*   --    --    --   111*   --   125*  174*  167*   RADHA  7.5*   --    --    --   7.8*   --   7.9*  8.0*  8.5     Liver panel:  Recent Labs   Lab Test  03/22/17   0515   PROTTOTAL  5.9*   ALBUMIN  3.0*   BILITOTAL  0.4   ALKPHOS  67   AST  22   ALT  16     Coagulation  Recent Labs   Lab Test  03/22/17   0515  03/21/17   1327   INR  0.98  0.95   PTT   --   29      Lipid Profile:  Recent Labs   Lab Test  03/21/17   1327   CHOL  207*   HDL  73   LDL  108*   TRIG  130     Thyroid Panel:  Recent Labs   Lab Test  03/21/17   1327   TSH  1.89      Vitamin B12:   Recent Labs   Lab Test  03/21/17   1327   B12  323      Vitamin D:  Recent Labs   Lab Test  03/21/17   1327   VITDT  16*     A1C:   Recent Labs   Lab Test  03/22/17   0515  03/21/17   1327   A1C  5.7  5.9     Troponin I:   Recent Labs   Lab Test  03/23/17   0935  03/22/17   0515  03/21/17   2215   TROPI  0.064*  0.031  <0.015     Serum Osmolality:  Recent Labs   Lab Test  03/25/17   0304  03/24/17   2123  03/24/17   1532  03/24/17   0902  03/24/17   0320  03/23/17   2200  03/23/17   1530   OSMOSERUM  313*  312*  310*  314*  311*  306*  308*            Cardiac US:   TTE  3/23/17:  Above the ST ridge, the aorta is 3.7 cm. as is the arch. The ascending aorta is borderline dilated. Left ventricular systolic function is normal. The visual ejection fraction is estimated at 55%. The left ventricle is mildly dilated. The left atrium is moderately dilated and the right atrium is mildly dilated. A contrast injection (Bubble Study) was performed that was negative for flow across the interatrial septum. Right ventricular systolic pressure is elevated, consistent with mild to moderate pulmonary hypertension. There is trace to mild mitral regurgitation and trace to mild aortic regurgitation. Contrast was used without apparent complications. Would consider a transesophageal echocardiogram if clinically indicated. The study was technically limited.       Imaging:   All imaging studies were reviewed personally  CT head 3/21/17:   --Minimal chronic changes. No evidence for intracranial hemorrhage or any acute process    CTA neck/head 3/21/17:  --Filling defect versus short segment high-grade stenosis involving the distal right M1 and proximal M2 branch with poor filling of a right M2 branch. Finding is probably due to a thromboembolus    CT perfusion 3/21/17:  --Moderate to large right posterior division middle cerebral artery territory matched defect consistent with infarct    Cerebral angiogram 3/21/17:  1. Complete occlusion of the posterior division of the right middle cerebral artery with clot at the M2 origin. The anterior division is widely patent, as is the M1 segment.  2. 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 As such, the longest available neuron Max guide catheter was only able to be advanced to the proximal aspect of the 360 degree loop when completely hubbed at the skin. Mechanical thrombectomy was, therefore, not possible, and was not performed.  3. Minor plaque in the right carotid bulb, but no significant  right internal carotid origin stenosis by NASA criteria.    MRI brain 3/22/17:   1. Moderate to large acute ischemic right posterior division middle cerebral artery territory infarct with some petechial hemorrhage. Difficult to exclude macroscopic hemorrhage. If clinically indicated, CT without contrast might be helpful in further evaluation. There is some localized mass effect with some very minimal right-to-left midline shift.  2. Small old bilateral cerebellar infarcts. In addition, there is a questionable tiny acute ischemic infarct in the left cerebellum which could just be due to due to T2 shine through.    CT head 3/23/17:  --Evolving right middle cerebral artery distribution infarct. Small amount of petechial hemorrhage within the infarct is unchanged. Slight increase in the amount of midline shift.    CT head 3/24/17:  1. Evolving large area of infarction right hemisphere.  2. No hemorrhage.  3. Moderate mass effect as previously described with some midline shift to the left but no interval change.    CT cervical spine 3/24/17:  1. No fracture or acute abnormality.  2. Minimal degenerative change as described.    Time Spent on this Encounter      Time:   Neurocritical care time:  I spent 40 minutes bedside and on the inpatient unit today managing the neurocritical care of Marcia Kelley in relation to the issues listed in this note. Patient has very high risk of deterioration

## 2017-03-26 ENCOUNTER — APPOINTMENT (OUTPATIENT)
Dept: PHYSICAL THERAPY | Facility: CLINIC | Age: 77
DRG: 061 | End: 2017-03-26
Payer: MEDICARE

## 2017-03-26 ENCOUNTER — APPOINTMENT (OUTPATIENT)
Dept: CT IMAGING | Facility: CLINIC | Age: 77
DRG: 061 | End: 2017-03-26
Attending: PSYCHIATRY & NEUROLOGY
Payer: MEDICARE

## 2017-03-26 ENCOUNTER — APPOINTMENT (OUTPATIENT)
Dept: SPEECH THERAPY | Facility: CLINIC | Age: 77
DRG: 061 | End: 2017-03-26
Payer: MEDICARE

## 2017-03-26 ENCOUNTER — APPOINTMENT (OUTPATIENT)
Dept: OCCUPATIONAL THERAPY | Facility: CLINIC | Age: 77
DRG: 061 | End: 2017-03-26
Payer: MEDICARE

## 2017-03-26 LAB
ANION GAP SERPL CALCULATED.3IONS-SCNC: 8 MMOL/L (ref 3–14)
BUN SERPL-MCNC: 14 MG/DL (ref 7–30)
CALCIUM SERPL-MCNC: 7.6 MG/DL (ref 8.5–10.1)
CHLORIDE SERPL-SCNC: 113 MMOL/L (ref 94–109)
CO2 SERPL-SCNC: 26 MMOL/L (ref 20–32)
CREAT SERPL-MCNC: 0.58 MG/DL (ref 0.52–1.04)
GFR SERPL CREATININE-BSD FRML MDRD: ABNORMAL ML/MIN/1.7M2
GLUCOSE BLDC GLUCOMTR-MCNC: 101 MG/DL (ref 70–99)
GLUCOSE BLDC GLUCOMTR-MCNC: 119 MG/DL (ref 70–99)
GLUCOSE BLDC GLUCOMTR-MCNC: 79 MG/DL (ref 70–99)
GLUCOSE BLDC GLUCOMTR-MCNC: 96 MG/DL (ref 70–99)
GLUCOSE SERPL-MCNC: 93 MG/DL (ref 70–99)
OSMOLALITY SERPL: 301 MMOL/KG (ref 280–301)
OSMOLALITY SERPL: 302 MMOL/KG (ref 280–301)
OSMOLALITY SERPL: 302 MMOL/KG (ref 280–301)
POTASSIUM SERPL-SCNC: 3.3 MMOL/L (ref 3.4–5.3)
SODIUM SERPL-SCNC: 147 MMOL/L (ref 133–144)

## 2017-03-26 PROCEDURE — 99232 SBSQ HOSP IP/OBS MODERATE 35: CPT | Performed by: INTERNAL MEDICINE

## 2017-03-26 PROCEDURE — 40000225 ZZH STATISTIC SLP WARD VISIT

## 2017-03-26 PROCEDURE — 97110 THERAPEUTIC EXERCISES: CPT | Mod: GP

## 2017-03-26 PROCEDURE — 40000239 ZZH STATISTIC VAT ROUNDS

## 2017-03-26 PROCEDURE — 00000146 ZZHCL STATISTIC GLUCOSE BY METER IP

## 2017-03-26 PROCEDURE — 97112 NEUROMUSCULAR REEDUCATION: CPT | Mod: GO | Performed by: OCCUPATIONAL THERAPIST

## 2017-03-26 PROCEDURE — 70450 CT HEAD/BRAIN W/O DYE: CPT

## 2017-03-26 PROCEDURE — 40000133 ZZH STATISTIC OT WARD VISIT: Performed by: OCCUPATIONAL THERAPIST

## 2017-03-26 PROCEDURE — 99207 ZZC MOONLIGHTING INDICATOR: CPT | Performed by: INTERNAL MEDICINE

## 2017-03-26 PROCEDURE — 20000003 ZZH R&B ICU

## 2017-03-26 PROCEDURE — 40000193 ZZH STATISTIC PT WARD VISIT

## 2017-03-26 PROCEDURE — 25000128 H RX IP 250 OP 636: Performed by: PSYCHIATRY & NEUROLOGY

## 2017-03-26 PROCEDURE — S0028 INJECTION, FAMOTIDINE, 20 MG: HCPCS | Performed by: INTERNAL MEDICINE

## 2017-03-26 PROCEDURE — 25000125 ZZHC RX 250: Performed by: INTERNAL MEDICINE

## 2017-03-26 PROCEDURE — 40000916 ZZH STATISTIC SITTER, NIGHT HOURS

## 2017-03-26 PROCEDURE — 25000125 ZZHC RX 250: Performed by: PSYCHIATRY & NEUROLOGY

## 2017-03-26 PROCEDURE — 92526 ORAL FUNCTION THERAPY: CPT | Mod: GN

## 2017-03-26 PROCEDURE — 40000914 ZZH STATISTIC SITTER, DAY HOURS

## 2017-03-26 PROCEDURE — 25000128 H RX IP 250 OP 636: Performed by: RADIOLOGY

## 2017-03-26 PROCEDURE — 83930 ASSAY OF BLOOD OSMOLALITY: CPT | Performed by: PSYCHIATRY & NEUROLOGY

## 2017-03-26 PROCEDURE — 80048 BASIC METABOLIC PNL TOTAL CA: CPT | Performed by: PSYCHIATRY & NEUROLOGY

## 2017-03-26 RX ADMIN — POTASSIUM CHLORIDE 20 MEQ: 29.8 INJECTION, SOLUTION INTRAVENOUS at 06:20

## 2017-03-26 RX ADMIN — FAMOTIDINE 20 MG: 10 INJECTION, SOLUTION INTRAVENOUS at 21:14

## 2017-03-26 RX ADMIN — MANNITOL 50 G: 20 INJECTION, SOLUTION INTRAVENOUS at 04:57

## 2017-03-26 RX ADMIN — POTASSIUM CHLORIDE 20 MEQ: 29.8 INJECTION, SOLUTION INTRAVENOUS at 04:57

## 2017-03-26 RX ADMIN — SODIUM CHLORIDE: 9 INJECTION, SOLUTION INTRAVENOUS at 21:27

## 2017-03-26 RX ADMIN — FAMOTIDINE 20 MG: 10 INJECTION, SOLUTION INTRAVENOUS at 09:16

## 2017-03-26 RX ADMIN — MANNITOL 50 G: 20 INJECTION, SOLUTION INTRAVENOUS at 13:12

## 2017-03-26 RX ADMIN — Medication 5 MG/HR: at 03:09

## 2017-03-26 RX ADMIN — Medication 5 MG/HR: at 22:19

## 2017-03-26 RX ADMIN — Medication 5 MG/HR: at 13:54

## 2017-03-26 RX ADMIN — MANNITOL 50 G: 20 INJECTION, SOLUTION INTRAVENOUS at 21:14

## 2017-03-26 NOTE — PLAN OF CARE
Problem: Goal Outcome Summary  Goal: Goal Outcome Summary  Outcome: No Change  Pt remains unable to move left sided extremities and shows left facial droop. Pt is lethargic and withdrawn. Needs to be asked to open eyes when interacting. Able to make needs known. Tele SR w/ occasional PVCs. Tolerating nectar thick liquids. Nicardipine gtt infusing to keep SBP <140. Family at bedside throughout evening, many questions asked about plans going forward. Sitter at bedside throughout night. CT completed this AM. K replaced. Continue POC.

## 2017-03-26 NOTE — PROGRESS NOTES
"[facility]  Hospitalist Progress Note        Noel Griffin MD  03/26/2017        Interval History:      Patient  continues to be intermittently somnolent--arousable. Not much change today         Assessment and Plan:        Marcia Kelley is a 76 year old female with no known significant medical hx except for possible HTN and is on a baby aspirin daily who presents with sudden onset left sided weakness and collapse.       Acute right MCA territory stroke Associated with sudden onset left sided weakness, facial droop, slurred speech, confusion and collapsed. CTA as below. S/p iv tPA and and IR thrombectomy was attempted but was unsuccessful due to the anatomy of her prox R CCA with tortuosity and a significant 360 degree loop.  S/p iv tPA.  Appreciate Neurology consultation.   - Keep SBP < 180 and DBP < 105. On IV  Nicardipine gtt   - Mannitol initiated--dose decreased 3/25. Neurology managing fluids/nicardipine/ anticoag      Mechanical fall: On 3/24. Per notes, response called for fall.    - Imaging including CT head and neck as well as xrays negative.     Vitamin D deficiency: Returned low on 3/22.    - Vitamin D supplementation.       Possible HTN She denies any medical hx but her  reports a longstanding hx of HTN. She does not go to doctors as she thinks they are \"of no use\" according to her . BP mildly elevated her in the ER.    - Will monitor and treat as appropriate      DVT Prophylaxis: Pneumatic Compression Devices      Code: Full Code      Disposition: Pending clinical course. Continues on mannitol therapy in ICU at this time.                    Physical Exam:      Heart Rate: 67    Blood pressure 133/73, pulse 70, temperature 97.8  F (36.6  C), temperature source Oral, resp. rate 20, height 1.676 m (5' 6\"), weight 80.1 kg (176 lb 9.4 oz), SpO2 96 %.    Vitals:    03/21/17 1300 03/21/17 1649   Weight: 81.6 kg (179 lb 14.3 oz) 80.1 kg (176 lb 9.4 oz)       Vital Sign " Ranges  Temperature Temp  Av.5  F (36.9  C)  Min: 98.2  F (36.8  C)  Max: 98.9  F (37.2  C)   Blood pressure Systolic (24hrs), Av , Min:103 , Max:154        Diastolic (24hrs), Av, Min:58, Max:125      Pulse No Data Recorded   Respirations No Data Recorded   Pulse oximetry SpO2  Av.8 %  Min: 90 %  Max: 96 %     Vital Signs with Ranges  Temp:  [97.6  F (36.4  C)-98.7  F (37.1  C)] 97.8  F (36.6  C)  Heart Rate:  [58-85] 67  BP: (121-144)/(64-94) 133/73  SpO2:  [93 %-98 %] 96 %    I/O Last 3 Shifts:   I/O last 3 completed shifts:  In: 2880.42 [I.V.:2880.42]  Out: 3615 [Urine:3615]    I/O past 24 hours:     Intake/Output Summary (Last 24 hours) at 17 0727  Last data filed at 17 0600   Gross per 24 hour   Intake          3031.67 ml   Output             2720 ml   Net           311.67 ml     GENERAL: NAD.somnolent/ lethargic--arousable. .   HEENT: Normocephalic. Nares normal.   LUNGS: Clear to auscultation. No dyspnea at rest.   HEART: Regular rate. Extremities perfused.   ABDOMEN: Soft, nontender, and nondistended. Positive bowel sounds.   EXTREMITIES: No LE edema noted.   NEUROLOGIC: dense LUE paralysis. motal 4/5 RLE, 3/5 LLE, 4/5 RUE--L facial droop         Prior to Admission Medications:        Prescriptions Prior to Admission   Medication Sig Dispense Refill Last Dose     ASPIRIN PO Take 81 mg by mouth At Bedtime    3/20/2017 at hs            Medications:        Current Facility-Administered Medications   Medication Last Rate     - MEDICATION INSTRUCTIONS -       NaCl 75 mL/hr at 17 1400     - MEDICATION INSTRUCTIONS -       - MEDICATION INSTRUCTIONS -       niCARdipine 40 mg in 200 mL 0.9% NaCl 5 mg/hr (17 1400)     Current Facility-Administered Medications   Medication Dose Route Frequency     mannitol  50 g Intravenous Q8H     sodium chloride (PF)  10 mL Intracatheter Q8H     cholecalciferol  2,000 Units Oral Daily     co-enzyme Q-10  100 mg Oral Daily     insulin aspart   1-3 Units Subcutaneous TID AC     insulin aspart  1-3 Units Subcutaneous At Bedtime     sodium chloride (PF)  10 mL Intravenous Once     sodium chloride (PF)  3 mL Intracatheter Q8H     famotidine  20 mg Intravenous BID     Current Facility-Administered Medications   Medication Dose Route Frequency     potassium phosphate (KPHOS) in D5W IV  15 mmol Intravenous Daily PRN     potassium phosphate (KPHOS) in D5W IV  20 mmol Intravenous Q6H PRN     potassium phosphate (KPHOS) in D5W IV  20 mmol Intravenous Q6H PRN     potassium phosphate (KPHOS) in D5W IV  25 mmol Intravenous Q8H PRN     lidocaine 4%   Topical Once PRN     heparin lock flush  2-5 mL Intracatheter Once PRN     sodium chloride (PF)  10-20 mL Intracatheter Q1H PRN     glucose  15-30 g Oral Q15 Min PRN    Or     dextrose  25-50 mL Intravenous Q15 Min PRN    Or     glucagon  1 mg Subcutaneous Q15 Min PRN     - MEDICATION INSTRUCTIONS -   Does not apply Continuous PRN     HOLD MEDICATION   Does not apply HOLD     metoclopramide  5 mg Oral Q6H PRN    Or     metoclopramide  5 mg Intravenous Q6H PRN     - MEDICATION INSTRUCTIONS -   Does not apply Continuous PRN     - MEDICATION INSTRUCTIONS -   Does not apply Continuous PRN     labetalol  10-20 mg Intravenous Q10 Min PRN     naloxone  0.1-0.4 mg Intravenous Q2 Min PRN     lidocaine  1 mL Other Q1H PRN     lidocaine 4%   Topical Q1H PRN     sodium chloride (PF)  3 mL Intracatheter Q1H PRN     potassium chloride  20-40 mEq Oral Q2H PRN     potassium chloride  20-40 mEq Oral or Feeding Tube Q2H PRN     potassium chloride  10 mEq Intravenous Q1H PRN     potassium chloride with lidocaine  10 mEq Intravenous Q1H PRN     potassium chloride  20 mEq Intravenous Q1H PRN     magnesium sulfate  4 g Intravenous Q4H PRN     acetaminophen  650 mg Rectal Q4H PRN     senna-docusate  1-2 tablet Oral BID PRN     magnesium hydroxide  30 mL Oral Daily PRN     bisacodyl  10 mg Rectal Daily PRN     ondansetron  4 mg Oral Q6H PRN    Or      ondansetron  4 mg Intravenous Q6H PRN     prochlorperazine  5 mg Intravenous Q6H PRN    Or     prochlorperazine  5 mg Oral Q6H PRN    Or     prochlorperazine  12.5 mg Rectal Q12H PRN     hydrALAZINE  10-20 mg Intravenous Q30 Min PRN            Data:      Lab data reviewed.     Recent Labs  Lab 03/26/17  0353 03/25/17  1627 03/25/17  1135 03/25/17  0457  03/24/17  0320  03/23/17  0935 03/23/17  0555 03/22/17  0515 03/21/17  2215 03/21/17  1327   HGB  --   --   --  11.3*  --  12.2  --   --  11.9 12.2  --  13.7   MCV  --   --   --  95  --  97  --   --  96 95  --  96   PLT  --   --   --  107*  --  135*  --   --  121* 182  --  187   INR  --   --   --   --   --   --   --   --   --  0.98  --  0.95   *  --   --  148*  --  148*  --   --  148* 146*  --  143   POTASSIUM 3.3* 3.9 3.2* 3.4  < > 3.1*  < >  --  3.4 3.4  --  3.8   CHLORIDE 113*  --   --  116*  --  115*  --   --  116* 113*  --  108   CO2 26  --   --  25  --  25  --   --  24 24  --  28   BUN 14  --   --  14  --  16  --   --  22 16  --  23   CR 0.58  --   --  0.59  --  0.77  --   --  0.70 0.67  --  0.80   ANIONGAP 8  --   --  7  --  8  --   --  8 9  --  7   RADHA 7.6*  --   --  7.5*  --  7.8*  --   --  7.9* 8.0*  --  8.5   GLC 93  --   --  125*  --  111*  --   --  125* 174*  --  167*   TROPI  --   --   --   --   --   --   --  0.064*  --  0.031 <0.015  --    < > = values in this interval not displayed.        Imaging:      Imaging data reviewed.

## 2017-03-26 NOTE — PROGRESS NOTES
[facility]  [facility] [place]  Neuro-ICU Progress Note       Admission Date: 3/21/2017  Date of Service:03/26/2017   Hospital Day: 6   _________________________________     Main Plans for Today   --continue supportive care  --continue  Mannitol to Q8H  Assessment & Plan   #. (I63.411) Cerebral infarction due to embolism of right middle cerebral artery (H) (primary encounter diagnosis)  --Stroke consistent with embolic event  --Likely undiagnosed atrial fibrillation  -----Continue close monitoring for atrial fibrillation  --Hemorrhagic conversion will prevent anticoagulation at this time.   ---hold antiplatelets for 7 days  --A19 5.9  --TSH normal  #. (G93.6) Cerebral edema (H)  --Related to large R MCA stroke  --Increased on CT from 3/26/17  --continue Mannitol 50g Q8H  #. (I51.7) Atrial dilatation, left  --Likely the sign of atrial fibrillation  #. (I10) Benign essential hypertension  --Keep BP <180 mm Hg  --Management per hospitalists.   #. (E55.9) Vitamin D deficiency  --level 16  --Started vitamin D3 2000U and CoQ10  #. (E78.2) Mixed hyperlipidemia  --  --Not contributory to this stroke  --Will not start statins at this time.   #. DVT Prophylaxis  --Mechanical only due to hemorrhagic conversion  #. PT/OT/Speech  --Start evaluations   #. Nutrition / GI Prophylaxis  --Per recommendations of speech therapy      CODE STATUS: Full Code    Family update by me today: yes    Interval History   Patient presented with sudden onset left sided weakness and collapse. Was shopping and while in the dressing room developed left sided weakness, facial droop, slurred speech, confusion and collapsed to the ground. Staff heard her fall and called 911 after finding her on the ground. Code stroke was called in the ED. CT head with contrast showed moderate to large right posterior division middle cerebral artery territory matched defect consistent with an infarct. CT angiogram of head and neck  "showed a filling defect versus short segment high-grade stenosis involving the distal right M1 branch with poor filling of the right M2 branch. Finding is probably due to thromboembolus. IV tPA was administered and she went to IR for possible thrombectomy. 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 Subsequent MRI brain demonstrated large posterior right MCA infarct with small hemorrhagic transformation and mild midline shift due to cerebral edema.    Slightly more awake. No new issues    Physical Exam       Vitals:  Vitals:    17 1300 17 1649   Weight: 81.6 kg (179 lb 14.3 oz) 80.1 kg (176 lb 9.4 oz)     Temp: 97.8  F (36.6  C) Temp src: Oral BP: 136/78   Heart Rate: 74   SpO2: 96 % O2 Device: Nasal cannula Oxygen Delivery: 2 LPM Height: 167.6 cm (5' 6\") Weight: 80.1 kg (176 lb 9.4 oz)        Tmax: Temp (24hrs), Av.2  F (36.8  C), Min:97.6  F (36.4  C), Max:99  F (37.2  C)    BP - Mean:  [] 107   I&O:    Intake/Output Summary (Last 24 hours) at 17 0742  Last data filed at 17 0615   Gross per 24 hour   Intake          2880.42 ml   Output             3615 ml   Net          -734.58 ml    I/O since admission: -1119.58  Bowel movement: --         General Appearance:   No acute distress  Neuro:       Mental Status Exam:   Drowsy, arouses to voice, oriented X1. Minimal speech, dysarthric, no clear aphasia. Mental status is decreased       Cranial Nerves:  Pupils 3 mm, reactive. EOMI. Face sensation is normal. Left facial droop. Tongue and uvula are midline. Other CN are normal           Motor:  5/5 on the right, no movement LUE, 3/5 LLE. Tone and bulk are normal        Reflexes:  Normal DTR. Toes equivocal.        Sensory:  Left neglect                   Coordination:   Intact finger-to-nose on the right, unable to assess on the left due to weakness       Gait:  Unable to assess  Cardiovascular: Regular rate and rhythm, no m/r/g  Lungs: No " Data Recorded  Both hemithoraces are symmetrical, auscultation of lungs revealed no bronchovesicular sounds, expirium prolongation, wheezing, rhonci and crackles  Abdomen: Soft, not tender, not distended  Skin/Extremities: No clubbing, cyanosis, no edema       Medications   Infusions medications:    - MEDICATION INSTRUCTIONS -       NaCl 75 mL/hr at 03/25/17 2117     - MEDICATION INSTRUCTIONS -       - MEDICATION INSTRUCTIONS -       niCARdipine 40 mg in 200 mL 0.9% NaCl 2.5 mg/hr (03/26/17 0347)     Schedule medications:    mannitol  50 g Intravenous Q8H     sodium chloride (PF)  10 mL Intracatheter Q8H     cholecalciferol  2,000 Units Oral Daily     co-enzyme Q-10  100 mg Oral Daily     insulin aspart  1-3 Units Subcutaneous TID AC     insulin aspart  1-3 Units Subcutaneous At Bedtime     sodium chloride (PF)  10 mL Intravenous Once     sodium chloride (PF)  3 mL Intracatheter Q8H     famotidine  20 mg Intravenous BID     PRN medications:potassium phosphate (KPHOS) in D5W IV, potassium phosphate (KPHOS) in D5W IV, potassium phosphate (KPHOS) in D5W IV, potassium phosphate (KPHOS) in D5W IV, lidocaine 4%, heparin lock flush, sodium chloride (PF), glucose **OR** dextrose **OR** glucagon, - MEDICATION INSTRUCTIONS -, HOLD MEDICATION, metoclopramide **OR** metoclopramide, - MEDICATION INSTRUCTIONS -, - MEDICATION INSTRUCTIONS -, labetalol, naloxone, lidocaine, lidocaine 4%, sodium chloride (PF), potassium chloride, potassium chloride, potassium chloride, potassium chloride with lidocaine, potassium chloride, magnesium sulfate, acetaminophen, senna-docusate, magnesium hydroxide, bisacodyl, ondansetron **OR** ondansetron, prochlorperazine **OR** prochlorperazine **OR** prochlorperazine, hydrALAZINE  Data       Labotary Data:   All data was reviewed by me personally  CBC RESULTS:  Recent Labs   Lab Test  03/25/17   0457  03/24/17   0320  03/23/17   0555  03/22/17   0515  03/21/17   1327   WBC  6.4  7.5  8.0  7.9  4.5    RBC  3.59*  3.84  3.73*  3.85  4.22   HGB  11.3*  12.2  11.9  12.2  13.7   HCT  34.1*  37.1  35.7  36.4  40.4   PLT  107*  135*  121*  182  187     Basic Metabolic Panel:  Recent Labs   Lab Test  03/26/17   0353  03/25/17   1627  03/25/17   1135  03/25/17   0457  03/25/17   0020  03/24/17   1532   03/24/17   0320   03/23/17   0555  03/22/17   0515  03/21/17   1327   NA  147*   --    --   148*   --    --    --   148*   --   148*  146*  143   POTASSIUM  3.3*  3.9  3.2*  3.4  3.4  2.9*   < >  3.1*   < >  3.4  3.4  3.8   CHLORIDE  113*   --    --   116*   --    --    --   115*   --   116*  113*  108   CO2  26   --    --   25   --    --    --   25   --   24  24  28   BUN  14   --    --   14   --    --    --   16   --   22  16  23   CR  0.58   --    --   0.59   --    --    --   0.77   --   0.70  0.67  0.80   GLC  93   --    --   125*   --    --    --   111*   --   125*  174*  167*   RADHA  7.6*   --    --   7.5*   --    --    --   7.8*   --   7.9*  8.0*  8.5    < > = values in this interval not displayed.     Liver panel:  Recent Labs   Lab Test  03/22/17 0515   PROTTOTAL  5.9*   ALBUMIN  3.0*   BILITOTAL  0.4   ALKPHOS  67   AST  22   ALT  16     Coagulation  Recent Labs   Lab Test  03/22/17   0515  03/21/17   1327   INR  0.98  0.95   PTT   --   29      Lipid Profile:  Recent Labs   Lab Test  03/21/17 1327   CHOL  207*   HDL  73   LDL  108*   TRIG  130     Thyroid Panel:  Recent Labs   Lab Test  03/21/17   1327   TSH  1.89      Vitamin B12:   Recent Labs   Lab Test  03/21/17   1327   B12  323      Vitamin D:  Recent Labs   Lab Test  03/21/17   1327   VITDT  16*     A1C:   Recent Labs   Lab Test  03/22/17   0515  03/21/17   1327   A1C  5.7  5.9     Troponin I:   Recent Labs   Lab Test  03/23/17   0935  03/22/17   0515  03/21/17   2215   TROPI  0.064*  0.031  <0.015     Serum Osmolality:  Recent Labs   Lab Test  03/26/17   0353  03/25/17   1932  03/25/17   1135  03/25/17   0304  03/24/17   2123  03/24/17   1532   03/24/17   0902   MARYUM  302*  306*  310*  313*  312*  310*  314*            Cardiac US:   TTE 3/23/17:  Above the ST ridge, the aorta is 3.7 cm. as is the arch. The ascending aorta is borderline dilated. Left ventricular systolic function is normal. The visual ejection fraction is estimated at 55%. The left ventricle is mildly dilated. The left atrium is moderately dilated and the right atrium is mildly dilated. A contrast injection (Bubble Study) was performed that was negative for flow across the interatrial septum. Right ventricular systolic pressure is elevated, consistent with mild to moderate pulmonary hypertension. There is trace to mild mitral regurgitation and trace to mild aortic regurgitation. Contrast was used without apparent complications. Would consider a transesophageal echocardiogram if clinically indicated. The study was technically limited.       Imaging:   All imaging studies were reviewed personally  CT head 3/21/17:   --Minimal chronic changes. No evidence for intracranial hemorrhage or any acute process    CTA neck/head 3/21/17:  --Filling defect versus short segment high-grade stenosis involving the distal right M1 and proximal M2 branch with poor filling of a right M2 branch. Finding is probably due to a thromboembolus    CT perfusion 3/21/17:  --Moderate to large right posterior division middle cerebral artery territory matched defect consistent with infarct    Cerebral angiogram 3/21/17:  1. Complete occlusion of the posterior division of the right middle cerebral artery with clot at the M2 origin. The anterior division is widely patent, as is the M1 segment.  2. 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 As such, the longest available neuron Max guide catheter was only able to be advanced to the proximal aspect of the 360 degree loop when completely hubbed at the skin. Mechanical thrombectomy was, therefore,  not possible, and was not performed.  3. Minor plaque in the right carotid bulb, but no significant right internal carotid origin stenosis by NASA criteria.    MRI brain 3/22/17:   1. Moderate to large acute ischemic right posterior division middle cerebral artery territory infarct with some petechial hemorrhage. Difficult to exclude macroscopic hemorrhage. If clinically indicated, CT without contrast might be helpful in further evaluation. There is some localized mass effect with some very minimal right-to-left midline shift.  2. Small old bilateral cerebellar infarcts. In addition, there is a questionable tiny acute ischemic infarct in the left cerebellum which could just be due to due to T2 shine through.    CT head 3/23/17:  --Evolving right middle cerebral artery distribution infarct. Small amount of petechial hemorrhage within the infarct is unchanged. Slight increase in the amount of midline shift.    CT head 3/24/17:  1. Evolving large area of infarction right hemisphere.  2. No hemorrhage.  3. Moderate mass effect as previously described with some midline shift to the left but no interval change.    CT cervical spine 3/24/17:  1. No fracture or acute abnormality.  2. Minimal degenerative change as described.    CT head 3/26/17  Evolving ischemic right middle cerebral artery territory infarct with 9 mm of right-to-left shift and minimal prominence of the temporal horn. The mass effect is minimally more prominent than the prior study.    Time Spent on this Encounter      Time:   Neurocritical care time:  I spent 40 minutes bedside and on the inpatient unit today managing the neurocritical care of Marcia Kelley in relation to the issues listed in this note. Patient has very high risk of deterioration

## 2017-03-26 NOTE — PROGRESS NOTES
VSS, on nicardipine drip tp keep sbp <140. Her diet has changed from full liquid to DD1 w thin liquids. Pt does not have an appetite. No neuro changes. Family present in the room thru the shift. Monnitol q8h.

## 2017-03-26 NOTE — PLAN OF CARE
Problem: Goal Outcome Summary  Goal: Goal Outcome Summary  SLP: Pt appears more alert and awake on this date. She has been tolerating nectars and Free Water Protocol well, per RN notes. Trials of thins by cup given, with pt holding cup and taking 2-4 sips sequentially. Adequate timing of onset and degree of elevation, of hyolaryngeal rise. No overt s/sx of aspiration noted and no anterior spillage. This was replicated when pt did the same with sequential swallows following 3 bites of purees. No difficulty or oral residue with purees. Mildly increased duration of oral phase, and pt does require ongoing cues to keep eyes open.  REC: advance to Dysphagia Diet 1 and thins, NO STRAWS. Encourage small sips and bites, must be sitting upright for all PO intake. Crush meds in purees and ST to follow for tolerance and education.

## 2017-03-26 NOTE — PLAN OF CARE
Problem: Goal Outcome Summary  Goal: Goal Outcome Summary  Outcome: Therapy, progress toward functional goals as expected  OT: more alert today at start of session, but fatigued by the end. Some active shoulder elevation noted on L side when cued. Continue to recommend ARU at discharge

## 2017-03-26 NOTE — PLAN OF CARE
Problem: Goal Outcome Summary  Goal: Goal Outcome Summary  PT: Pt with fair alertness, but increased fatigue during session compared to previous, despite consistent cues pt unable to keep eyes open, pt following ~50% of cues for LE mobility and rolling in bed. Participated in supine LE ex/ROM, pt resistive to rolling/changing position in bed, dependent for supine to L supported sidelying. Bed alarm on and needs in reach on PT exit, daughter present throughout and RN assisted with mobility. Continue to recommend ARU at discharge

## 2017-03-27 ENCOUNTER — APPOINTMENT (OUTPATIENT)
Dept: OCCUPATIONAL THERAPY | Facility: CLINIC | Age: 77
DRG: 061 | End: 2017-03-27
Payer: MEDICARE

## 2017-03-27 ENCOUNTER — APPOINTMENT (OUTPATIENT)
Dept: SPEECH THERAPY | Facility: CLINIC | Age: 77
DRG: 061 | End: 2017-03-27
Payer: MEDICARE

## 2017-03-27 LAB
BUN SERPL-MCNC: 15 MG/DL (ref 7–30)
CALCIUM SERPL-MCNC: 7.9 MG/DL (ref 8.5–10.1)
CHLORIDE SERPL-SCNC: 112 MMOL/L (ref 94–109)
CO2 SERPL-SCNC: 24 MMOL/L (ref 20–32)
CREAT SERPL-MCNC: 0.56 MG/DL (ref 0.52–1.04)
GFR SERPL CREATININE-BSD FRML MDRD: NORMAL ML/MIN/1.7M2
GLUCOSE BLDC GLUCOMTR-MCNC: 107 MG/DL (ref 70–99)
GLUCOSE BLDC GLUCOMTR-MCNC: 112 MG/DL (ref 70–99)
GLUCOSE BLDC GLUCOMTR-MCNC: 135 MG/DL (ref 70–99)
GLUCOSE BLDC GLUCOMTR-MCNC: 138 MG/DL (ref 70–99)
GLUCOSE SERPL-MCNC: 93 MG/DL (ref 70–99)
MAGNESIUM SERPL-MCNC: 2.2 MG/DL (ref 1.6–2.3)
OSMOLALITY SERPL: 301 MMOL/KG (ref 280–301)
OSMOLALITY SERPL: 302 MMOL/KG (ref 280–301)
PHOSPHATE SERPL-MCNC: 2.9 MG/DL (ref 2.5–4.5)
POTASSIUM SERPL-SCNC: 3.1 MMOL/L (ref 3.4–5.3)
POTASSIUM SERPL-SCNC: 3.4 MMOL/L (ref 3.4–5.3)
SODIUM SERPL-SCNC: 146 MMOL/L (ref 133–144)

## 2017-03-27 PROCEDURE — A9270 NON-COVERED ITEM OR SERVICE: HCPCS | Mod: GY | Performed by: PSYCHIATRY & NEUROLOGY

## 2017-03-27 PROCEDURE — 84100 ASSAY OF PHOSPHORUS: CPT | Performed by: PSYCHIATRY & NEUROLOGY

## 2017-03-27 PROCEDURE — 99232 SBSQ HOSP IP/OBS MODERATE 35: CPT | Performed by: HOSPITALIST

## 2017-03-27 PROCEDURE — 40000239 ZZH STATISTIC VAT ROUNDS

## 2017-03-27 PROCEDURE — 83735 ASSAY OF MAGNESIUM: CPT | Performed by: PSYCHIATRY & NEUROLOGY

## 2017-03-27 PROCEDURE — 83930 ASSAY OF BLOOD OSMOLALITY: CPT | Performed by: NURSE PRACTITIONER

## 2017-03-27 PROCEDURE — 20000003 ZZH R&B ICU

## 2017-03-27 PROCEDURE — 80048 BASIC METABOLIC PNL TOTAL CA: CPT | Performed by: PSYCHIATRY & NEUROLOGY

## 2017-03-27 PROCEDURE — 25000128 H RX IP 250 OP 636: Performed by: PSYCHIATRY & NEUROLOGY

## 2017-03-27 PROCEDURE — 97535 SELF CARE MNGMENT TRAINING: CPT | Mod: GO

## 2017-03-27 PROCEDURE — 40000225 ZZH STATISTIC SLP WARD VISIT: Performed by: SPEECH-LANGUAGE PATHOLOGIST

## 2017-03-27 PROCEDURE — 92526 ORAL FUNCTION THERAPY: CPT | Mod: GN | Performed by: SPEECH-LANGUAGE PATHOLOGIST

## 2017-03-27 PROCEDURE — S0028 INJECTION, FAMOTIDINE, 20 MG: HCPCS | Performed by: INTERNAL MEDICINE

## 2017-03-27 PROCEDURE — 83930 ASSAY OF BLOOD OSMOLALITY: CPT | Performed by: PSYCHIATRY & NEUROLOGY

## 2017-03-27 PROCEDURE — 25000125 ZZHC RX 250: Performed by: PSYCHIATRY & NEUROLOGY

## 2017-03-27 PROCEDURE — 25000132 ZZH RX MED GY IP 250 OP 250 PS 637: Mod: GY | Performed by: PSYCHIATRY & NEUROLOGY

## 2017-03-27 PROCEDURE — 40000914 ZZH STATISTIC SITTER, DAY HOURS

## 2017-03-27 PROCEDURE — 25000125 ZZHC RX 250: Performed by: INTERNAL MEDICINE

## 2017-03-27 PROCEDURE — 84132 ASSAY OF SERUM POTASSIUM: CPT | Performed by: INTERNAL MEDICINE

## 2017-03-27 PROCEDURE — 00000146 ZZHCL STATISTIC GLUCOSE BY METER IP

## 2017-03-27 PROCEDURE — 25000128 H RX IP 250 OP 636: Performed by: NURSE PRACTITIONER

## 2017-03-27 PROCEDURE — 40000133 ZZH STATISTIC OT WARD VISIT

## 2017-03-27 PROCEDURE — 97110 THERAPEUTIC EXERCISES: CPT | Mod: GO

## 2017-03-27 PROCEDURE — 40000915 ZZH STATISTIC SITTER, EVENING HOURS

## 2017-03-27 PROCEDURE — 25000128 H RX IP 250 OP 636: Performed by: RADIOLOGY

## 2017-03-27 RX ORDER — MANNITOL 20 G/100ML
50 INJECTION, SOLUTION INTRAVENOUS EVERY 12 HOURS
Status: COMPLETED | OUTPATIENT
Start: 2017-03-27 | End: 2017-03-28

## 2017-03-27 RX ADMIN — HYDRALAZINE HYDROCHLORIDE 10 MG: 20 INJECTION INTRAMUSCULAR; INTRAVENOUS at 22:56

## 2017-03-27 RX ADMIN — FAMOTIDINE 20 MG: 10 INJECTION, SOLUTION INTRAVENOUS at 22:06

## 2017-03-27 RX ADMIN — SODIUM CHLORIDE: 9 INJECTION, SOLUTION INTRAVENOUS at 11:09

## 2017-03-27 RX ADMIN — MANNITOL 50 G: 20 INJECTION, SOLUTION INTRAVENOUS at 04:31

## 2017-03-27 RX ADMIN — FAMOTIDINE 20 MG: 10 INJECTION, SOLUTION INTRAVENOUS at 08:22

## 2017-03-27 RX ADMIN — Medication 2.5 MG/HR: at 08:06

## 2017-03-27 RX ADMIN — POTASSIUM CHLORIDE 20 MEQ: 29.8 INJECTION, SOLUTION INTRAVENOUS at 05:38

## 2017-03-27 RX ADMIN — VITAMIN D, TAB 1000IU (100/BT) 2000 UNITS: 25 TAB at 08:18

## 2017-03-27 RX ADMIN — POTASSIUM CHLORIDE 20 MEQ: 29.8 INJECTION, SOLUTION INTRAVENOUS at 04:29

## 2017-03-27 RX ADMIN — HYDRALAZINE HYDROCHLORIDE 10 MG: 20 INJECTION INTRAMUSCULAR; INTRAVENOUS at 12:19

## 2017-03-27 RX ADMIN — Medication 2.5 MG/HR: at 16:46

## 2017-03-27 RX ADMIN — Medication 5 MG/HR: at 04:37

## 2017-03-27 RX ADMIN — MANNITOL 50 G: 20 INJECTION, SOLUTION INTRAVENOUS at 17:03

## 2017-03-27 NOTE — PROGRESS NOTES
"[facility]  Hospitalist Progress Note        Dipak Lucero, DO  03/27/2017        Interval History:      Patient transferring to chair this am.          Assessment and Plan:        Marcia Kelley is a 76 year old female with no known significant medical hx except for possible HTN and is on a baby aspirin daily who presents with sudden onset left sided weakness and collapse.       Acute right MCA territory stroke Associated with sudden onset left sided weakness, facial droop, slurred speech, confusion and collapsed. CTA as below. S/p iv tPA and and IR thrombectomy was attempted but was unsuccessful due to the anatomy of her prox R CCA with tortuosity and a significant 360 degree loop. S/p iv tPA. Appreciate Neurology consultation.   - Keep SBP < 180 and DBP < 105. On IV Nicardipine gtt   - Mannitol continued.    - Neurology managing fluids/nicardipine/ anticoag      Mechanical fall: On 3/24. Per notes, response called for fall.    - Imaging including CT head and neck as well as xrays negative.      Vitamin D deficiency: Returned low on 3/22.    - Vitamin D supplementation.       Possible HTN She denies any medical hx but her  reports a longstanding hx of HTN. She does not go to doctors as she thinks they are \"of no use\" according to her . BP mildly elevated her in the ER.    - Will monitor and treat as appropriate      DVT Prophylaxis: Pneumatic Compression Devices      Code: Full Code      Disposition: Pending clinical course. Continues on mannitol therapy in ICU at this time.                    Physical Exam:      Heart Rate: 57    Blood pressure 121/65, pulse 70, temperature 97.4  F (36.3  C), temperature source Oral, resp. rate 20, height 1.676 m (5' 6\"), weight 78.6 kg (173 lb 4.5 oz), SpO2 94 %.    Vitals:    03/21/17 1300 03/21/17 1649 03/27/17 0600   Weight: 81.6 kg (179 lb 14.3 oz) 80.1 kg (176 lb 9.4 oz) 78.6 kg (173 lb 4.5 oz)       Vital Sign Ranges  Temperature Temp "  Av.8  F (36.6  C)  Min: 97.4  F (36.3  C)  Max: 98.3  F (36.8  C)   Blood pressure Systolic (24hrs), Av , Min:117 , Max:139        Diastolic (24hrs), Av, Min:63, Max:94      Pulse No Data Recorded   Respirations No Data Recorded   Pulse oximetry SpO2  Av.1 %  Min: 92 %  Max: 98 %     Vital Signs with Ranges  Temp:  [97.4  F (36.3  C)-98.3  F (36.8  C)] 97.4  F (36.3  C)  Heart Rate:  [57-73] 57  BP: (117-139)/(63-94) 121/65  SpO2:  [92 %-98 %] 94 %    I/O Last 3 Shifts:   I/O last 3 completed shifts:  In: 1619.38 [I.V.:1619.38]  Out: 3620 [Urine:3620]    I/O past 24 hours:     Intake/Output Summary (Last 24 hours) at 17  Last data filed at 17 0600   Gross per 24 hour   Intake          1544.38 ml   Output             3295 ml   Net         -1750.62 ml     GENERAL: NAD. somnolent/lethargic  HEENT: Normocephalic. Nares normal.   LUNGS: Clear to auscultation. No dyspnea at rest.   HEART: Regular rate. Extremities perfused.   ABDOMEN: Soft, nontender, and nondistended. Positive bowel sounds.   EXTREMITIES: No LE edema noted.   NEUROLOGIC: dense LUE paralysis.          Prior to Admission Medications:        Prescriptions Prior to Admission   Medication Sig Dispense Refill Last Dose     ASPIRIN PO Take 81 mg by mouth At Bedtime    3/20/2017 at             Medications:        Current Facility-Administered Medications   Medication Last Rate     - MEDICATION INSTRUCTIONS -       NaCl 75 mL/hr at 17     - MEDICATION INSTRUCTIONS -       - MEDICATION INSTRUCTIONS -       niCARdipine 40 mg in 200 mL 0.9% NaCl 2.5 mg/hr (17)     Current Facility-Administered Medications   Medication Dose Route Frequency     mannitol  50 g Intravenous Q8H     sodium chloride (PF)  10 mL Intracatheter Q8H     cholecalciferol  2,000 Units Oral Daily     co-enzyme Q-10  100 mg Oral Daily     insulin aspart  1-3 Units Subcutaneous TID AC     insulin aspart  1-3 Units Subcutaneous At Bedtime      sodium chloride (PF)  10 mL Intravenous Once     sodium chloride (PF)  3 mL Intracatheter Q8H     famotidine  20 mg Intravenous BID     Current Facility-Administered Medications   Medication Dose Route Frequency     potassium phosphate (KPHOS) in D5W IV  15 mmol Intravenous Daily PRN     potassium phosphate (KPHOS) in D5W IV  20 mmol Intravenous Q6H PRN     potassium phosphate (KPHOS) in D5W IV  20 mmol Intravenous Q6H PRN     potassium phosphate (KPHOS) in D5W IV  25 mmol Intravenous Q8H PRN     lidocaine 4%   Topical Once PRN     heparin lock flush  2-5 mL Intracatheter Once PRN     sodium chloride (PF)  10-20 mL Intracatheter Q1H PRN     glucose  15-30 g Oral Q15 Min PRN    Or     dextrose  25-50 mL Intravenous Q15 Min PRN    Or     glucagon  1 mg Subcutaneous Q15 Min PRN     - MEDICATION INSTRUCTIONS -   Does not apply Continuous PRN     HOLD MEDICATION   Does not apply HOLD     metoclopramide  5 mg Oral Q6H PRN    Or     metoclopramide  5 mg Intravenous Q6H PRN     - MEDICATION INSTRUCTIONS -   Does not apply Continuous PRN     - MEDICATION INSTRUCTIONS -   Does not apply Continuous PRN     labetalol  10-20 mg Intravenous Q10 Min PRN     naloxone  0.1-0.4 mg Intravenous Q2 Min PRN     lidocaine  1 mL Other Q1H PRN     lidocaine 4%   Topical Q1H PRN     sodium chloride (PF)  3 mL Intracatheter Q1H PRN     potassium chloride  20-40 mEq Oral Q2H PRN     potassium chloride  20-40 mEq Oral or Feeding Tube Q2H PRN     potassium chloride  10 mEq Intravenous Q1H PRN     potassium chloride with lidocaine  10 mEq Intravenous Q1H PRN     potassium chloride  20 mEq Intravenous Q1H PRN     magnesium sulfate  4 g Intravenous Q4H PRN     acetaminophen  650 mg Rectal Q4H PRN     senna-docusate  1-2 tablet Oral BID PRN     magnesium hydroxide  30 mL Oral Daily PRN     bisacodyl  10 mg Rectal Daily PRN     ondansetron  4 mg Oral Q6H PRN    Or     ondansetron  4 mg Intravenous Q6H PRN     prochlorperazine  5 mg Intravenous  Q6H PRN    Or     prochlorperazine  5 mg Oral Q6H PRN    Or     prochlorperazine  12.5 mg Rectal Q12H PRN     hydrALAZINE  10-20 mg Intravenous Q30 Min PRN            Data:      Lab data reviewed.     Recent Labs  Lab 03/27/17  0350 03/26/17  0353 03/25/17  1627  03/25/17  0457  03/24/17  0320  03/23/17  0935 03/23/17  0555 03/22/17  0515 03/21/17  2215 03/21/17  1327   HGB  --   --   --   --  11.3*  --  12.2  --   --  11.9 12.2  --  13.7   MCV  --   --   --   --  95  --  97  --   --  96 95  --  96   PLT  --   --   --   --  107*  --  135*  --   --  121* 182  --  187   INR  --   --   --   --   --   --   --   --   --   --  0.98  --  0.95   NA  --  147*  --   --  148*  --  148*  --   --  148* 146*  --  143   POTASSIUM 3.1* 3.3* 3.9  < > 3.4  < > 3.1*  < >  --  3.4 3.4  --  3.8   CHLORIDE  --  113*  --   --  116*  --  115*  --   --  116* 113*  --  108   CO2  --  26  --   --  25  --  25  --   --  24 24  --  28   BUN  --  14  --   --  14  --  16  --   --  22 16  --  23   CR  --  0.58  --   --  0.59  --  0.77  --   --  0.70 0.67  --  0.80   ANIONGAP  --  8  --   --  7  --  8  --   --  8 9  --  7   RADHA  --  7.6*  --   --  7.5*  --  7.8*  --   --  7.9* 8.0*  --  8.5   GLC  --  93  --   --  125*  --  111*  --   --  125* 174*  --  167*   TROPI  --   --   --   --   --   --   --   --  0.064*  --  0.031 <0.015  --    < > = values in this interval not displayed.        Imaging:      Imaging data reviewed.     Dr. Dipak Lucero D.O.  [facility]  Pager [phone]

## 2017-03-27 NOTE — PLAN OF CARE
Problem: Goal Outcome Summary  Goal: Goal Outcome Summary  Outcome: No Change  Pt remains lethargic and withdrawn. Will arouse to voice and follow commands, but needs reenforcement to keep eyes open during interaction. AxO x4. Unable to move LUE, rare LLE movement that is not to command. Left facial droop and no sensation on left side of face with left field cut. 2L NC. Tele SR c PVCs. K replaced this AM. Poor appetite, DD1 diet. Good UOP per wilder. Skin with scattered bruising. Nicardipine gtt remains for SBP goal <140. Sitter at bedside overnight, family present during evening hours, included on plan of care and questions answered. Continue mannitol.

## 2017-03-27 NOTE — PLAN OF CARE
Problem: Goal Outcome Summary  Goal: Goal Outcome Summary  OT: Pt completed grooming/hygiene tasks of hair care and face washing requiring min A and mod verbal cues to comb/wash L side. Max verbal cues t/o session to turn head to L, look to L side. Pt completed AAROM/PROM of LUE, trace muscle strength noted in L upper trapezius muscle, 10 reps x1 set. /72, HR 76 while positioned upright in converter chair. OT recommendation: ARU at discharge.

## 2017-03-27 NOTE — PROVIDER NOTIFICATION
Pt HR between 3rd degree AVB & 1st degree AVB w/frequent PVCs. Advise temporary pacer pads to be on Pt at all times w/atropine readily available. Please call Telemedicine ICU if we can be of assistance. Thank you for your care.    Michelle Hernandez, RN, BSN, CCRN  Telemedicine ICU, [facility]  [phone]

## 2017-03-27 NOTE — PROGRESS NOTES
ICU Multi-Disciplinary Note  Patient condition reviewed and discussed while on multidisciplinary rounds today.   Please note these minor interventions that were initiated:  1. BMP add-on  The Critical Care service will continue to follow peripherally while patient is within the ICU. We are readily available should issues arise.  Please feel free to contact us for anything with which we may be of assistance.    Annelise Levin PA-C  Pager: [phone]

## 2017-03-27 NOTE — CONSULTS
"CLINICAL NUTRITION SERVICES  -  ASSESSMENT NOTE      Recommendations Ordered by Registered Dietitian (RD):  [place] [employer] BID btw meals (145 kcals, 11 gm pro per serving)   Future/Additional Recommendations:   Recommend consider Calorie Counts in future if questionable oral intake.  If TF employed, recommend nocturnal TF to stimulate intake during the day.   Malnutrition:  Severe malnutrition  In Context of:  Acute illness or injury        REASON FOR ASSESSMENT  Marcia Kelley is a 76 year old female seen by Registered Dietitian for LOS (early) and Patient/Family request - Daughter is a [facility] diabetes education out-patient dietitian and has nutrition support questions.    NUTRITION HISTORY  - Information obtained from  [facility].  - Patient is on a regular diet at home.  - Typical food/fluid intake is good.  - Diet history:  They share the shopping and cooking at home.  Usual regular meals with good appetite and good oral intake.  \"Too good,\" per .   - Supplements:  None.   No food allergies/intolerances.      CURRENT NUTRITION ORDERS  Diet Order:     Dysphagia - DD1 with thin liquids     Current Intake/Tolerance:  Limited nutritional intake since admission x 6 days.  Pt was NPO from 3/21-3/23.  SLP completed bedside swallow evaluation on 3/23:  Moderate dysphagia.  3/24:  Diet advanced to nectar thick FL.  3/25:  Decreased appetite.  3/25:  Diet advanced to DD1 with thin liquids.  Pt had all Greek yogurt with peaches today, which was encouraging per family.  Pt was provided with all between meal supplement options and thought a strawberry Smoothie sounded the most appealing.  Daughter wondering at what point we would have to consider a tube feeding for her mother.      PHYSICAL FINDINGS  Observed  No nutrition-related physical findings observed  Obtained from Chart/Interdisciplinary Team  None noted    ANTHROPOMETRICS  Height: 5' 6\"  Admit Wt:  81.6 kg  Current Weight:  78.6 kg (173 lbs 4.5 " oz) - Down 3 kg since admit (4%).  Body mass index is 27.97 kg/(m^2).  Weight Status:  Overweight BMI 25-29.9  IBW:  59.1 kg  % IBW:  133%  Weight History:  Weight stable PTA per family.      LABS  Labs reviewed  K 3.1 (L)    Na 147    MEDICATIONS  Medications reviewed  Vit D, Co-Q - for supplementation  Mannitol - for cerebral edema  IVF NaCl at 75 mL/hr - for fluid delivery    Dosing Weight:  64 kg (adjusted for overweight)     ASSESSED NUTRITION NEEDS:  Estimated Energy Needs:  9786-9809 kcals (25-30 Kcal/Kg)  Justification: overweight  Estimated Protein Needs:  77-96 grams protein (1.2-1.5 g pro/Kg)  Justification: hypercatabolism with acute illness  Estimated Fluid Needs:  5706-0350 mL (1 mL/Kcal)  Justification: maintenance    MALNUTRITION:  % Weight Loss:  > 2% in 1 week (severe malnutrition)  % Intake:  </= 50% for >/= 5 days (severe malnutrition)  Subcutaneous Fat Loss:  None observed  Muscle Loss:  None observed  Fluid Retention:  None noted    Malnutrition Diagnosis: Severe malnutrition  In Context of:  Acute illness or injury    NUTRITION DIAGNOSIS:  Inadequate oral intake related to dysphagia, dislike of pureed foods, decreased appetite, and lethargy as evidenced by limited nutritional intake x 6 days with 4% weight loss.      NUTRITION INTERVENTIONS  Recommendations / Nutrition Prescription  DD1 with thin liquids - ADAT per SLP  [place] [employer] BID btw meals (145 kcals, 11 gm pro per serving)    Recommend consider calorie counts in future if questionable oral intake.  If TF becomes necessary, recommend nocturnal TF to stimulate intake during the day.    Implementation  Nutrition education: Provided education to  on small frequent feedings and the use of supplements to optimize intake.  Discussed patient's nutrition status with daughter Jocelyne and the plan to monitor intake over next several days to determine need for TF.  We could do a calorie count to document adequacy of oral intake.  If  pt requires a TF, a nocturnal TFwould allow pt to work on increasing intake during the day.    Collaboration and Referral of Nutrition care - Discussed pt during ICU interdisciplinary rounds this morning and with bedside RN.  Medical Food Supplement - Ordered above supplement in EPIC.    Nutrition Goals  Pt will consume > 75% meals and > 50% supplements in 3-4 days.    MONITORING AND EVALUATION:  Progress towards goals will be monitored and evaluated per protocol and Practice Guidelines    Olena Tran, KRISTIE, LD, CNSC

## 2017-03-27 NOTE — PROGRESS NOTES
[facility]  Neuroscience and Spine [place]  Neuro-ICU Progress Note       Admission Date: 3/21/2017  Date of Service:03/27/2017   Hospital Day: 7   _________________________________     Main Plans for Today   --continue supportive care  --Decrease Mannitol to Q12h  Assessment & Plan   #. (I63.411) Cerebral infarction due to embolism of right middle cerebral artery (H) (primary encounter diagnosis)  --Stroke consistent with embolic event  --Likely undiagnosed atrial fibrillation  -----Continue close monitoring for atrial fibrillation  --Hemorrhagic conversion will prevent anticoagulation at this time.   ---hold antiplatelets for 7 days  --A19 5.9  --TSH normal  #. (G93.6) Cerebral edema (H)  --Related to large R MCA stroke  --Increased on CT from 3/23/17, stable  --decrease Mannitol 50g to Q12H  -----repeat CT head tomorrow  #. (I51.7) Atrial dilatation, left  --Likely the sign of atrial fibrillation  #. (I10) Benign essential hypertension  --Keep BP <180 mm Hg  --Management per hospitalists.   #. (E55.9) Vitamin D deficiency  --level 16  --Started vitamin D3 2000U and CoQ10  #. (E78.2) Mixed hyperlipidemia  --  --Not contributory to this stroke  --Will not start statins at this time.   #. DVT Prophylaxis  --Mechanical only due to hemorrhagic conversion  #. PT/OT/Speech  --continue evaluations as able  #. Nutrition / GI Prophylaxis  --Per recommendations of speech therapy      CODE STATUS: Full Code    Family update by me today: yes    Interval History   Patient presented with sudden onset left sided weakness and collapse. Was shopping and while in the dressing room developed left sided weakness, facial droop, slurred speech, confusion and collapsed to the ground. Staff heard her fall and called 911 after finding her on the ground. Code stroke was called in the ED. CT head with contrast showed moderate to large right posterior division middle cerebral artery territory matched defect  "consistent with an infarct. CT angiogram of head and neck showed a filling defect versus short segment high-grade stenosis involving the distal right M1 branch with poor filling of the right M2 branch. Finding is probably due to thromboembolus. IV tPA was administered and she went to IR for possible thrombectomy. 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 Subsequent MRI brain demonstrated large posterior right MCA infarct with small hemorrhagic transformation and mild midline shift due to cerebral edema.    More awake. Follows commands on the right and minimally able to move left LE. No movement in the left UE. Denies headache.     Physical Exam       Vitals:  Vitals:    17 1300 17 1649 17 0600   Weight: 81.6 kg (179 lb 14.3 oz) 80.1 kg (176 lb 9.4 oz) 78.6 kg (173 lb 4.5 oz)     Temp: 97.4  F (36.3  C) Temp src: Oral BP: 127/76   Heart Rate: 76   SpO2: 95 % O2 Device: Nasal cannula Oxygen Delivery: 2 LPM Height: 167.6 cm (5' 6\") Weight: 78.6 kg (173 lb 4.5 oz)        Tmax: Temp (24hrs), Av.5  F (36.9  C), Min:97.9  F (36.6  C), Max:99.4  F (37.4  C)    BP - Mean:  [] 94   I&O:    Intake/Output Summary (Last 24 hours) at 17 0841  Last data filed at 17 0821   Gross per 24 hour   Intake          2919.58 ml   Output             4520 ml   Net         -1600.42 ml    I/O since admission: -2445.2  Bowel movement: --       General Appearance:   No acute distress  Neuro:       Mental Status Exam:   Awake, more alert, oriented X3. Mild dysarthria, no clear aphasia. Mental status is decreased       Cranial Nerves:  Pupils 3 mm, reactive. EOMI. Face sensation is normal. Left facial droop. Tongue and uvula are midline. Other CN are normal           Motor:  5/5 on the right, no movement LUE, 2/5 LLE. Tone and bulk are normal           Reflexes:  Normal DTR. Toes equivocal.        Sensory:  Left neglect                   Coordination:   Intact " finger-to-nose on the right, unable to assess on the left due to weakness       Gait:  Unable to assess  Cardiovascular: Regular rate and rhythm, no m/r/g  Lungs: No Data Recorded  Both hemithoraces are symmetrical, auscultation of lungs revealed no bronchovesicular sounds, expirium prolongation, wheezing, rhonci and crackles  Abdomen: Soft, not tender, not distended  Skin/Extremities: No clubbing, cyanosis, dependent edema       Medications   Infusions medications:    - MEDICATION INSTRUCTIONS -       NaCl 75 mL/hr at 03/27/17 0806     - MEDICATION INSTRUCTIONS -       - MEDICATION INSTRUCTIONS -       niCARdipine 40 mg in 200 mL 0.9% NaCl 2.5 mg/hr (03/27/17 0806)     Schedule medications:    mannitol  50 g Intravenous Q8H     sodium chloride (PF)  10 mL Intracatheter Q8H     cholecalciferol  2,000 Units Oral Daily     co-enzyme Q-10  100 mg Oral Daily     insulin aspart  1-3 Units Subcutaneous TID AC     insulin aspart  1-3 Units Subcutaneous At Bedtime     sodium chloride (PF)  10 mL Intravenous Once     sodium chloride (PF)  3 mL Intracatheter Q8H     famotidine  20 mg Intravenous BID     PRN medications:potassium phosphate (KPHOS) in D5W IV, potassium phosphate (KPHOS) in D5W IV, potassium phosphate (KPHOS) in D5W IV, potassium phosphate (KPHOS) in D5W IV, lidocaine 4%, heparin lock flush, sodium chloride (PF), glucose **OR** dextrose **OR** glucagon, - MEDICATION INSTRUCTIONS -, HOLD MEDICATION, metoclopramide **OR** metoclopramide, - MEDICATION INSTRUCTIONS -, - MEDICATION INSTRUCTIONS -, labetalol, naloxone, lidocaine, lidocaine 4%, sodium chloride (PF), potassium chloride, potassium chloride, potassium chloride, potassium chloride with lidocaine, potassium chloride, magnesium sulfate, acetaminophen, senna-docusate, magnesium hydroxide, bisacodyl, ondansetron **OR** ondansetron, prochlorperazine **OR** prochlorperazine **OR** prochlorperazine, hydrALAZINE  Data       Labotary Data:   All data was reviewed  by me personally  CBC RESULTS:  Recent Labs   Lab Test  03/25/17   0457  03/24/17   0320  03/23/17   0555  03/22/17   0515  03/21/17   1327   WBC  6.4  7.5  8.0  7.9  4.5   RBC  3.59*  3.84  3.73*  3.85  4.22   HGB  11.3*  12.2  11.9  12.2  13.7   HCT  34.1*  37.1  35.7  36.4  40.4   PLT  107*  135*  121*  182  187     Basic Metabolic Panel:  Recent Labs   Lab Test  03/27/17   0350  03/26/17   0353  03/25/17   1627  03/25/17   1135  03/25/17   0457  03/25/17   0020   03/24/17   0320   03/23/17   0555  03/22/17   0515  03/21/17   1327   NA   --   147*   --    --   148*   --    --   148*   --   148*  146*  143   POTASSIUM  3.1*  3.3*  3.9  3.2*  3.4  3.4   < >  3.1*   < >  3.4  3.4  3.8   CHLORIDE   --   113*   --    --   116*   --    --   115*   --   116*  113*  108   CO2   --   26   --    --   25   --    --   25   --   24  24  28   BUN   --   14   --    --   14   --    --   16   --   22  16  23   CR   --   0.58   --    --   0.59   --    --   0.77   --   0.70  0.67  0.80   GLC   --   93   --    --   125*   --    --   111*   --   125*  174*  167*   RADHA   --   7.6*   --    --   7.5*   --    --   7.8*   --   7.9*  8.0*  8.5    < > = values in this interval not displayed.     Liver panel:  Recent Labs   Lab Test  03/22/17 0515   PROTTOTAL  5.9*   ALBUMIN  3.0*   BILITOTAL  0.4   ALKPHOS  67   AST  22   ALT  16     Coagulation  Recent Labs   Lab Test  03/22/17   0515  03/21/17   1327   INR  0.98  0.95   PTT   --   29      Lipid Profile:  Recent Labs   Lab Test  03/21/17   1327   CHOL  207*   HDL  73   LDL  108*   TRIG  130     Thyroid Panel:  Recent Labs   Lab Test  03/21/17   1327   TSH  1.89      Vitamin B12:   Recent Labs   Lab Test  03/21/17   1327   B12  323      Vitamin D:  Recent Labs   Lab Test  03/21/17   1327   VITDT  16*     A1C:   Recent Labs   Lab Test  03/22/17   0515  03/21/17   1327   A1C  5.7  5.9     Troponin I:   Recent Labs   Lab Test  03/23/17   0935  03/22/17   0515  03/21/17   2215   TROPI  0.064*   0.031  <0.015     Serum Osmolality:  Recent Labs   Lab Test  03/27/17   0350  03/26/17   2030  03/26/17   1205  03/26/17   0353  03/25/17   1932  03/25/17   1135  03/25/17   0304   OSMOSERUM  301  302*  301  302*  306*  310*  313*            Cardiac US:   TTE 3/23/17:  Above the ST ridge, the aorta is 3.7 cm. as is the arch. The ascending aorta is borderline dilated. Left ventricular systolic function is normal. The visual ejection fraction is estimated at 55%. The left ventricle is mildly dilated. The left atrium is moderately dilated and the right atrium is mildly dilated. A contrast injection (Bubble Study) was performed that was negative for flow across the interatrial septum. Right ventricular systolic pressure is elevated, consistent with mild to moderate pulmonary hypertension. There is trace to mild mitral regurgitation and trace to mild aortic regurgitation. Contrast was used without apparent complications. Would consider a transesophageal echocardiogram if clinically indicated. The study was technically limited.       Imaging:   All imaging studies were reviewed personally  CT head 3/21/17:   --Minimal chronic changes. No evidence for intracranial hemorrhage or any acute process    CTA neck/head 3/21/17:  --Filling defect versus short segment high-grade stenosis involving the distal right M1 and proximal M2 branch with poor filling of a right M2 branch. Finding is probably due to a thromboembolus    CT perfusion 3/21/17:  --Moderate to large right posterior division middle cerebral artery territory matched defect consistent with infarct    Cerebral angiogram 3/21/17:  1. Complete occlusion of the posterior division of the right middle cerebral artery with clot at the M2 origin. The anterior division is widely patent, as is the M1 segment.  2. 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 As such, the longest available neuron Max guide  catheter was only able to be advanced to the proximal aspect of the 360 degree loop when completely hubbed at the skin. Mechanical thrombectomy was, therefore, not possible, and was not performed.  3. Minor plaque in the right carotid bulb, but no significant right internal carotid origin stenosis by NASA criteria.    MRI brain 3/22/17:   1. Moderate to large acute ischemic right posterior division middle cerebral artery territory infarct with some petechial hemorrhage. Difficult to exclude macroscopic hemorrhage. If clinically indicated, CT without contrast might be helpful in further evaluation. There is some localized mass effect with some very minimal right-to-left midline shift.  2. Small old bilateral cerebellar infarcts. In addition, there is a questionable tiny acute ischemic infarct in the left cerebellum which could just be due to due to T2 shine through.    CT head 3/23/17:  --Evolving right middle cerebral artery distribution infarct. Small amount of petechial hemorrhage within the infarct is unchanged. Slight increase in the amount of midline shift.    CT head 3/24/17:  1. Evolving large area of infarction right hemisphere.  2. No hemorrhage.  3. Moderate mass effect as previously described with some midline shift to the left but no interval change.    CT cervical spine 3/24/17:  1. No fracture or acute abnormality.  2. Minimal degenerative change as described.    CT head 3/26/17:  --Evolving ischemic right middle cerebral artery territory infarct with 9 mm of right-to-left shift and minimal prominence of the temporal horn. The mass effect is minimally more prominent than the prior study.      Time Spent on this Encounter      Time:   Neurocritical care time:  I spent 40 minutes bedside and on the inpatient unit today managing the neurocritical care of Marcia Kelley in relation to the issues listed in this note. Patient has very high risk of deterioration      Ann-Marie Cochran, FNP-BC

## 2017-03-27 NOTE — PLAN OF CARE
Problem: Goal Outcome Summary  Goal: Goal Outcome Summary  Outcome: Improving  [id number]  Neuro: Alert to lethargic. Seems to fall asleep between cares, oriented X4, appropiate, CAM-,  Cont with L facial droop, ptosis, L UE movement absent, LLE dorsiflexion, plantarflexion and bends knee slightly to command.  No sensation L face, arm, leg. I: Cont to monitor.  Nicardipine off but back on after 10 mg Hydralazine did not bring SB/P <140.  Ate yogert for breakfast but did not want anything for lunch.    Up in chair X 1 hr and became very tired.  Family talked to MAURICIO HALEY and Dr Lucero. Discussing post hosp plans with SS.

## 2017-03-27 NOTE — PLAN OF CARE
Problem: Goal Outcome Summary  Goal: Goal Outcome Summary  SLP: Swallow tx completed this PM to assess tolerance of diet. Pt refused trials of puree texturs. She drank ~3 oz of thin liquids without overt s/sx of aspiration. Swallow response prompt with consistent hyolaryngeal elevation. Advanced textures not trialed 2/2 pt fatigue. Pt and family participated in education re: safe swallowing precautions and oral cares. Verbalized understanding and denied further questions. Recommend continue current diet of dysphagia level 1 and thin liquids. No straws. Recommend small sips via cup and slow pace. Pt must be upright and fully alert for PO. Crush meds in purees. SLP will continue to follow for diet tolerance/advancement. Recommend ARU at discharge.

## 2017-03-27 NOTE — PROGRESS NOTES
Care Transition Initial Assessment - RUSTY  Reason For Consult: discharge planning  Met with: Family    Active Problems:    CVA (cerebral vascular accident) (H)         DATA  Lives With: spouse  Living Arrangements: house  Description of Support System: Supportive, Involved  Who is your support system?: , Children. SW met with pt's family (, daughter, great granddaughter) while pt receiving cares in room. Pt has been interdependent at home. Family states she is fiercely independent and motivated to return home.    Identified issues/concerns regarding health management:  Pt will require rehab stay at time of discharge. Family is requesting TCU's closer to their home in [facility]. ARU is being recommended at this time. SW discussed ARU vs TCU. Family given SW contact information and lists of TCU and ARU facilities.  Patient feels that they have adequate support @ home?  Yes:         Transportation Available: car, family or friend will provide      ASSESSMENT  Cognitive Status:  awake  Concerns to be addressed: ARU vs TCU for follow up rehab.       PLAN  Financial costs for the patient includes N/A  Patient given options and choices for discharge Yes..  Patient/family is agreeable to the plan?  Yes: Pt and family agree to need for rehabilitative stay following hospitalization. Family is open to recommendations.  Patient Goals and Preferences: TBD  Patient anticipates discharging to:  TBD

## 2017-03-28 ENCOUNTER — APPOINTMENT (OUTPATIENT)
Dept: SPEECH THERAPY | Facility: CLINIC | Age: 77
DRG: 061 | End: 2017-03-28
Payer: MEDICARE

## 2017-03-28 ENCOUNTER — APPOINTMENT (OUTPATIENT)
Dept: CT IMAGING | Facility: CLINIC | Age: 77
DRG: 061 | End: 2017-03-28
Attending: NURSE PRACTITIONER
Payer: MEDICARE

## 2017-03-28 LAB
ANION GAP SERPL CALCULATED.3IONS-SCNC: 7 MMOL/L (ref 3–14)
BUN SERPL-MCNC: 16 MG/DL (ref 7–30)
CALCIUM SERPL-MCNC: 7.7 MG/DL (ref 8.5–10.1)
CHLORIDE SERPL-SCNC: 115 MMOL/L (ref 94–109)
CO2 SERPL-SCNC: 26 MMOL/L (ref 20–32)
CREAT SERPL-MCNC: 0.5 MG/DL (ref 0.52–1.04)
ERYTHROCYTE [DISTWIDTH] IN BLOOD BY AUTOMATED COUNT: 13.2 % (ref 10–15)
GFR SERPL CREATININE-BSD FRML MDRD: ABNORMAL ML/MIN/1.7M2
GLUCOSE BLDC GLUCOMTR-MCNC: 166 MG/DL (ref 70–99)
GLUCOSE BLDC GLUCOMTR-MCNC: 198 MG/DL (ref 70–99)
GLUCOSE BLDC GLUCOMTR-MCNC: 96 MG/DL (ref 70–99)
GLUCOSE SERPL-MCNC: 104 MG/DL (ref 70–99)
HCT VFR BLD AUTO: 36.5 % (ref 35–47)
HGB BLD-MCNC: 12 G/DL (ref 11.7–15.7)
MAGNESIUM SERPL-MCNC: 2.2 MG/DL (ref 1.6–2.3)
MCH RBC QN AUTO: 31 PG (ref 26.5–33)
MCHC RBC AUTO-ENTMCNC: 32.9 G/DL (ref 31.5–36.5)
MCV RBC AUTO: 94 FL (ref 78–100)
OSMOLALITY SERPL: 305 MMOL/KG (ref 280–301)
PLATELET # BLD AUTO: 94 10E9/L (ref 150–450)
POTASSIUM SERPL-SCNC: 2.7 MMOL/L (ref 3.4–5.3)
POTASSIUM SERPL-SCNC: 3.6 MMOL/L (ref 3.4–5.3)
RBC # BLD AUTO: 3.87 10E12/L (ref 3.8–5.2)
SODIUM SERPL-SCNC: 148 MMOL/L (ref 133–144)
WBC # BLD AUTO: 6.2 10E9/L (ref 4–11)

## 2017-03-28 PROCEDURE — 25000128 H RX IP 250 OP 636: Performed by: NURSE PRACTITIONER

## 2017-03-28 PROCEDURE — S0028 INJECTION, FAMOTIDINE, 20 MG: HCPCS | Performed by: INTERNAL MEDICINE

## 2017-03-28 PROCEDURE — A9270 NON-COVERED ITEM OR SERVICE: HCPCS | Mod: GY | Performed by: PSYCHIATRY & NEUROLOGY

## 2017-03-28 PROCEDURE — 25000125 ZZHC RX 250: Performed by: HOSPITALIST

## 2017-03-28 PROCEDURE — 92526 ORAL FUNCTION THERAPY: CPT | Mod: GN | Performed by: SPEECH-LANGUAGE PATHOLOGIST

## 2017-03-28 PROCEDURE — 84132 ASSAY OF SERUM POTASSIUM: CPT | Performed by: INTERNAL MEDICINE

## 2017-03-28 PROCEDURE — 25000125 ZZHC RX 250: Performed by: INTERNAL MEDICINE

## 2017-03-28 PROCEDURE — 40000225 ZZH STATISTIC SLP WARD VISIT: Performed by: SPEECH-LANGUAGE PATHOLOGIST

## 2017-03-28 PROCEDURE — 25000128 H RX IP 250 OP 636: Performed by: RADIOLOGY

## 2017-03-28 PROCEDURE — 70450 CT HEAD/BRAIN W/O DYE: CPT

## 2017-03-28 PROCEDURE — 00000146 ZZHCL STATISTIC GLUCOSE BY METER IP

## 2017-03-28 PROCEDURE — 25000132 ZZH RX MED GY IP 250 OP 250 PS 637: Mod: GY | Performed by: PSYCHIATRY & NEUROLOGY

## 2017-03-28 PROCEDURE — 80048 BASIC METABOLIC PNL TOTAL CA: CPT | Performed by: HOSPITALIST

## 2017-03-28 PROCEDURE — 85027 COMPLETE CBC AUTOMATED: CPT | Performed by: HOSPITALIST

## 2017-03-28 PROCEDURE — 83735 ASSAY OF MAGNESIUM: CPT | Performed by: INTERNAL MEDICINE

## 2017-03-28 PROCEDURE — 25000125 ZZHC RX 250

## 2017-03-28 PROCEDURE — 20000003 ZZH R&B ICU

## 2017-03-28 PROCEDURE — 40000239 ZZH STATISTIC VAT ROUNDS

## 2017-03-28 PROCEDURE — 25000125 ZZHC RX 250: Performed by: PSYCHIATRY & NEUROLOGY

## 2017-03-28 PROCEDURE — 25000131 ZZH RX MED GY IP 250 OP 636 PS 637: Mod: GY | Performed by: HOSPITALIST

## 2017-03-28 PROCEDURE — 40000914 ZZH STATISTIC SITTER, DAY HOURS

## 2017-03-28 PROCEDURE — 83930 ASSAY OF BLOOD OSMOLALITY: CPT | Performed by: INTERNAL MEDICINE

## 2017-03-28 PROCEDURE — 99232 SBSQ HOSP IP/OBS MODERATE 35: CPT | Performed by: HOSPITALIST

## 2017-03-28 PROCEDURE — 25000132 ZZH RX MED GY IP 250 OP 250 PS 637: Mod: GY | Performed by: HOSPITALIST

## 2017-03-28 PROCEDURE — A9270 NON-COVERED ITEM OR SERVICE: HCPCS | Mod: GY | Performed by: HOSPITALIST

## 2017-03-28 RX ORDER — AMLODIPINE BESYLATE 5 MG/1
5 TABLET ORAL DAILY
Status: DISCONTINUED | OUTPATIENT
Start: 2017-03-28 | End: 2017-03-30

## 2017-03-28 RX ORDER — POTASSIUM CHLORIDE 1.5 G/1.58G
20-40 POWDER, FOR SOLUTION ORAL
Status: DISCONTINUED | OUTPATIENT
Start: 2017-03-28 | End: 2017-03-31 | Stop reason: HOSPADM

## 2017-03-28 RX ORDER — POTASSIUM CHLORIDE 7.45 MG/ML
10 INJECTION INTRAVENOUS
Status: DISCONTINUED | OUTPATIENT
Start: 2017-03-28 | End: 2017-03-31 | Stop reason: HOSPADM

## 2017-03-28 RX ORDER — MANNITOL 20 G/100ML
50 INJECTION, SOLUTION INTRAVENOUS EVERY 12 HOURS
Status: DISCONTINUED | OUTPATIENT
Start: 2017-03-28 | End: 2017-03-29

## 2017-03-28 RX ORDER — POTASSIUM CHLORIDE 29.8 MG/ML
INJECTION INTRAVENOUS
Status: COMPLETED
Start: 2017-03-28 | End: 2017-03-28

## 2017-03-28 RX ORDER — POTASSIUM CHLORIDE 29.8 MG/ML
20 INJECTION INTRAVENOUS
Status: DISCONTINUED | OUTPATIENT
Start: 2017-03-28 | End: 2017-03-31 | Stop reason: HOSPADM

## 2017-03-28 RX ORDER — MANNITOL 20 G/100ML
50 INJECTION, SOLUTION INTRAVENOUS DAILY
Status: DISCONTINUED | OUTPATIENT
Start: 2017-03-29 | End: 2017-03-28

## 2017-03-28 RX ORDER — POTASSIUM CHLORIDE 1500 MG/1
20-40 TABLET, EXTENDED RELEASE ORAL
Status: DISCONTINUED | OUTPATIENT
Start: 2017-03-28 | End: 2017-03-31 | Stop reason: HOSPADM

## 2017-03-28 RX ADMIN — MANNITOL 50 G: 20 INJECTION, SOLUTION INTRAVENOUS at 04:42

## 2017-03-28 RX ADMIN — Medication 100 MG: at 09:34

## 2017-03-28 RX ADMIN — POTASSIUM CHLORIDE 20 MEQ: 29.8 INJECTION, SOLUTION INTRAVENOUS at 09:58

## 2017-03-28 RX ADMIN — INSULIN ASPART 1 UNITS: 100 INJECTION, SOLUTION INTRAVENOUS; SUBCUTANEOUS at 17:29

## 2017-03-28 RX ADMIN — POTASSIUM CHLORIDE 20 MEQ: 29.8 INJECTION, SOLUTION INTRAVENOUS at 06:54

## 2017-03-28 RX ADMIN — POTASSIUM CHLORIDE 20 MEQ: 29.8 INJECTION, SOLUTION INTRAVENOUS at 15:34

## 2017-03-28 RX ADMIN — POTASSIUM CHLORIDE 20 MEQ: 29.8 INJECTION, SOLUTION INTRAVENOUS at 05:50

## 2017-03-28 RX ADMIN — MANNITOL 50 G: 20 INJECTION, SOLUTION INTRAVENOUS at 16:19

## 2017-03-28 RX ADMIN — SODIUM CHLORIDE: 9 INJECTION, SOLUTION INTRAVENOUS at 01:03

## 2017-03-28 RX ADMIN — HYDRALAZINE HYDROCHLORIDE 10 MG: 20 INJECTION INTRAMUSCULAR; INTRAVENOUS at 00:02

## 2017-03-28 RX ADMIN — AMLODIPINE BESYLATE 5 MG: 5 TABLET ORAL at 10:10

## 2017-03-28 RX ADMIN — VITAMIN D, TAB 1000IU (100/BT) 2000 UNITS: 25 TAB at 09:32

## 2017-03-28 RX ADMIN — FAMOTIDINE 20 MG: 10 INJECTION, SOLUTION INTRAVENOUS at 09:37

## 2017-03-28 RX ADMIN — INSULIN ASPART 1 UNITS: 100 INJECTION, SOLUTION INTRAVENOUS; SUBCUTANEOUS at 12:38

## 2017-03-28 RX ADMIN — SODIUM CHLORIDE: 9 INJECTION, SOLUTION INTRAVENOUS at 13:26

## 2017-03-28 RX ADMIN — HYDRALAZINE HYDROCHLORIDE 20 MG: 20 INJECTION INTRAMUSCULAR; INTRAVENOUS at 05:45

## 2017-03-28 RX ADMIN — HYDRALAZINE HYDROCHLORIDE 20 MG: 20 INJECTION INTRAMUSCULAR; INTRAVENOUS at 03:08

## 2017-03-28 NOTE — PROGRESS NOTES
"[facility]  Hospitalist Progress Note        Dipak Lucero, DO  03/28/2017        Interval History:      Patient with waxing and waning mentation. Started on oral antihypertensive.          Assessment and Plan:        Marcia Kelley is a 76 year old female with no known significant medical hx except for possible HTN and is on a baby aspirin daily who presents with sudden onset left sided weakness and collapse.       Acute right MCA territory stroke Associated with sudden onset left sided weakness, facial droop, slurred speech, confusion and collapsed. CTA as below. S/p iv tPA and and IR thrombectomy was attempted but was unsuccessful due to the anatomy of her prox R CCA with tortuosity and a significant 360 degree loop. S/p iv tPA. Appreciate Neurology consultation.   - Keep SBP < 180 and DBP < 105. On IV Nicardipine gtt   - Mannitol continued.    - Neurology managing fluids/nicardipine/ anticoag   - Started amlodipine.       Mechanical fall: On 3/24. Per notes, response called for fall.    - Imaging including CT head and neck as well as xrays negative.       Vitamin D deficiency: Returned low on 3/22.    - Vitamin D supplementation.       Possible HTN She denies any medical hx but her  reports a longstanding hx of HTN. She does not go to doctors as she thinks they are \"of no use\" according to her . BP mildly elevated her in the ER.    - Will monitor and treat as appropriate      DVT Prophylaxis: Pneumatic Compression Devices      Code: Full Code      Disposition: Pending clinical course. Continues on mannitol therapy in ICU at this time.                    Physical Exam:      Heart Rate: 65    Blood pressure 141/68, pulse 70, temperature 98.3  F (36.8  C), temperature source Oral, resp. rate 18, height 1.676 m (5' 6\"), weight 79 kg (174 lb 2.6 oz), SpO2 96 %.    Vitals:    03/21/17 1649 03/27/17 0600 03/28/17 0630   Weight: 80.1 kg (176 lb 9.4 oz) 78.6 kg (173 lb 4.5 oz) 79 kg " (174 lb 2.6 oz)       Vital Sign Ranges  Temperature Temp  Av  F (36.7  C)  Min: 97.6  F (36.4  C)  Max: 98.3  F (36.8  C)   Blood pressure Systolic (24hrs), Av , Min:109 , Max:149        Diastolic (24hrs), Av, Min:61, Max:86      Pulse No Data Recorded   Respirations Resp  Av  Min: 18  Max: 18   Pulse oximetry SpO2  Av.7 %  Min: 91 %  Max: 98 %     Vital Signs with Ranges  Temp:  [97.6  F (36.4  C)-98.3  F (36.8  C)] 98.3  F (36.8  C)  Heart Rate:  [60-80] 65  Resp:  [18] 18  BP: (109-149)/(61-86) 141/68  SpO2:  [91 %-98 %] 96 %    I/O Last 3 Shifts:   I/O last 3 completed shifts:  In: 1922.08 [P.O.:150; I.V.:1772.08]  Out: 3050 [Urine:3050]    I/O past 24 hours:     Intake/Output Summary (Last 24 hours) at 17 0902  Last data filed at 17 0602   Gross per 24 hour   Intake          1533.33 ml   Output             2550 ml   Net         -1016.67 ml     GENERAL: NAD. More alert today.   HEENT: Normocephalic. Nares normal.   LUNGS: Clear to auscultation. No dyspnea at rest.   HEART: Regular rate. Extremities perfused.   ABDOMEN: Soft, nontender, and nondistended. Positive bowel sounds.   EXTREMITIES: No LE edema noted.   NEUROLOGIC: dense LUE paralysis.          Prior to Admission Medications:        Prescriptions Prior to Admission   Medication Sig Dispense Refill Last Dose     ASPIRIN PO Take 81 mg by mouth At Bedtime    3/20/2017 at hs            Medications:        Current Facility-Administered Medications   Medication Last Rate     - MEDICATION INSTRUCTIONS -       NaCl 75 mL/hr at 17 0103     - MEDICATION INSTRUCTIONS -       - MEDICATION INSTRUCTIONS -       niCARdipine 40 mg in 200 mL 0.9% NaCl Stopped (17 0003)     Current Facility-Administered Medications   Medication Dose Route Frequency     mannitol  50 g Intravenous Q12H     sodium chloride (PF)  10 mL Intracatheter Q8H     cholecalciferol  2,000 Units Oral Daily     co-enzyme Q-10  100 mg Oral Daily      insulin aspart  1-3 Units Subcutaneous TID AC     insulin aspart  1-3 Units Subcutaneous At Bedtime     sodium chloride (PF)  10 mL Intravenous Once     sodium chloride (PF)  3 mL Intracatheter Q8H     famotidine  20 mg Intravenous BID     Current Facility-Administered Medications   Medication Dose Route Frequency     potassium phosphate (KPHOS) in D5W IV  15 mmol Intravenous Daily PRN     potassium phosphate (KPHOS) in D5W IV  20 mmol Intravenous Q6H PRN     potassium phosphate (KPHOS) in D5W IV  20 mmol Intravenous Q6H PRN     potassium phosphate (KPHOS) in D5W IV  25 mmol Intravenous Q8H PRN     lidocaine 4%   Topical Once PRN     heparin lock flush  2-5 mL Intracatheter Once PRN     sodium chloride (PF)  10-20 mL Intracatheter Q1H PRN     glucose  15-30 g Oral Q15 Min PRN    Or     dextrose  25-50 mL Intravenous Q15 Min PRN    Or     glucagon  1 mg Subcutaneous Q15 Min PRN     - MEDICATION INSTRUCTIONS -   Does not apply Continuous PRN     HOLD MEDICATION   Does not apply HOLD     metoclopramide  5 mg Oral Q6H PRN    Or     metoclopramide  5 mg Intravenous Q6H PRN     - MEDICATION INSTRUCTIONS -   Does not apply Continuous PRN     - MEDICATION INSTRUCTIONS -   Does not apply Continuous PRN     labetalol  10-20 mg Intravenous Q10 Min PRN     naloxone  0.1-0.4 mg Intravenous Q2 Min PRN     lidocaine  1 mL Other Q1H PRN     lidocaine 4%   Topical Q1H PRN     sodium chloride (PF)  3 mL Intracatheter Q1H PRN     potassium chloride  20-40 mEq Oral Q2H PRN     potassium chloride  20-40 mEq Oral or Feeding Tube Q2H PRN     potassium chloride  10 mEq Intravenous Q1H PRN     potassium chloride with lidocaine  10 mEq Intravenous Q1H PRN     potassium chloride  20 mEq Intravenous Q1H PRN     magnesium sulfate  4 g Intravenous Q4H PRN     acetaminophen  650 mg Rectal Q4H PRN     senna-docusate  1-2 tablet Oral BID PRN     magnesium hydroxide  30 mL Oral Daily PRN     bisacodyl  10 mg Rectal Daily PRN     ondansetron  4 mg  Oral Q6H PRN    Or     ondansetron  4 mg Intravenous Q6H PRN     prochlorperazine  5 mg Intravenous Q6H PRN    Or     prochlorperazine  5 mg Oral Q6H PRN    Or     prochlorperazine  12.5 mg Rectal Q12H PRN     hydrALAZINE  10-20 mg Intravenous Q30 Min PRN            Data:      Lab data reviewed.     Recent Labs  Lab 03/28/17  0445 03/27/17  1020 03/27/17  0350 03/26/17  0353  03/25/17  0457  03/24/17  0320  03/23/17  0935  03/22/17  0515 03/21/17  2215 03/21/17  1327   HGB 12.0  --   --   --   --  11.3*  --  12.2  --   --   < > 12.2  --  13.7   MCV 94  --   --   --   --  95  --  97  --   --   < > 95  --  96   PLT 94*  --   --   --   --  107*  --  135*  --   --   < > 182  --  187   INR  --   --   --   --   --   --   --   --   --   --   --  0.98  --  0.95   *  --  146* 147*  --  148*  --  148*  --   --   < > 146*  --  143   POTASSIUM 2.7* 3.4 3.1* 3.3*  < > 3.4  < > 3.1*  < >  --   < > 3.4  --  3.8   CHLORIDE 115*  --  112* 113*  --  116*  --  115*  --   --   < > 113*  --  108   CO2 26  --  24 26  --  25  --  25  --   --   < > 24  --  28   BUN 16  --  15 14  --  14  --  16  --   --   < > 16  --  23   CR 0.50*  --  0.56 0.58  --  0.59  --  0.77  --   --   < > 0.67  --  0.80   ANIONGAP 7  --   --  8  --  7  --  8  --   --   < > 9  --  7   RADHA 7.7*  --  7.9* 7.6*  --  7.5*  --  7.8*  --   --   < > 8.0*  --  8.5   *  --  93 93  --  125*  --  111*  --   --   < > 174*  --  167*   TROPI  --   --   --   --   --   --   --   --   --  0.064*  --  0.031 <0.015  --    < > = values in this interval not displayed.        Imaging:      Imaging data reviewed.     NITHYA GuerraO.  [facility]  Pager [phone]

## 2017-03-28 NOTE — PLAN OF CARE
Problem: Goal Outcome Summary  Goal: Goal Outcome Summary  SLP: Patient was seen for swallow treatment while up in the chair with her  and daughter present. She took several swallows of ensure via the spoon and cup. Premature entry suspected with mild delay but no oveert Sx of aspiration. Took several sips of water via the cup and tolerated with a mild delay but no overt Sx of aspiration. She took several teaspoons of pudding without difficulty. Due to fatigue advanced textures were not trialed. Recommend: 1. Continue on the Dysphagia Diet level 1 with thin liquids. 2. Upright, alert, small bites/sips, alternate liquids/solids. 3. SLP will f/u on 3/29/17 for diet advancement as tolerated. 4. Discharge to ARU/TCU pending endurance and tolerance.

## 2017-03-28 NOTE — PLAN OF CARE
Problem: Goal Outcome Summary  Goal: Goal Outcome Summary  Outcome: No Change  [id number]  Neuro: Remains same except seems more withdrawn, and appears to feel touch on L forehead and L cheek.    GI: Pt has no appetite but took ~1/2 oatmeal, strawberry supplement, and clear liquid Ensure. Had 2 stools.  CV: SB/P less than 140 with Norvasc.  U/O adequate.  Up in chair for 1 hr.

## 2017-03-28 NOTE — PLAN OF CARE
Problem: Goal Outcome Summary  Goal: Goal Outcome Summary  OT: Attempted to see patient however per nursing patient just was positioned for a nap and resting. Requested we come back later.

## 2017-03-28 NOTE — PLAN OF CARE
Problem: Goal Outcome Summary  Goal: Goal Outcome Summary  Outcome: Improving  Patient slowing improving throughout the shift. Neuro status stable, unchanged. Able to wean Nicardipine. Hydralazine given x3 PRN with desired effect. Large urine output via wilder in response to IV mannitol. K 2.7 this morning; patient receiving IV replacement at this time. Will continue to monitor and assess.

## 2017-03-28 NOTE — PROGRESS NOTES
[facility] [place]  Neuro-ICU Progress Note       Admission Date: 3/21/2017  Date of Service:03/28/2017   Hospital Day: 8   _________________________________     Main Plans for Today   --continue supportive care  --continue Mannitol q12h  Assessment & Plan   #. (I63.411) Cerebral infarction due to embolism of right middle cerebral artery (H) (primary encounter diagnosis)  --Stroke consistent with embolic event  --Likely undiagnosed atrial fibrillation  -----Continue close monitoring for atrial fibrillation  --Hemorrhagic conversion will prevent anticoagulation at this time.   ---hold antiplatelets for 7 days  --A19 5.9  --TSH normal  #. (G93.6) Cerebral edema (H)  --Related to large R MCA stroke  --Increased edema on CT from 3/23/17  -----repeat CT head with continued edema  ------continue mannitol q12h  #. (I51.7) Atrial dilatation, left  --Likely the sign of atrial fibrillation  #. (I10) Benign essential hypertension  --Keep BP <180 mm Hg  --Management per hospitalists.   #. (E55.9) Vitamin D deficiency  --level 16  --Started vitamin D3 2000U and CoQ10  #. (E78.2) Mixed hyperlipidemia  --  --Not contributory to this stroke  --Will not start statins at this time.   #. DVT Prophylaxis  --Mechanical only due to hemorrhagic conversion  #. PT/OT/Speech  --continue evaluations as able  #. Nutrition / GI Prophylaxis  --Per recommendations of speech therapy      CODE STATUS: Full Code    Family update by me today: yes    Interval History   Patient presented with sudden onset left sided weakness and collapse. Was shopping and while in the dressing room developed left sided weakness, facial droop, slurred speech, confusion and collapsed to the ground. Staff heard her fall and called 911 after finding her on the ground. Code stroke was called in the ED. CT head with contrast showed moderate to large right posterior division middle cerebral artery territory matched defect  "consistent with an infarct. CT angiogram of head and neck showed a filling defect versus short segment high-grade stenosis involving the distal right M1 branch with poor filling of the right M2 branch. Finding is probably due to thromboembolus. IV tPA was administered and she went to IR for possible thrombectomy. 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 Subsequent MRI brain demonstrated large posterior right MCA infarct with small hemorrhagic transformation and mild midline shift due to cerebral edema.    Follows commands on the right and minimally able to move left LE. No movement in the left UE. Denies headache. Answers questions appropriately. Still with edema on CT head. More lethargic today. Continue mannitol.    Physical Exam       Vitals:  Vitals:    17 1300 17 1649 17 0600 17 0630   Weight: 81.6 kg (179 lb 14.3 oz) 80.1 kg (176 lb 9.4 oz) 78.6 kg (173 lb 4.5 oz) 79 kg (174 lb 2.6 oz)     Temp: 98.3  F (36.8  C) Temp src: Oral BP: 141/68   Heart Rate: 65 Resp: 18 SpO2: 96 % O2 Device: Nasal cannula Oxygen Delivery: 2 LPM Height: 167.6 cm (5' 6\") Weight: 79 kg (174 lb 2.6 oz)        Tmax: Temp (24hrs), Av.5  F (36.9  C), Min:97.9  F (36.6  C), Max:99.4  F (37.4  C)    BP - Mean:  [] 108   I&O:    Intake/Output Summary (Last 24 hours) at 17 0841  Last data filed at 17 0821   Gross per 24 hour   Intake          2919.58 ml   Output             4520 ml   Net         -1600.42 ml    I/O since admission: -3348.12  Bowel movement: --       General Appearance:   No acute distress  Neuro:       Mental Status Exam:   Awake, more alert, oriented X3. Mild dysarthria, no clear aphasia. Mental status is decreased       Cranial Nerves:  Pupils 3 mm, reactive. EOMI. Face sensation is normal. Left facial droop. Tongue and uvula are midline. Other CN are normal           Motor:  5/5 on the right, no movement LUE, 2/5 LLE. Tone and bulk " are normal           Reflexes:  Normal DTR. Toes equivocal.        Sensory:  Left neglect                   Coordination:   Intact finger-to-nose on the right, unable to assess on the left due to weakness       Gait:  Unable to assess  Cardiovascular: Regular rate and rhythm, no m/r/g  Lungs: Resp: 18  Both hemithoraces are symmetrical, auscultation of lungs revealed no bronchovesicular sounds, expirium prolongation, wheezing, rhonci and crackles  Abdomen: Soft, not tender, not distended  Skin/Extremities: No clubbing, cyanosis, dependent edema       Medications   Infusions medications:    - MEDICATION INSTRUCTIONS -       NaCl 75 mL/hr at 03/28/17 0103     - MEDICATION INSTRUCTIONS -       - MEDICATION INSTRUCTIONS -       niCARdipine 40 mg in 200 mL 0.9% NaCl Stopped (03/28/17 0003)     Schedule medications:    [START ON 3/29/2017] mannitol  50 g Intravenous Daily     sodium chloride (PF)  10 mL Intracatheter Q8H     cholecalciferol  2,000 Units Oral Daily     co-enzyme Q-10  100 mg Oral Daily     insulin aspart  1-3 Units Subcutaneous TID AC     insulin aspart  1-3 Units Subcutaneous At Bedtime     sodium chloride (PF)  10 mL Intravenous Once     sodium chloride (PF)  3 mL Intracatheter Q8H     famotidine  20 mg Intravenous BID     PRN medications:potassium phosphate (KPHOS) in D5W IV, potassium phosphate (KPHOS) in D5W IV, potassium phosphate (KPHOS) in D5W IV, potassium phosphate (KPHOS) in D5W IV, lidocaine 4%, heparin lock flush, sodium chloride (PF), glucose **OR** dextrose **OR** glucagon, - MEDICATION INSTRUCTIONS -, HOLD MEDICATION, metoclopramide **OR** metoclopramide, - MEDICATION INSTRUCTIONS -, - MEDICATION INSTRUCTIONS -, labetalol, naloxone, lidocaine, lidocaine 4%, sodium chloride (PF), potassium chloride, potassium chloride, potassium chloride, potassium chloride with lidocaine, potassium chloride, magnesium sulfate, acetaminophen, senna-docusate, magnesium hydroxide, bisacodyl, ondansetron **OR**  ondansetron, prochlorperazine **OR** prochlorperazine **OR** prochlorperazine, hydrALAZINE  Data       Labotary Data:   All data was reviewed by me personally  CBC RESULTS:  Recent Labs   Lab Test  03/28/17   0445  03/25/17   0457  03/24/17   0320  03/23/17   0555  03/22/17   0515  03/21/17   1327   WBC  6.2  6.4  7.5  8.0  7.9  4.5   RBC  3.87  3.59*  3.84  3.73*  3.85  4.22   HGB  12.0  11.3*  12.2  11.9  12.2  13.7   HCT  36.5  34.1*  37.1  35.7  36.4  40.4   PLT  94*  107*  135*  121*  182  187     Basic Metabolic Panel:  Recent Labs   Lab Test  03/28/17   0445  03/27/17   1020  03/27/17   0350  03/26/17   0353  03/25/17   1627  03/25/17   1135  03/25/17   0457   03/24/17   0320   03/23/17   0555   NA  148*   --   146*  147*   --    --   148*   --   148*   --   148*   POTASSIUM  2.7*  3.4  3.1*  3.3*  3.9  3.2*  3.4   < >  3.1*   < >  3.4   CHLORIDE  115*   --   112*  113*   --    --   116*   --   115*   --   116*   CO2  26   --   24  26   --    --   25   --   25   --   24   BUN  16   --   15  14   --    --   14   --   16   --   22   CR  0.50*   --   0.56  0.58   --    --   0.59   --   0.77   --   0.70   GLC  104*   --   93  93   --    --   125*   --   111*   --   125*   RADHA  7.7*   --   7.9*  7.6*   --    --   7.5*   --   7.8*   --   7.9*    < > = values in this interval not displayed.     Liver panel:  Recent Labs   Lab Test  03/22/17 0515   PROTTOTAL  5.9*   ALBUMIN  3.0*   BILITOTAL  0.4   ALKPHOS  67   AST  22   ALT  16     Coagulation  Recent Labs   Lab Test  03/22/17   0515  03/21/17   1327   INR  0.98  0.95   PTT   --   29      Lipid Profile:  Recent Labs   Lab Test  03/21/17   1327   CHOL  207*   HDL  73   LDL  108*   TRIG  130     Thyroid Panel:  Recent Labs   Lab Test  03/21/17   1327   TSH  1.89      Vitamin B12:   Recent Labs   Lab Test  03/21/17   1327   B12  323      Vitamin D:  Recent Labs   Lab Test  03/21/17   1327   VITDT  16*     A1C:   Recent Labs   Lab Test  03/22/17   0515  03/21/17    1327   A1C  5.7  5.9     Troponin I:   Recent Labs   Lab Test  03/23/17   0935  03/22/17   0515  03/21/17   2215   TROPI  0.064*  0.031  <0.015     Serum Osmolality:  Recent Labs   Lab Test  03/27/17   1445  03/27/17   0350  03/26/17   2030  03/26/17   1205  03/26/17   0353  03/25/17   1932  03/25/17   1135   OSMOSERUM  302*  301  302*  301  302*  306*  310*            Cardiac US:   TTE 3/23/17:  Above the ST ridge, the aorta is 3.7 cm. as is the arch. The ascending aorta is borderline dilated. Left ventricular systolic function is normal. The visual ejection fraction is estimated at 55%. The left ventricle is mildly dilated. The left atrium is moderately dilated and the right atrium is mildly dilated. A contrast injection (Bubble Study) was performed that was negative for flow across the interatrial septum. Right ventricular systolic pressure is elevated, consistent with mild to moderate pulmonary hypertension. There is trace to mild mitral regurgitation and trace to mild aortic regurgitation. Contrast was used without apparent complications. Would consider a transesophageal echocardiogram if clinically indicated. The study was technically limited.       Imaging:   All imaging studies were reviewed personally  CT head 3/21/17:   --Minimal chronic changes. No evidence for intracranial hemorrhage or any acute process    CTA neck/head 3/21/17:  --Filling defect versus short segment high-grade stenosis involving the distal right M1 and proximal M2 branch with poor filling of a right M2 branch. Finding is probably due to a thromboembolus    CT perfusion 3/21/17:  --Moderate to large right posterior division middle cerebral artery territory matched defect consistent with infarct    Cerebral angiogram 3/21/17:  1. Complete occlusion of the posterior division of the right middle cerebral artery with clot at the M2 origin. The anterior division is widely patent, as is the M1 segment.  2. 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 As such, the longest available neuron Max guide catheter was only able to be advanced to the proximal aspect of the 360 degree loop when completely hubbed at the skin. Mechanical thrombectomy was, therefore, not possible, and was not performed.  3. Minor plaque in the right carotid bulb, but no significant right internal carotid origin stenosis by NASA criteria.    MRI brain 3/22/17:   1. Moderate to large acute ischemic right posterior division middle cerebral artery territory infarct with some petechial hemorrhage. Difficult to exclude macroscopic hemorrhage. If clinically indicated, CT without contrast might be helpful in further evaluation. There is some localized mass effect with some very minimal right-to-left midline shift.  2. Small old bilateral cerebellar infarcts. In addition, there is a questionable tiny acute ischemic infarct in the left cerebellum which could just be due to due to T2 shine through.    CT head 3/23/17:  --Evolving right middle cerebral artery distribution infarct. Small amount of petechial hemorrhage within the infarct is unchanged. Slight increase in the amount of midline shift.    CT head 3/24/17:  1. Evolving large area of infarction right hemisphere.  2. No hemorrhage.  3. Moderate mass effect as previously described with some midline shift to the left but no interval change.    CT cervical spine 3/24/17:  1. No fracture or acute abnormality.  2. Minimal degenerative change as described.    CT head 3/26/17:  --Evolving ischemic right middle cerebral artery territory infarct with 9 mm of right-to-left shift and minimal prominence of the temporal horn. The mass effect is minimally more prominent than the prior study.    CT head 3/27/17:  1. Evolving large right MCA territory infarct again noted. Edema within the infarct results in 0.9 cm of leftward midline shift of the septum pellucidum which is  unchanged.  2. No intracranial hemorrhage.  3. No new infarcts.      Time Spent on this Encounter      Time:   Neurocritical care time:  I spent 40 minutes bedside and on the inpatient unit today managing the neurocritical care of Marcia Kelley in relation to the issues listed in this note. Patient has very high risk of deterioration      Ann-Marie Cochran, FNP-BC

## 2017-03-28 NOTE — PROGRESS NOTES
ICU Multi-Disciplinary Note  Patient condition reviewed and discussed while on multidisciplinary rounds today.     The Critical Care service will continue to follow peripherally while patient is within the ICU. We are readily available should issues arise.  Please feel free to contact us for anything with which we may be of assistance.    Annelise Levin PA-C  Pager: [phone]

## 2017-03-29 ENCOUNTER — APPOINTMENT (OUTPATIENT)
Dept: OCCUPATIONAL THERAPY | Facility: CLINIC | Age: 77
DRG: 061 | End: 2017-03-29
Payer: MEDICARE

## 2017-03-29 ENCOUNTER — APPOINTMENT (OUTPATIENT)
Dept: PHYSICAL THERAPY | Facility: CLINIC | Age: 77
DRG: 061 | End: 2017-03-29
Payer: MEDICARE

## 2017-03-29 LAB
ANION GAP SERPL CALCULATED.3IONS-SCNC: 5 MMOL/L (ref 3–14)
BUN SERPL-MCNC: 15 MG/DL (ref 7–30)
CALCIUM SERPL-MCNC: 7.7 MG/DL (ref 8.5–10.1)
CHLORIDE SERPL-SCNC: 116 MMOL/L (ref 94–109)
CO2 SERPL-SCNC: 27 MMOL/L (ref 20–32)
CREAT SERPL-MCNC: 0.53 MG/DL (ref 0.52–1.04)
ERYTHROCYTE [DISTWIDTH] IN BLOOD BY AUTOMATED COUNT: 13.3 % (ref 10–15)
GFR SERPL CREATININE-BSD FRML MDRD: ABNORMAL ML/MIN/1.7M2
GLUCOSE BLDC GLUCOMTR-MCNC: 110 MG/DL (ref 70–99)
GLUCOSE BLDC GLUCOMTR-MCNC: 114 MG/DL (ref 70–99)
GLUCOSE BLDC GLUCOMTR-MCNC: 137 MG/DL (ref 70–99)
GLUCOSE BLDC GLUCOMTR-MCNC: 201 MG/DL (ref 70–99)
GLUCOSE BLDC GLUCOMTR-MCNC: 95 MG/DL (ref 70–99)
GLUCOSE SERPL-MCNC: 98 MG/DL (ref 70–99)
HCT VFR BLD AUTO: 35 % (ref 35–47)
HGB BLD-MCNC: 11.5 G/DL (ref 11.7–15.7)
MAGNESIUM SERPL-MCNC: 2.1 MG/DL (ref 1.6–2.3)
MCH RBC QN AUTO: 31.4 PG (ref 26.5–33)
MCHC RBC AUTO-ENTMCNC: 32.9 G/DL (ref 31.5–36.5)
MCV RBC AUTO: 96 FL (ref 78–100)
OSMOLALITY SERPL: 299 MMOL/KG (ref 280–301)
PHOSPHATE SERPL-MCNC: 2.3 MG/DL (ref 2.5–4.5)
PLATELET # BLD AUTO: 126 10E9/L (ref 150–450)
POTASSIUM SERPL-SCNC: 3.3 MMOL/L (ref 3.4–5.3)
RBC # BLD AUTO: 3.66 10E12/L (ref 3.8–5.2)
SODIUM SERPL-SCNC: 148 MMOL/L (ref 133–144)
WBC # BLD AUTO: 6.4 10E9/L (ref 4–11)

## 2017-03-29 PROCEDURE — 40000193 ZZH STATISTIC PT WARD VISIT: Performed by: PHYSICAL THERAPIST

## 2017-03-29 PROCEDURE — 25000128 H RX IP 250 OP 636: Performed by: NURSE PRACTITIONER

## 2017-03-29 PROCEDURE — 99232 SBSQ HOSP IP/OBS MODERATE 35: CPT | Performed by: HOSPITALIST

## 2017-03-29 PROCEDURE — A9270 NON-COVERED ITEM OR SERVICE: HCPCS | Mod: GY | Performed by: PSYCHIATRY & NEUROLOGY

## 2017-03-29 PROCEDURE — A9270 NON-COVERED ITEM OR SERVICE: HCPCS | Mod: GY | Performed by: HOSPITALIST

## 2017-03-29 PROCEDURE — 12000000 ZZH R&B MED SURG/OB

## 2017-03-29 PROCEDURE — 97530 THERAPEUTIC ACTIVITIES: CPT | Mod: GP | Performed by: PHYSICAL THERAPIST

## 2017-03-29 PROCEDURE — 25000132 ZZH RX MED GY IP 250 OP 250 PS 637: Mod: GY | Performed by: NURSE PRACTITIONER

## 2017-03-29 PROCEDURE — 97112 NEUROMUSCULAR REEDUCATION: CPT | Mod: GP | Performed by: PHYSICAL THERAPIST

## 2017-03-29 PROCEDURE — 83735 ASSAY OF MAGNESIUM: CPT | Performed by: HOSPITALIST

## 2017-03-29 PROCEDURE — 84100 ASSAY OF PHOSPHORUS: CPT | Performed by: HOSPITALIST

## 2017-03-29 PROCEDURE — 83930 ASSAY OF BLOOD OSMOLALITY: CPT | Performed by: HOSPITALIST

## 2017-03-29 PROCEDURE — 25000128 H RX IP 250 OP 636: Performed by: RADIOLOGY

## 2017-03-29 PROCEDURE — 80048 BASIC METABOLIC PNL TOTAL CA: CPT | Performed by: HOSPITALIST

## 2017-03-29 PROCEDURE — 25000132 ZZH RX MED GY IP 250 OP 250 PS 637: Mod: GY | Performed by: PSYCHIATRY & NEUROLOGY

## 2017-03-29 PROCEDURE — 25000125 ZZHC RX 250: Performed by: PSYCHIATRY & NEUROLOGY

## 2017-03-29 PROCEDURE — 97110 THERAPEUTIC EXERCISES: CPT | Mod: GO

## 2017-03-29 PROCEDURE — 40000133 ZZH STATISTIC OT WARD VISIT

## 2017-03-29 PROCEDURE — 40000239 ZZH STATISTIC VAT ROUNDS

## 2017-03-29 PROCEDURE — A9270 NON-COVERED ITEM OR SERVICE: HCPCS | Mod: GY | Performed by: NURSE PRACTITIONER

## 2017-03-29 PROCEDURE — 25000132 ZZH RX MED GY IP 250 OP 250 PS 637: Mod: GY | Performed by: HOSPITALIST

## 2017-03-29 PROCEDURE — 25000125 ZZHC RX 250: Performed by: HOSPITALIST

## 2017-03-29 PROCEDURE — 85027 COMPLETE CBC AUTOMATED: CPT | Performed by: HOSPITALIST

## 2017-03-29 PROCEDURE — 00000146 ZZHCL STATISTIC GLUCOSE BY METER IP

## 2017-03-29 PROCEDURE — 97535 SELF CARE MNGMENT TRAINING: CPT | Mod: GO

## 2017-03-29 RX ADMIN — SODIUM CHLORIDE: 9 INJECTION, SOLUTION INTRAVENOUS at 20:24

## 2017-03-29 RX ADMIN — HYDRALAZINE HYDROCHLORIDE 10 MG: 20 INJECTION INTRAMUSCULAR; INTRAVENOUS at 21:06

## 2017-03-29 RX ADMIN — AMLODIPINE BESYLATE 5 MG: 5 TABLET ORAL at 08:23

## 2017-03-29 RX ADMIN — POTASSIUM PHOSPHATE, MONOBASIC AND POTASSIUM PHOSPHATE, DIBASIC 15 MMOL: 224; 236 INJECTION, SOLUTION INTRAVENOUS at 06:29

## 2017-03-29 RX ADMIN — MANNITOL 50 G: 20 INJECTION, SOLUTION INTRAVENOUS at 05:19

## 2017-03-29 RX ADMIN — ASPIRIN 325 MG: 325 TABLET, DELAYED RELEASE ORAL at 12:40

## 2017-03-29 RX ADMIN — VITAMIN D, TAB 1000IU (100/BT) 2000 UNITS: 25 TAB at 08:23

## 2017-03-29 RX ADMIN — POTASSIUM CHLORIDE 20 MEQ: 29.8 INJECTION, SOLUTION INTRAVENOUS at 06:14

## 2017-03-29 RX ADMIN — SODIUM CHLORIDE: 9 INJECTION, SOLUTION INTRAVENOUS at 06:14

## 2017-03-29 RX ADMIN — POTASSIUM CHLORIDE 20 MEQ: 29.8 INJECTION, SOLUTION INTRAVENOUS at 07:22

## 2017-03-29 RX ADMIN — INSULIN ASPART 1 UNITS: 100 INJECTION, SOLUTION INTRAVENOUS; SUBCUTANEOUS at 08:12

## 2017-03-29 ASSESSMENT — VISUAL ACUITY: OU: NORMAL ACUITY;BLINKS TO THREAT

## 2017-03-29 NOTE — PLAN OF CARE
Problem: Goal Outcome Summary  Goal: Goal Outcome Summary  PT: Max A x 2 for supine <> sit EOB due L hemiparesis. Facilitated core and trunk activation and weight shifts in supported sitting EOB, progressed from max A to min A after cuing and interventions. Worked on neuro re-ed for LLE in supine with improving antigravity ankle DF, knee flexion and hip flexion with min A after tactile cues and facilitation. Continues to demo 2-/5 hip abduction/adduciton in gravity eliminated position.  Recommend: ARU

## 2017-03-29 NOTE — PLAN OF CARE
Problem: Goal Outcome Summary  Goal: Goal Outcome Summary  Outcome: No Change  Afebrile.  Neuro: Left facial droop.  LUE withdraws.  LLE weak Dorsalflexion/plantarflexion.  RUE/RLE strong.  Oriented x4.  PERRL.  LS diminished.  On 2L NC.  SR/SB.  Goal SBP<180 per orders.  Dysphagia level 1.  BM smear.  Good UOP.  Ecchymotic on right side. Potassium and phosphorus replacement infusing.  Family updated at bedside.

## 2017-03-29 NOTE — PROGRESS NOTES
"[facility]  Hospitalist Progress Note        Dipak Lina Nic, DO  03/29/2017        Interval History:      Patient progressing. Discussed with family at bedside.          Assessment and Plan:        Marcia Kleley is a 76 year old female with no known significant medical hx except for possible HTN and is on a baby aspirin daily who presents with sudden onset left sided weakness and collapse.       Acute right MCA territory stroke Associated with sudden onset left sided weakness, facial droop, slurred speech, confusion and collapsed. CTA as below. S/p iv tPA and and IR thrombectomy was attempted but was unsuccessful due to the anatomy of her prox R CCA with tortuosity and a significant 360 degree loop. S/p iv tPA. Appreciate Neurology consultation.  - Keep SBP < 180 and DBP < 105. On IV Nicardipine gtt  - Mannitol discontinued.   - Neurology managing fluids/nicardipine/ anticoag  - Continue amlodipine.       Mechanical fall: On 3/24. Per notes, response called for fall.   - Imaging including CT head and neck as well as xrays negative.       Vitamin D deficiency: Returned low on 3/22.   - Vitamin D supplementation.       Possible HTN She denies any medical hx but her  reports a longstanding hx of HTN. She does not go to doctors as she thinks they are \"of no use\" according to her . BP mildly elevated her in the ER.   - Will monitor and treat as appropriate      DVT Prophylaxis: Pneumatic Compression Devices      Code: Full Code      Disposition: Pending clinical course. Possible transfer to Neurology floor tomorrow if ok with Neurology.                    Physical Exam:      Heart Rate: 57    Blood pressure 156/85, pulse 70, temperature 97.4  F (36.3  C), temperature source Oral, resp. rate 16, height 1.676 m (5' 6\"), weight 79.3 kg (174 lb 13.2 oz), SpO2 98 %.    Vitals:    03/27/17 0600 03/28/17 0630 03/29/17 0629   Weight: 78.6 kg (173 lb 4.5 oz) 79 kg (174 lb 2.6 oz) 79.3 kg " (174 lb 13.2 oz)       Vital Sign Ranges  Temperature Temp  Av  F (36.7  C)  Min: 97.4  F (36.3  C)  Max: 98.6  F (37  C)   Blood pressure Systolic (24hrs), Av , Min:118 , Max:168        Diastolic (24hrs), Av, Min:59, Max:92      Pulse No Data Recorded   Respirations Resp  Avg: 15.6  Min: 10  Max: 20   Pulse oximetry SpO2  Av.1 %  Min: 92 %  Max: 98 %     Vital Signs with Ranges  Temp:  [97.4  F (36.3  C)-98.6  F (37  C)] 97.4  F (36.3  C)  Heart Rate:  [47-89] 57  Resp:  [10-20] 16  BP: (118-168)/(59-92) 156/85  SpO2:  [92 %-98 %] 98 %    I/O Last 3 Shifts:   I/O last 3 completed shifts:  In: 3750 [P.O.:1155; I.V.:2595]  Out: 2725 [Urine:2725]    I/O past 24 hours:     Intake/Output Summary (Last 24 hours) at 17 0830  Last data filed at 17 0600   Gross per 24 hour   Intake             3540 ml   Output             2575 ml   Net              965 ml     GENERAL: NAD. Transferring from chair to bed.   HEENT: Normocephalic. Nares normal.   LUNGS: No dyspnea at rest.   HEART: Extremities perfused.    EXTREMITIES: No LE edema noted.   NEUROLOGIC: Left sided paresis continued.          Prior to Admission Medications:        Prescriptions Prior to Admission   Medication Sig Dispense Refill Last Dose     ASPIRIN PO Take 81 mg by mouth At Bedtime    3/20/2017 at hs            Medications:        Current Facility-Administered Medications   Medication Last Rate     - MEDICATION INSTRUCTIONS -       NaCl 75 mL/hr at 17 0614     - MEDICATION INSTRUCTIONS -       - MEDICATION INSTRUCTIONS -       niCARdipine 40 mg in 200 mL 0.9% NaCl Stopped (17 0003)     Current Facility-Administered Medications   Medication Dose Route Frequency     mannitol  50 g Intravenous Q12H     amLODIPine  5 mg Oral Daily     sodium chloride (PF)  10 mL Intracatheter Q8H     cholecalciferol  2,000 Units Oral Daily     co-enzyme Q-10  100 mg Oral Daily     insulin aspart  1-3 Units Subcutaneous TID AC     insulin  aspart  1-3 Units Subcutaneous At Bedtime     sodium chloride (PF)  10 mL Intravenous Once     sodium chloride (PF)  3 mL Intracatheter Q8H     Current Facility-Administered Medications   Medication Dose Route Frequency     potassium chloride  20-40 mEq Oral Q2H PRN     potassium chloride  20-40 mEq Oral or Feeding Tube Q2H PRN     potassium chloride  10 mEq Intravenous Q1H PRN     potassium chloride with lidocaine  10 mEq Intravenous Q1H PRN     potassium chloride  20 mEq Intravenous Q1H PRN     potassium phosphate (KPHOS) in D5W IV  15 mmol Intravenous Daily PRN     potassium phosphate (KPHOS) in D5W IV  20 mmol Intravenous Q6H PRN     potassium phosphate (KPHOS) in D5W IV  20 mmol Intravenous Q6H PRN     potassium phosphate (KPHOS) in D5W IV  25 mmol Intravenous Q8H PRN     lidocaine 4%   Topical Once PRN     heparin lock flush  2-5 mL Intracatheter Once PRN     sodium chloride (PF)  10-20 mL Intracatheter Q1H PRN     glucose  15-30 g Oral Q15 Min PRN    Or     dextrose  25-50 mL Intravenous Q15 Min PRN    Or     glucagon  1 mg Subcutaneous Q15 Min PRN     - MEDICATION INSTRUCTIONS -   Does not apply Continuous PRN     HOLD MEDICATION   Does not apply HOLD     metoclopramide  5 mg Oral Q6H PRN    Or     metoclopramide  5 mg Intravenous Q6H PRN     - MEDICATION INSTRUCTIONS -   Does not apply Continuous PRN     - MEDICATION INSTRUCTIONS -   Does not apply Continuous PRN     labetalol  10-20 mg Intravenous Q10 Min PRN     naloxone  0.1-0.4 mg Intravenous Q2 Min PRN     lidocaine  1 mL Other Q1H PRN     lidocaine 4%   Topical Q1H PRN     sodium chloride (PF)  3 mL Intracatheter Q1H PRN     magnesium sulfate  4 g Intravenous Q4H PRN     acetaminophen  650 mg Rectal Q4H PRN     senna-docusate  1-2 tablet Oral BID PRN     magnesium hydroxide  30 mL Oral Daily PRN     bisacodyl  10 mg Rectal Daily PRN     ondansetron  4 mg Oral Q6H PRN    Or     ondansetron  4 mg Intravenous Q6H PRN     prochlorperazine  5 mg  Intravenous Q6H PRN    Or     prochlorperazine  5 mg Oral Q6H PRN    Or     prochlorperazine  12.5 mg Rectal Q12H PRN     hydrALAZINE  10-20 mg Intravenous Q30 Min PRN            Data:      Lab data reviewed.     Recent Labs  Lab 03/29/17  0420 03/28/17  1330 03/28/17  0445  03/27/17  0350 03/26/17  0353  03/25/17  0457  03/23/17  0935   HGB 11.5*  --  12.0  --   --   --   --  11.3*  < >  --    MCV 96  --  94  --   --   --   --  95  < >  --    *  --  94*  --   --   --   --  107*  < >  --    *  --  148*  --  146* 147*  --  148*  < >  --    POTASSIUM 3.3* 3.6 2.7*  < > 3.1* 3.3*  < > 3.4  < >  --    CHLORIDE 116*  --  115*  --  112* 113*  --  116*  < >  --    CO2 27  --  26  --  24 26  --  25  < >  --    BUN 15  --  16  --  15 14  --  14  < >  --    CR 0.53  --  0.50*  --  0.56 0.58  --  0.59  < >  --    ANIONGAP 5  --  7  --   --  8  --  7  < >  --    RADHA 7.7*  --  7.7*  --  7.9* 7.6*  --  7.5*  < >  --    GLC 98  --  104*  --  93 93  --  125*  < >  --    TROPI  --   --   --   --   --   --   --   --   --  0.064*   < > = values in this interval not displayed.        Imaging:      Imaging data reviewed.     Dr. Dipak Lucero D.O.  [facility]  Pager [phone]

## 2017-03-29 NOTE — PROGRESS NOTES
[facility]  Neuroscience and Spine [place]  Neuro-ICU Progress Note       Admission Date: 3/21/2017  Date of Service:03/29/2017   Hospital Day: 9   _________________________________     Main Plans for Today   --continue supportive care  --stop Mannitol   --start aspirin  --may be able to transfer to  later today if stable and bed available  Assessment & Plan   #. (I63.411) Cerebral infarction due to embolism of right middle cerebral artery (H) (primary encounter diagnosis)  --Stroke consistent with embolic event  --Likely undiagnosed atrial fibrillation  -----Continue close monitoring for atrial fibrillation  --Hemorrhagic conversion will prevent anticoagulation at this time.   ---ok to start aspirin 325 mg daily, was on 81 mg PTA  --A19 5.9  --TSH normal  #. (G93.6) Cerebral edema (H)  --Related to large R MCA stroke  --Increased edema on CT from 3/23/17  -----repeat CT head with continued edema, stable  ------will stop mannitol   #. (I51.7) Atrial dilatation, left  --Likely the sign of atrial fibrillation  #. (I10) Benign essential hypertension  --Keep BP <180 mm Hg  --Management per hospitalists.   #. (E55.9) Vitamin D deficiency  --level 16  --continue vitamin D3 2000U and CoQ10  #. (E78.2) Mixed hyperlipidemia  --  --Not contributory to this stroke  --Will not start statins at this time.   #. DVT Prophylaxis  --Mechanical only due to hemorrhagic conversion  #. PT/OT/Speech  --continue evaluations as able  ----recommending ARU  #. Nutrition / GI Prophylaxis  --Per recommendations of speech therapy      CODE STATUS: Full Code    Family update by me today: yes    Interval History   Patient presented with sudden onset left sided weakness and collapse. Was shopping and while in the dressing room developed left sided weakness, facial droop, slurred speech, confusion and collapsed to the ground. Staff heard her fall and called 911 after finding her on the ground. Code stroke was called in  "the ED. CT head with contrast showed moderate to large right posterior division middle cerebral artery territory matched defect consistent with an infarct. CT angiogram of head and neck showed a filling defect versus short segment high-grade stenosis involving the distal right M1 branch with poor filling of the right M2 branch. Finding is probably due to thromboembolus. IV tPA was administered and she went to IR for possible thrombectomy. 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 Subsequent MRI brain demonstrated large posterior right MCA infarct with small hemorrhagic transformation and mild midline shift due to cerebral edema.    Follows commands on the right and minimally able to move left LE. No movement in the left UE. Denies headache. Answers questions appropriately. Still with edema on CT head. Completed mannitol. More awake again today, sitting up in the chair.    Physical Exam       Vitals:  Vitals:    17 1300 17 1649 17 0600 17 0630   Weight: 81.6 kg (179 lb 14.3 oz) 80.1 kg (176 lb 9.4 oz) 78.6 kg (173 lb 4.5 oz) 79 kg (174 lb 2.6 oz)    17 0629   Weight: 79.3 kg (174 lb 13.2 oz)     Temp: 97.4  F (36.3  C) Temp src: Axillary BP: 147/78   Heart Rate: 57 Resp: 16 SpO2: 98 % O2 Device: Nasal cannula Oxygen Delivery: 2 LPM Height: 167.6 cm (5' 6\") Weight: 79.3 kg (174 lb 13.2 oz)        Tmax: Temp (24hrs), Av.5  F (36.9  C), Min:97.9  F (36.6  C), Max:99.4  F (37.4  C)    BP - Mean:  [] 105   I&O:    Intake/Output Summary (Last 24 hours) at 17 0841  Last data filed at 17 0821   Gross per 24 hour   Intake          2919.58 ml   Output             4520 ml   Net         -1600.42 ml    I/O since admission: -.12  Bowel movement: 3x       General Appearance:   No acute distress  Neuro:       Mental Status Exam:   Awake, more alert, oriented X3. Mild dysarthria, no clear aphasia. Mental status is mildly " decreased       Cranial Nerves:  Pupils 3 mm, reactive. EOMI. Face sensation is normal. Left facial droop. Tongue and uvula are midline. Other CN are normal           Motor:  5/5 on the right, no movement LUE, 2/5 LLE. Tone and bulk are normal           Reflexes:  Normal DTR. Toes equivocal.        Sensory:  Left neglect                   Coordination:   Intact finger-to-nose on the right, unable to assess on the left due to weakness       Gait:  Unable to assess  Cardiovascular: Regular rate and rhythm, no m/r/g  Lungs: Resp: 16  Both hemithoraces are symmetrical, auscultation of lungs revealed no bronchovesicular sounds, expirium prolongation, wheezing, rhonci and crackles  Abdomen: Soft, not tender, not distended  Skin/Extremities: No clubbing, cyanosis, dependent edema       Medications   Infusions medications:    - MEDICATION INSTRUCTIONS -       NaCl 75 mL/hr at 03/29/17 0614     - MEDICATION INSTRUCTIONS -       - MEDICATION INSTRUCTIONS -       niCARdipine 40 mg in 200 mL 0.9% NaCl Stopped (03/28/17 0003)     Schedule medications:    mannitol  50 g Intravenous Q12H     amLODIPine  5 mg Oral Daily     sodium chloride (PF)  10 mL Intracatheter Q8H     cholecalciferol  2,000 Units Oral Daily     co-enzyme Q-10  100 mg Oral Daily     insulin aspart  1-3 Units Subcutaneous TID AC     insulin aspart  1-3 Units Subcutaneous At Bedtime     sodium chloride (PF)  10 mL Intravenous Once     sodium chloride (PF)  3 mL Intracatheter Q8H     PRN medications:potassium chloride, potassium chloride, potassium chloride, potassium chloride with lidocaine, potassium chloride, potassium phosphate (KPHOS) in D5W IV, potassium phosphate (KPHOS) in D5W IV, potassium phosphate (KPHOS) in D5W IV, potassium phosphate (KPHOS) in D5W IV, lidocaine 4%, heparin lock flush, sodium chloride (PF), glucose **OR** dextrose **OR** glucagon, - MEDICATION INSTRUCTIONS -, HOLD MEDICATION, metoclopramide **OR** metoclopramide, - MEDICATION  INSTRUCTIONS -, - MEDICATION INSTRUCTIONS -, labetalol, naloxone, lidocaine, lidocaine 4%, sodium chloride (PF), magnesium sulfate, acetaminophen, senna-docusate, magnesium hydroxide, bisacodyl, ondansetron **OR** ondansetron, prochlorperazine **OR** prochlorperazine **OR** prochlorperazine, hydrALAZINE  Data       Labotary Data:   All data was reviewed by me personally  CBC RESULTS:  Recent Labs   Lab Test  03/29/17   0420  03/28/17   0445  03/25/17   0457  03/24/17   0320  03/23/17   0555  03/22/17   0515   WBC  6.4  6.2  6.4  7.5  8.0  7.9   RBC  3.66*  3.87  3.59*  3.84  3.73*  3.85   HGB  11.5*  12.0  11.3*  12.2  11.9  12.2   HCT  35.0  36.5  34.1*  37.1  35.7  36.4   PLT  126*  94*  107*  135*  121*  182     Basic Metabolic Panel:  Recent Labs   Lab Test  03/29/17   0420  03/28/17   1330  03/28/17   0445  03/27/17   1020  03/27/17   0350  03/26/17   0353   03/25/17   0457   03/24/17   0320   NA  148*   --   148*   --   146*  147*   --   148*   --   148*   POTASSIUM  3.3*  3.6  2.7*  3.4  3.1*  3.3*   < >  3.4   < >  3.1*   CHLORIDE  116*   --   115*   --   112*  113*   --   116*   --   115*   CO2  27   --   26   --   24  26   --   25   --   25   BUN  15   --   16   --   15  14   --   14   --   16   CR  0.53   --   0.50*   --   0.56  0.58   --   0.59   --   0.77   GLC  98   --   104*   --   93  93   --   125*   --   111*   RADHA  7.7*   --   7.7*   --   7.9*  7.6*   --   7.5*   --   7.8*    < > = values in this interval not displayed.     Liver panel:  Recent Labs   Lab Test  03/22/17   0515   PROTTOTAL  5.9*   ALBUMIN  3.0*   BILITOTAL  0.4   ALKPHOS  67   AST  22   ALT  16     Coagulation  Recent Labs   Lab Test  03/22/17   0515  03/21/17   1327   INR  0.98  0.95   PTT   --   29      Lipid Profile:  Recent Labs   Lab Test  03/21/17   1327   CHOL  207*   HDL  73   LDL  108*   TRIG  130     Thyroid Panel:  Recent Labs   Lab Test  03/21/17   1327   TSH  1.89      Vitamin B12:   Recent Labs   Lab Test  03/21/17    1327   B12  323      Vitamin D:  Recent Labs   Lab Test  03/21/17   1327   VITDT  16*     A1C:   Recent Labs   Lab Test  03/22/17   0515  03/21/17   1327   A1C  5.7  5.9     Troponin I:   Recent Labs   Lab Test  03/23/17   0935  03/22/17   0515  03/21/17   2215   TROPI  0.064*  0.031  <0.015     Serum Osmolality:  Recent Labs   Lab Test  03/29/17   0420  03/28/17   1330  03/27/17   1445  03/27/17   0350  03/26/17   2030  03/26/17   1205  03/26/17   0353   OSMOSERUM  299  305*  302*  301  302*  301  302*            Cardiac US:   TTE 3/23/17:  Above the ST ridge, the aorta is 3.7 cm. as is the arch. The ascending aorta is borderline dilated. Left ventricular systolic function is normal. The visual ejection fraction is estimated at 55%. The left ventricle is mildly dilated. The left atrium is moderately dilated and the right atrium is mildly dilated. A contrast injection (Bubble Study) was performed that was negative for flow across the interatrial septum. Right ventricular systolic pressure is elevated, consistent with mild to moderate pulmonary hypertension. There is trace to mild mitral regurgitation and trace to mild aortic regurgitation. Contrast was used without apparent complications. Would consider a transesophageal echocardiogram if clinically indicated. The study was technically limited.       Imaging:   All imaging studies were reviewed personally  CT head 3/21/17:   --Minimal chronic changes. No evidence for intracranial hemorrhage or any acute process    CTA neck/head 3/21/17:  --Filling defect versus short segment high-grade stenosis involving the distal right M1 and proximal M2 branch with poor filling of a right M2 branch. Finding is probably due to a thromboembolus    CT perfusion 3/21/17:  --Moderate to large right posterior division middle cerebral artery territory matched defect consistent with infarct    Cerebral angiogram 3/21/17:  1. Complete occlusion of the posterior division of the right  middle cerebral artery with clot at the M2 origin. The anterior division is widely patent, as is the M1 segment.  2. 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 As such, the longest available neuron Max guide catheter was only able to be advanced to the proximal aspect of the 360 degree loop when completely hubbed at the skin. Mechanical thrombectomy was, therefore, not possible, and was not performed.  3. Minor plaque in the right carotid bulb, but no significant right internal carotid origin stenosis by NASA criteria.    MRI brain 3/22/17:   1. Moderate to large acute ischemic right posterior division middle cerebral artery territory infarct with some petechial hemorrhage. Difficult to exclude macroscopic hemorrhage. If clinically indicated, CT without contrast might be helpful in further evaluation. There is some localized mass effect with some very minimal right-to-left midline shift.  2. Small old bilateral cerebellar infarcts. In addition, there is a questionable tiny acute ischemic infarct in the left cerebellum which could just be due to due to T2 shine through.    CT head 3/23/17:  --Evolving right middle cerebral artery distribution infarct. Small amount of petechial hemorrhage within the infarct is unchanged. Slight increase in the amount of midline shift.    CT head 3/24/17:  1. Evolving large area of infarction right hemisphere.  2. No hemorrhage.  3. Moderate mass effect as previously described with some midline shift to the left but no interval change.    CT cervical spine 3/24/17:  1. No fracture or acute abnormality.  2. Minimal degenerative change as described.    CT head 3/26/17:  --Evolving ischemic right middle cerebral artery territory infarct with 9 mm of right-to-left shift and minimal prominence of the temporal horn. The mass effect is minimally more prominent than the prior study.    CT head 3/28/17:  1. Evolving large  right MCA territory infarct again noted. Edema within the infarct results in 0.9 cm of leftward midline shift of the septum pellucidum which is unchanged.  2. No intracranial hemorrhage.  3. No new infarcts.      Time Spent on this Encounter      Time:   Neurocritical care time:  I spent 40 minutes bedside and on the inpatient unit today managing the neurocritical care of Marcia Kelley in relation to the issues listed in this note. Patient has very high risk of deterioration      Ann-Marie Cochran, FNP-BC

## 2017-03-29 NOTE — PLAN OF CARE
Problem: Goal Outcome Summary  Goal: Goal Outcome Summary  OT: Grooming/hygiene task completed -- items placed on L side, pt required max verbal cues to attend to L side and scan/turn head to L. Pt able to grasp comb/toothbrush with RUE and complete tasks with min A, cues for attending to L side of hair, brushed L side of mouth without cues. Pt participated in LUE neuro re-ed, poor control in L upper trapezius noted, completed AAROM with R hand assisting L hand for elbow flex/ext. Stable VS with activity.  OT recommendation:  ARU at discharge.

## 2017-03-29 NOTE — PROGRESS NOTES
ICU Multi-Disciplinary Note  Patient condition reviewed and discussed while on multidisciplinary rounds today.   The Critical Care service will continue to follow peripherally while patient is within the ICU. We are readily available should issues arise.  Please feel free to contact us for anything with which we may be of assistance.  Annelise Levin PA-C  Pager: [phone]

## 2017-03-29 NOTE — PROGRESS NOTES
Rusty Progress Note  Chart Reviewed, Pt discussed in Interdisciplinary Rounds.   Pt is anticipated to go to ARU. Family had been given a list for review 2 days ago.  Discharge anticipated in next day or two.    Intervention:   Pt's , Oj, contacted Sister Jona Bauer at [facility] in anticipation of discharge to inquire about bed availability. They do have available bed per Fernanda when RUSTY made follow up call. H&P, progress notes, MAR, therapy notes, imaging results and consults sent for assessment to [phone].    RUSTY made follow up call [facility] regarding assessment results. Voice message left asking for return call.    Plan:    Anticipated discharge placement: 1-2 days  Barriers: pt still on IV medication and awaiting PT recommendation.  Follow up plan: RUSTY continuing

## 2017-03-29 NOTE — PLAN OF CARE
Problem: Goal Outcome Summary  Goal: Goal Outcome Summary  Outcome: No Change  Remains lethargic but arouses to stimulation.  Left UE is flacid--w drift; LLE can move side to side but not lift off bed.  Follows all commands on right. Tongue is midline; facial droop on left.  Has visual field cut on the left.  Cannot do heel to shin on either LE.  UO good.  Has no appetite--dysphagia 1 and pt swallows thin liquids--needs much encouragement.  Family is at bedside.   They understand POC>

## 2017-03-30 ENCOUNTER — APPOINTMENT (OUTPATIENT)
Dept: SPEECH THERAPY | Facility: CLINIC | Age: 77
DRG: 061 | End: 2017-03-30
Payer: MEDICARE

## 2017-03-30 ENCOUNTER — APPOINTMENT (OUTPATIENT)
Dept: OCCUPATIONAL THERAPY | Facility: CLINIC | Age: 77
DRG: 061 | End: 2017-03-30
Payer: MEDICARE

## 2017-03-30 ENCOUNTER — APPOINTMENT (OUTPATIENT)
Dept: PHYSICAL THERAPY | Facility: CLINIC | Age: 77
DRG: 061 | End: 2017-03-30
Payer: MEDICARE

## 2017-03-30 LAB
ANION GAP SERPL CALCULATED.3IONS-SCNC: 8 MMOL/L (ref 3–14)
BUN SERPL-MCNC: 11 MG/DL (ref 7–30)
CALCIUM SERPL-MCNC: 7.7 MG/DL (ref 8.5–10.1)
CHLORIDE SERPL-SCNC: 107 MMOL/L (ref 94–109)
CO2 SERPL-SCNC: 26 MMOL/L (ref 20–32)
CREAT SERPL-MCNC: 0.48 MG/DL (ref 0.52–1.04)
ERYTHROCYTE [DISTWIDTH] IN BLOOD BY AUTOMATED COUNT: 13.2 % (ref 10–15)
GFR SERPL CREATININE-BSD FRML MDRD: ABNORMAL ML/MIN/1.7M2
GLUCOSE BLDC GLUCOMTR-MCNC: 102 MG/DL (ref 70–99)
GLUCOSE BLDC GLUCOMTR-MCNC: 103 MG/DL (ref 70–99)
GLUCOSE BLDC GLUCOMTR-MCNC: 108 MG/DL (ref 70–99)
GLUCOSE BLDC GLUCOMTR-MCNC: 109 MG/DL (ref 70–99)
GLUCOSE BLDC GLUCOMTR-MCNC: 147 MG/DL (ref 70–99)
GLUCOSE BLDC GLUCOMTR-MCNC: 98 MG/DL (ref 70–99)
GLUCOSE SERPL-MCNC: 114 MG/DL (ref 70–99)
HCT VFR BLD AUTO: 36.3 % (ref 35–47)
HGB BLD-MCNC: 12.1 G/DL (ref 11.7–15.7)
MAGNESIUM SERPL-MCNC: 2 MG/DL (ref 1.6–2.3)
MCH RBC QN AUTO: 31.2 PG (ref 26.5–33)
MCHC RBC AUTO-ENTMCNC: 33.3 G/DL (ref 31.5–36.5)
MCV RBC AUTO: 94 FL (ref 78–100)
PHOSPHATE SERPL-MCNC: 2.5 MG/DL (ref 2.5–4.5)
PLATELET # BLD AUTO: 150 10E9/L (ref 150–450)
POTASSIUM SERPL-SCNC: 2.8 MMOL/L (ref 3.4–5.3)
POTASSIUM SERPL-SCNC: 3.5 MMOL/L (ref 3.4–5.3)
RBC # BLD AUTO: 3.88 10E12/L (ref 3.8–5.2)
SODIUM SERPL-SCNC: 141 MMOL/L (ref 133–144)
WBC # BLD AUTO: 6.1 10E9/L (ref 4–11)

## 2017-03-30 PROCEDURE — 84100 ASSAY OF PHOSPHORUS: CPT | Performed by: INTERNAL MEDICINE

## 2017-03-30 PROCEDURE — 97530 THERAPEUTIC ACTIVITIES: CPT | Mod: GP

## 2017-03-30 PROCEDURE — 40000239 ZZH STATISTIC VAT ROUNDS

## 2017-03-30 PROCEDURE — 85027 COMPLETE CBC AUTOMATED: CPT | Performed by: INTERNAL MEDICINE

## 2017-03-30 PROCEDURE — 99232 SBSQ HOSP IP/OBS MODERATE 35: CPT | Performed by: HOSPITALIST

## 2017-03-30 PROCEDURE — 40000193 ZZH STATISTIC PT WARD VISIT

## 2017-03-30 PROCEDURE — 25000125 ZZHC RX 250: Performed by: HOSPITALIST

## 2017-03-30 PROCEDURE — 00000146 ZZHCL STATISTIC GLUCOSE BY METER IP

## 2017-03-30 PROCEDURE — 25000132 ZZH RX MED GY IP 250 OP 250 PS 637: Mod: GY | Performed by: NURSE PRACTITIONER

## 2017-03-30 PROCEDURE — 40000133 ZZH STATISTIC OT WARD VISIT

## 2017-03-30 PROCEDURE — 84132 ASSAY OF SERUM POTASSIUM: CPT | Performed by: HOSPITALIST

## 2017-03-30 PROCEDURE — 12000000 ZZH R&B MED SURG/OB

## 2017-03-30 PROCEDURE — 97110 THERAPEUTIC EXERCISES: CPT | Mod: GO

## 2017-03-30 PROCEDURE — 97535 SELF CARE MNGMENT TRAINING: CPT | Mod: GO

## 2017-03-30 PROCEDURE — A9270 NON-COVERED ITEM OR SERVICE: HCPCS | Mod: GY | Performed by: HOSPITALIST

## 2017-03-30 PROCEDURE — 83735 ASSAY OF MAGNESIUM: CPT | Performed by: INTERNAL MEDICINE

## 2017-03-30 PROCEDURE — 25000132 ZZH RX MED GY IP 250 OP 250 PS 637: Mod: GY | Performed by: HOSPITALIST

## 2017-03-30 PROCEDURE — 25000132 ZZH RX MED GY IP 250 OP 250 PS 637: Mod: GY | Performed by: PSYCHIATRY & NEUROLOGY

## 2017-03-30 PROCEDURE — 40000225 ZZH STATISTIC SLP WARD VISIT: Performed by: SPEECH-LANGUAGE PATHOLOGIST

## 2017-03-30 PROCEDURE — 80048 BASIC METABOLIC PNL TOTAL CA: CPT | Performed by: INTERNAL MEDICINE

## 2017-03-30 PROCEDURE — 92526 ORAL FUNCTION THERAPY: CPT | Mod: GN | Performed by: SPEECH-LANGUAGE PATHOLOGIST

## 2017-03-30 PROCEDURE — 40000915 ZZH STATISTIC SITTER, EVENING HOURS

## 2017-03-30 PROCEDURE — A9270 NON-COVERED ITEM OR SERVICE: HCPCS | Mod: GY | Performed by: NURSE PRACTITIONER

## 2017-03-30 PROCEDURE — 40000916 ZZH STATISTIC SITTER, NIGHT HOURS

## 2017-03-30 PROCEDURE — 40000914 ZZH STATISTIC SITTER, DAY HOURS

## 2017-03-30 PROCEDURE — A9270 NON-COVERED ITEM OR SERVICE: HCPCS | Mod: GY | Performed by: PSYCHIATRY & NEUROLOGY

## 2017-03-30 RX ORDER — CLONIDINE HYDROCHLORIDE 0.1 MG/1
0.1 TABLET ORAL EVERY 4 HOURS PRN
Status: DISCONTINUED | OUTPATIENT
Start: 2017-03-30 | End: 2017-03-30

## 2017-03-30 RX ORDER — POTASSIUM CHLORIDE 1500 MG/1
20 TABLET, EXTENDED RELEASE ORAL 2 TIMES DAILY
Status: DISCONTINUED | OUTPATIENT
Start: 2017-03-30 | End: 2017-03-30

## 2017-03-30 RX ORDER — CLONIDINE HYDROCHLORIDE 0.1 MG/1
.1-.2 TABLET ORAL EVERY 4 HOURS PRN
Status: DISCONTINUED | OUTPATIENT
Start: 2017-03-30 | End: 2017-03-31 | Stop reason: HOSPADM

## 2017-03-30 RX ORDER — POTASSIUM CHLORIDE 1.5 G/1.58G
20 POWDER, FOR SOLUTION ORAL DAILY
Status: DISCONTINUED | OUTPATIENT
Start: 2017-03-30 | End: 2017-03-31 | Stop reason: HOSPADM

## 2017-03-30 RX ORDER — AMLODIPINE BESYLATE 10 MG/1
10 TABLET ORAL DAILY
Status: DISCONTINUED | OUTPATIENT
Start: 2017-03-31 | End: 2017-03-31 | Stop reason: HOSPADM

## 2017-03-30 RX ORDER — SODIUM CHLORIDE 9 MG/ML
INJECTION, SOLUTION INTRAVENOUS CONTINUOUS
Status: DISCONTINUED | OUTPATIENT
Start: 2017-03-30 | End: 2017-03-30

## 2017-03-30 RX ORDER — AMLODIPINE BESYLATE 5 MG/1
5 TABLET ORAL ONCE
Status: COMPLETED | OUTPATIENT
Start: 2017-03-30 | End: 2017-03-30

## 2017-03-30 RX ORDER — POTASSIUM CHLORIDE 1500 MG/1
20 TABLET, EXTENDED RELEASE ORAL DAILY
Status: DISCONTINUED | OUTPATIENT
Start: 2017-03-30 | End: 2017-03-30

## 2017-03-30 RX ORDER — DEXTROSE MONOHYDRATE 50 MG/ML
INJECTION, SOLUTION INTRAVENOUS CONTINUOUS
Status: DISCONTINUED | OUTPATIENT
Start: 2017-03-30 | End: 2017-03-30

## 2017-03-30 RX ADMIN — VITAMIN D, TAB 1000IU (100/BT) 2000 UNITS: 25 TAB at 09:41

## 2017-03-30 RX ADMIN — Medication 100 MG: at 09:41

## 2017-03-30 RX ADMIN — POTASSIUM CHLORIDE 20 MEQ: 1.5 POWDER, FOR SOLUTION ORAL at 14:29

## 2017-03-30 RX ADMIN — AMLODIPINE BESYLATE 5 MG: 5 TABLET ORAL at 09:42

## 2017-03-30 RX ADMIN — POTASSIUM CHLORIDE 20 MEQ: 29.8 INJECTION, SOLUTION INTRAVENOUS at 11:12

## 2017-03-30 RX ADMIN — POTASSIUM CHLORIDE 20 MEQ: 29.8 INJECTION, SOLUTION INTRAVENOUS at 23:30

## 2017-03-30 RX ADMIN — POTASSIUM CHLORIDE 20 MEQ: 29.8 INJECTION, SOLUTION INTRAVENOUS at 13:34

## 2017-03-30 RX ADMIN — POTASSIUM CHLORIDE 20 MEQ: 29.8 INJECTION, SOLUTION INTRAVENOUS at 12:23

## 2017-03-30 RX ADMIN — AMLODIPINE BESYLATE 5 MG: 5 TABLET ORAL at 09:41

## 2017-03-30 RX ADMIN — POTASSIUM PHOSPHATE, MONOBASIC 500 MG: 500 TABLET, SOLUBLE ORAL at 14:29

## 2017-03-30 RX ADMIN — ASPIRIN 325 MG: 325 TABLET, DELAYED RELEASE ORAL at 09:41

## 2017-03-30 ASSESSMENT — VISUAL ACUITY
OU: NORMAL ACUITY;BLINKS TO THREAT
OU: NORMAL ACUITY
OU: NORMAL ACUITY;BLINKS TO THREAT

## 2017-03-30 NOTE — PLAN OF CARE
Problem: Goal Outcome Summary  Goal: Goal Outcome Summary  Outcome: No Change  Lethargic, oriented x4.  RUTH and LL hemiparesis, L field cut and L facial droop. VSS, Bps w/i parameters. Sitter at bedside.  Tele NSR. DD1 diet. Up with lift x2. Leung patent, one soft BM last evening.  Denies pain. Plan d/c to TCU pending.

## 2017-03-30 NOTE — PROGRESS NOTES
Pt medically stable, orders received to transfer patient out of ICU. Report called to NISHANT Cobb on station [id number]. Pt transferred to room 740 with cart of belongings.

## 2017-03-30 NOTE — PLAN OF CARE
Problem: Goal Outcome Summary  Goal: Goal Outcome Summary  Outcome: Therapy, progress towards functional goals is fair  PT- Per plan established by the Physical Therapist, according to functional mobility the discharge recommendation is ARU Pt sleeping soundly upon PT arrival. Pt's  present and able to rouse pt to participate. BP at rest 124/68. Supine to sit with HOB elevated and mod assist x 2. Sitting balance EOB with varying level of assist from min assist x 1 to max assist x 2. Pt with strong L lateral and posterior lean. Attempted to fasciliate core muscles for midline positioning. Pt fatigues quickly. Returned supine with max assist x 2 and dependent use of liko lift for repositioning in bed. All needs in reach and bed alarm on upon PT exit. O2 remained >90% on RA throughout session.

## 2017-03-30 NOTE — PROGRESS NOTES
"[facility]  Hospitalist Progress Note        Dipak Lina Nic, DO  03/30/2017        Interval History:      Discussed at length with patient , planning for rehab when appropriate.          Assessment and Plan:        Marcia Kelley is a 76 year old female with no known significant medical hx except for possible HTN and is on a baby aspirin daily who presents with sudden onset left sided weakness and collapse.       Acute right MCA territory stroke Associated with sudden onset left sided weakness, facial droop, slurred speech, confusion and collapsed. CTA as below. S/p iv tPA and and IR thrombectomy was attempted but was unsuccessful due to the anatomy of her prox R CCA with tortuosity and a significant 360 degree loop. S/p iv tPA. Appreciate Neurology consultation.  - Blood pressure control.   - Mannitol discontinued.   - Neurology following.   - Continue amlodipine.   - PRN clonidine.       Mechanical fall: On 3/24. Per notes, response called for fall.   - Imaging including CT head and neck as well as xrays negative.   - Up with assist, fall precautions.       Vitamin D deficiency: Returned low on 3/22.   - Vitamin D supplementation.       Possible HTN She denies any medical hx but her  reports a longstanding hx of HTN. She does not go to doctors as she thinks they are \"of no use\" according to her . BP mildly elevated her in the ER.   - Will monitor and treat as appropriate      DVT Prophylaxis: Pneumatic Compression Devices      Code: Full Code      Disposition: Plan for rehab in following days.                    Physical Exam:      Heart Rate: 59    Blood pressure 141/86, pulse 70, temperature 98.3  F (36.8  C), temperature source Oral, resp. rate 17, height 1.676 m (5' 6\"), weight 79.3 kg (174 lb 13.2 oz), SpO2 95 %.    Vitals:    03/27/17 0600 03/28/17 0630 03/29/17 0629   Weight: 78.6 kg (173 lb 4.5 oz) 79 kg (174 lb 2.6 oz) 79.3 kg (174 lb 13.2 oz)       Vital Sign " Ranges  Temperature Temp  Av.9  F (36.6  C)  Min: 97.4  F (36.3  C)  Max: 98.3  F (36.8  C)   Blood pressure Systolic (24hrs), Av , Min:126 , Max:173        Diastolic (24hrs), Av, Min:77, Max:98      Pulse No Data Recorded   Respirations Resp  Av.5  Min: 12  Max: 24   Pulse oximetry SpO2  Av.6 %  Min: 95 %  Max: 98 %     Vital Signs with Ranges  Temp:  [97.4  F (36.3  C)-98.3  F (36.8  C)] 98.3  F (36.8  C)  Heart Rate:  [54-69] 59  Resp:  [12-24] 17  BP: (126-173)/(77-98) 141/86  SpO2:  [95 %-98 %] 95 %    I/O Last 3 Shifts:   I/O last 3 completed shifts:  In: 1100 [P.O.:50; I.V.:1050]  Out: 2525 [Urine:2525]    I/O past 24 hours:     Intake/Output Summary (Last 24 hours) at 17 0817  Last data filed at 17 0500   Gross per 24 hour   Intake              900 ml   Output             2275 ml   Net            -1375 ml     GENERAL: NAD. More alert.   HEENT: Normocephalic. Nares normal.   LUNGS: No dyspnea at rest.   HEART: Extremities perfused.   EXTREMITIES: No LE edema noted.   NEUROLOGIC: Left sided paresis continued.          Prior to Admission Medications:        Prescriptions Prior to Admission   Medication Sig Dispense Refill Last Dose     ASPIRIN PO Take 81 mg by mouth At Bedtime    3/20/2017 at             Medications:        Current Facility-Administered Medications   Medication Last Rate     - MEDICATION INSTRUCTIONS -       NaCl 75 mL/hr at 17     - MEDICATION INSTRUCTIONS -       - MEDICATION INSTRUCTIONS -       Current Facility-Administered Medications   Medication Dose Route Frequency     aspirin EC  325 mg Oral Daily     amLODIPine  5 mg Oral Daily     sodium chloride (PF)  10 mL Intracatheter Q8H     cholecalciferol  2,000 Units Oral Daily     co-enzyme Q-10  100 mg Oral Daily     insulin aspart  1-3 Units Subcutaneous TID AC     insulin aspart  1-3 Units Subcutaneous At Bedtime     sodium chloride (PF)  10 mL Intravenous Once     sodium chloride (PF)  3  mL Intracatheter Q8H     Current Facility-Administered Medications   Medication Dose Route Frequency     potassium chloride  20-40 mEq Oral Q2H PRN     potassium chloride  20-40 mEq Oral or Feeding Tube Q2H PRN     potassium chloride  10 mEq Intravenous Q1H PRN     potassium chloride with lidocaine  10 mEq Intravenous Q1H PRN     potassium chloride  20 mEq Intravenous Q1H PRN     potassium phosphate (KPHOS) in D5W IV  15 mmol Intravenous Daily PRN     potassium phosphate (KPHOS) in D5W IV  20 mmol Intravenous Q6H PRN     potassium phosphate (KPHOS) in D5W IV  20 mmol Intravenous Q6H PRN     potassium phosphate (KPHOS) in D5W IV  25 mmol Intravenous Q8H PRN     lidocaine 4%   Topical Once PRN     heparin lock flush  2-5 mL Intracatheter Once PRN     sodium chloride (PF)  10-20 mL Intracatheter Q1H PRN     glucose  15-30 g Oral Q15 Min PRN    Or     dextrose  25-50 mL Intravenous Q15 Min PRN    Or     glucagon  1 mg Subcutaneous Q15 Min PRN     - MEDICATION INSTRUCTIONS -   Does not apply Continuous PRN     HOLD MEDICATION   Does not apply HOLD     metoclopramide  5 mg Oral Q6H PRN    Or     metoclopramide  5 mg Intravenous Q6H PRN     - MEDICATION INSTRUCTIONS -   Does not apply Continuous PRN     - MEDICATION INSTRUCTIONS -   Does not apply Continuous PRN     labetalol  10-20 mg Intravenous Q10 Min PRN     naloxone  0.1-0.4 mg Intravenous Q2 Min PRN     lidocaine  1 mL Other Q1H PRN     lidocaine 4%   Topical Q1H PRN     sodium chloride (PF)  3 mL Intracatheter Q1H PRN     magnesium sulfate  4 g Intravenous Q4H PRN     acetaminophen  650 mg Rectal Q4H PRN     senna-docusate  1-2 tablet Oral BID PRN     magnesium hydroxide  30 mL Oral Daily PRN     bisacodyl  10 mg Rectal Daily PRN     ondansetron  4 mg Oral Q6H PRN    Or     ondansetron  4 mg Intravenous Q6H PRN     prochlorperazine  5 mg Intravenous Q6H PRN    Or     prochlorperazine  5 mg Oral Q6H PRN    Or     prochlorperazine  12.5 mg Rectal Q12H PRN      hydrALAZINE  10-20 mg Intravenous Q30 Min PRN            Data:      Lab data reviewed.     Recent Labs  Lab 03/29/17  0420 03/28/17  1330 03/28/17  0445  03/27/17  0350 03/26/17  0353  03/25/17  0457  03/23/17  0935   HGB 11.5*  --  12.0  --   --   --   --  11.3*  < >  --    MCV 96  --  94  --   --   --   --  95  < >  --    *  --  94*  --   --   --   --  107*  < >  --    *  --  148*  --  146* 147*  --  148*  < >  --    POTASSIUM 3.3* 3.6 2.7*  < > 3.1* 3.3*  < > 3.4  < >  --    CHLORIDE 116*  --  115*  --  112* 113*  --  116*  < >  --    CO2 27  --  26  --  24 26  --  25  < >  --    BUN 15  --  16  --  15 14  --  14  < >  --    CR 0.53  --  0.50*  --  0.56 0.58  --  0.59  < >  --    ANIONGAP 5  --  7  --   --  8  --  7  < >  --    RADHA 7.7*  --  7.7*  --  7.9* 7.6*  --  7.5*  < >  --    GLC 98  --  104*  --  93 93  --  125*  < >  --    TROPI  --   --   --   --   --   --   --   --   --  0.064*   < > = values in this interval not displayed.        Imaging:      Imaging data reviewed.     Dr. Dipak Lucero D.O.  [facility]  Pager [phone]

## 2017-03-30 NOTE — PROGRESS NOTES
[facility]  Neuroscience and Spine [place]  Neurology Daily Note      Admission Date:3/21/2017   Date of service: 03/30/2017   Hospital Day: 10      Assessment & Plan   _______________________________  #. (I63.411) Cerebral infarction due to embolism of right middle cerebral artery (H) (primary encounter diagnosis)  --Stroke consistent with embolic event  --Likely undiagnosed atrial fibrillation  -----Continue close monitoring for atrial fibrillation  --Hemorrhagic conversion will prevent anticoagulation at this time.   ---started aspirin 325 mg daily, was on 81 mg PTA  --A19 5.9  --TSH normal  #. (G93.6) Cerebral edema (H)  --Related to large R MCA stroke  --Increased edema on CT from 3/23/17  -----repeat CT head with continued edema, stable  ------completed mannitol   #. (I51.7) Atrial dilatation, left  --Likely the sign of atrial fibrillation  #. (I10) Benign essential hypertension  --Keep BP <180 mm Hg  --Management per hospitalists.   #. (E55.9) Vitamin D deficiency  --level 16  --continue vitamin D3 2000U and CoQ10  #. (E78.2) Mixed hyperlipidemia  --  --Not contributory to this stroke  --Will not start statins at this time.  #. PT/OT/Speech  --continue evaluations  ----recommending ARU at DC  #. Nutrition  --Per speech therapy evaluation   #. DVT Prophylaxis  --Mechanical only due to hemorrhagic conversion    Code Status: Full Code    Disposition: pending    Interval History   _______________________________  Patient presented with sudden onset left sided weakness and collapse. Was shopping and while in the dressing room developed left sided weakness, facial droop, slurred speech, confusion and collapsed to the ground. Staff heard her fall and called 911 after finding her on the ground. Code stroke was called in the ED. CT head with contrast showed moderate to large right posterior division middle cerebral artery territory matched defect consistent with an infarct. CT angiogram of  head and neck showed a filling defect versus short segment high-grade stenosis involving the distal right M1 branch with poor filling of the right M2 branch. Finding is probably due to thromboembolus. IV tPA was administered and she went to IR for possible thrombectomy. 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 Subsequent MRI brain demonstrated large posterior right MCA infarct with small hemorrhagic transformation and mild midline shift due to cerebral edema.     Follows commands on the right and minimally able to move left LE. No movement in the left UE. Denies headache. Answers questions appropriately. Still lethargic.     Review of Systems   _______________________________  The Review of Systems is negative other than noted in the HPI  Physical Exam   _______________________________  Vitals: Temp: 98.3  F (36.8  C) Temp src: Oral BP: 141/86   Heart Rate: 59 Resp: 17 SpO2: 95 % O2 Device: Nasal cannula Oxygen Delivery: 2 LPM  Vital Signs with Ranges: Temp:  [97.4  F (36.3  C)-98.3  F (36.8  C)] 98.3  F (36.8  C)  Heart Rate:  [54-69] 59  Resp:  [12-24] 17  BP: (126-173)/(77-98) 141/86  SpO2:  [95 %-98 %] 95 %    General Appearance:  No acute distress  Neuro:       Mental Status Exam:    Lethargic, arouses to voice, oriented X3. Mild dysarthria, no clear aphasia. Mental status is mildly decreased       Cranial Nerves:   Pupils 3 mm, reactive. EOMI. Face sensation is normal. Left facial droop. Tongue and uvula are midline. Other CN are normal            Motor:   5/5 on the right, no movement LUE, 2/5 LLE. Tone and bulk are normal             Reflexes:  Normal DTR. Toes equivocal.        Sensory:  Left neglect                  Coordination:   Intact finger-to-nose on the right, unable to assess on the left due to weakness       Gait:  Unable to assess, up with lift   Cardiovascular: Regular rate and rhythm, no m/r/g  Lungs: Clear to auscultation  Abdomen: Soft, not  tender, not distended  Extremities: No clubbing, no cyanosis, no edema    Medications   _______________________________    NaCl       - MEDICATION INSTRUCTIONS -       - MEDICATION INSTRUCTIONS -       - MEDICATION INSTRUCTIONS -         aspirin EC  325 mg Oral Daily     amLODIPine  5 mg Oral Daily     sodium chloride (PF)  10 mL Intracatheter Q8H     cholecalciferol  2,000 Units Oral Daily     co-enzyme Q-10  100 mg Oral Daily     insulin aspart  1-3 Units Subcutaneous TID AC     insulin aspart  1-3 Units Subcutaneous At Bedtime     sodium chloride (PF)  10 mL Intravenous Once     sodium chloride (PF)  3 mL Intracatheter Q8H       Data   _______________________________      Lab Data:   All data was reviewed by me personally  CBC RESULTS:  Recent Labs   Lab Test  03/29/17   0420  03/28/17   0445  03/25/17   0457   WBC  6.4  6.2  6.4   RBC  3.66*  3.87  3.59*   HGB  11.5*  12.0  11.3*   HCT  35.0  36.5  34.1*   PLT  126*  94*  107*     Basic Metabolic Panel:  Recent Labs   Lab Test  03/29/17   0420  03/28/17   1330  03/28/17   0445   03/27/17   0350   NA  148*   --   148*   --   146*   POTASSIUM  3.3*  3.6  2.7*   < >  3.1*   CHLORIDE  116*   --   115*   --   112*   CO2  27   --   26   --   24   BUN  15   --   16   --   15   CR  0.53   --   0.50*   --   0.56   GLC  98   --   104*   --   93   RADHA  7.7*   --   7.7*   --   7.9*    < > = values in this interval not displayed.     Liver panel:  Recent Labs   Lab Test  03/22/17   0515   PROTTOTAL  5.9*   ALBUMIN  3.0*   BILITOTAL  0.4   ALKPHOS  67   AST  22   ALT  16     Coagulation  Recent Labs   Lab Test  03/22/17   0515  03/21/17   1327   INR  0.98  0.95   PTT   --   29      Lipid Profile:  Recent Labs   Lab Test  03/21/17   1327   CHOL  207*   HDL  73   LDL  108*   TRIG  130     Thyroid Panel:  Recent Labs   Lab Test  03/21/17   1327   TSH  1.89      Vitamin B12:   Recent Labs   Lab Test  03/21/17   1327   B12  323      Vitamin D level:   Recent Labs   Lab Test   03/21/17   1327   VITDT  16*     A1C:   Recent Labs   Lab Test  03/22/17   0515  03/21/17   1327   A1C  5.7  5.9     Troponin I:   Recent Labs   Lab Test  03/23/17   0935  03/22/17   0515  03/21/17   2215   TROPI  0.064*  0.031  <0.015     CRP inflammation:   Recent Labs   Lab Test  03/21/17   1327   CRP  <2.9     ESR:   Recent Labs   Lab Test  03/21/17   1327   SED  8            Cardiac US:   TTE 3/23/17:  Above the ST ridge, the aorta is 3.7 cm. as is the arch. The ascending aorta is borderline dilated. Left ventricular systolic function is normal. The visual ejection fraction is estimated at 55%. The left ventricle is mildly dilated. The left atrium is moderately dilated and the right atrium is mildly dilated. A contrast injection (Bubble Study) was performed that was negative for flow across the interatrial septum. Right ventricular systolic pressure is elevated, consistent with mild to moderate pulmonary hypertension. There is trace to mild mitral regurgitation and trace to mild aortic regurgitation. Contrast was used without apparent complications. Would consider a transesophageal echocardiogram if clinically indicated. The study was technically limited.       Imaging:   All imaging studies were reviewed personally  CT head 3/21/17:   --Minimal chronic changes. No evidence for intracranial hemorrhage or any acute process     CTA neck/head 3/21/17:  --Filling defect versus short segment high-grade stenosis involving the distal right M1 and proximal M2 branch with poor filling of a right M2 branch. Finding is probably due to a thromboembolus     CT perfusion 3/21/17:  --Moderate to large right posterior division middle cerebral artery territory matched defect consistent with infarct     Cerebral angiogram 3/21/17:  1. Complete occlusion of the posterior division of the right middle cerebral artery with clot at the M2 origin. The anterior division is widely patent, as is the M1 segment.  2. 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 As such, the longest available neuron Max guide catheter was only able to be advanced to the proximal aspect of the 360 degree loop when completely hubbed at the skin. Mechanical thrombectomy was, therefore, not possible, and was not performed.  3. Minor plaque in the right carotid bulb, but no significant right internal carotid origin stenosis by NASA criteria.     MRI brain 3/22/17:   1. Moderate to large acute ischemic right posterior division middle cerebral artery territory infarct with some petechial hemorrhage. Difficult to exclude macroscopic hemorrhage. If clinically indicated, CT without contrast might be helpful in further evaluation. There is some localized mass effect with some very minimal right-to-left midline shift.  2. Small old bilateral cerebellar infarcts. In addition, there is a questionable tiny acute ischemic infarct in the left cerebellum which could just be due to due to T2 shine through.     CT head 3/23/17:  --Evolving right middle cerebral artery distribution infarct. Small amount of petechial hemorrhage within the infarct is unchanged. Slight increase in the amount of midline shift.     CT head 3/24/17:  1. Evolving large area of infarction right hemisphere.  2. No hemorrhage.  3. Moderate mass effect as previously described with some midline shift to the left but no interval change.     CT cervical spine 3/24/17:  1. No fracture or acute abnormality.  2. Minimal degenerative change as described.     CT head 3/26/17:  --Evolving ischemic right middle cerebral artery territory infarct with 9 mm of right-to-left shift and minimal prominence of the temporal horn. The mass effect is minimally more prominent than the prior study.     CT head 3/28/17:  1. Evolving large right MCA territory infarct again noted. Edema within the infarct results in 0.9 cm of leftward midline shift of the septum  pellucidum which is unchanged.  2. No intracranial hemorrhage.  3. No new infarcts.      Ann-Marie Cochran, FNP-BC

## 2017-03-30 NOTE — PLAN OF CARE
Problem: Goal Outcome Summary  Goal: Goal Outcome Summary  OT: Pt completed hygiene task including washing, pt completed task, addressing the left side of face as well, 2 verbal cues for washing left body including LUE. Pt completed combing hair, with 2 verbal cues to comb left side. Set pt up with meal tray and facilitated scanning tasks to increase awareness to left side, pt required more than 3 verbal cues during this. OT recommendation: ARU at discharge.

## 2017-03-30 NOTE — PLAN OF CARE
Problem: Goal Outcome Summary  Goal: Goal Outcome Summary  SLP: Patient was seen for swallow treatment with her  and son present.  reporting patient not wanting to eat the pureed diet. She was given Cherios and demonstrated good mastication, oral clearing and laryngeal elevation. No overt Sx of aspiration noted. Vocal quality remained cleared. Tolerated few sips of water without overt Sx of aspiration. Patient's barrier to safe swallowing at this time is overall alertness. Recommend: 1. Advance diet to a Dysphagia Diet level 3 with thin liquids. 2. Patient needs to be fully alert when eating/drinking, small bites/sips, alternate liquids/solids as needed. 3. SLP will f/u to insure diet tolerance on 3/31/17. 4. Discharge to ARU when stable. Patient has a good family support.

## 2017-03-30 NOTE — PROGRESS NOTES
Jose M Progress Note  Chart Reviewed, Pt discussed in Interdisciplinary Rounds.   Pt continues to be assessed by [facility] for possible admission Friday.    Intervention:   JOSE M spoke with Lyubov at [facility] at [facility] requesting therapy notes from yesterday afternoon. Notes faxed typ [phone]. Lyubov has indicated that they are able to access pt's chart via care everywhere. They cannot accept pt today due to IV lmed given last evening. They need to wait 24 hours. Pt has had a sitter as more of a courtesy while here on the unit due to a fall previously on ICU. JOSE M spoke with Lyubov regarding this and she indicates that [facility] is still able to accept pt for admission tomorrow and will reassess need once pt arrives.  will be with pt on admission and family informed of this reassessment at time of admission.    Anticipate admission in the am and [facility] requesting transport at 11:00am. SW ordered stretcher transport for 11:00am . Pt is unable to sit up unattended at this time due to weakness and lack of core strength.    Team Members notified:   CC, RN, [facility], family, physician    Plan: discharge   Anticipated discharge placement: [facility]  Barriers: none  Follow up plan: JOSE M to confirm bed and complete PCS for transport.

## 2017-03-30 NOTE — PLAN OF CARE
Problem: Goal Outcome Summary  Goal: Goal Outcome Summary  Outcome: Improving  Ox4, lethargic. Neuros L pupil>right, L facial droop, LUE hemiplegia, LLE hemiparesis, no sensation on L side. Pt weaned to RA, VSS. Tele SR with BBB. DD3 diet with thin liquids. Needs assistance and encouragement to eat. T/r q2h with assist of 2. Pt refusing lift, using bedpan. Incontinent of bowel. DTV, bladder scan at 1400 was 280. Denies pain. K replaced, recheck at 1630. Plan TCU tomorrow.

## 2017-03-30 NOTE — PROGRESS NOTES
CLINICAL NUTRITION SERVICES - REASSESSMENT NOTE      RECOMMENDATIONS FOR MD/PROVIDER TO ORDER:   Recommend consider appetite stimulant (MD informed)   Recommendations Ordered by Registered Dietitian (RD):   Calorie Count x 3 days (3/30-4/1)   Future/Additional Recommendations:   Recommend consider TF if oral intake not improved       EVALUATION OF PROGRESS TOWARD GOALS   Diet:  DD1 with thin liquids + Ensure Clear with meals (started 3/28) and [place] Smoothie BID btw meals at 10am and 2pm    Intake/Tolerance:    Limited nutritional intake since admission x 9 days.  Spoke with  this morning.  Yesterday she consumed 2 Ensure Clear and 1 applesauce only = 460 kcals (30% energy needs), 15 gm pro (20% protein needs).  He thinks she can swallow fine but her main problems are extreme lethargy (only fully alert for 1 hr in am and 1 hr at HS) and absolutely no appetite.  She likes the Ensure Clear and has not received the Smoothie since transferred from ICU --> 73.  Will have Nutrition Associate check on this.  Will send one now per  request.    Wt:  79.3 kg (3/29) - down 2.3 from admission.  BMI:  28.2 (134% IBW).  3/30:  K 2.8 (L) - Note that K has been running persistently low.      Dosing Weight: 64 kg (adjusted for overweight)      ASSESSED NUTRITION NEEDS:  Estimated Energy Needs: 4944-7375 kcals (25-30 Kcal/Kg)  Justification: overweight  Estimated Protein Needs: 77-96 grams protein (1.2-1.5 g pro/Kg)  Justification: hypercatabolism with acute illness    NEW FINDINGS:   Continues Vit D and Co-Q for supplementation.  Mannitol off.  SLP working with pt and will see today for possible upgrade in die today.  Historically, pt has not been happy with the pureed diet consistency.  IVF D/Cd today.    Previous Goals (3/27):   Pt will consume > 75% meals and > 50% supplements in 3-4 days.  Evaluation: Not met    Previous Nutrition Diagnosis (3/27):   Inadequate oral intake related to dysphagia, dislike of  pureed foods, decreased appetite, and lethargy as evidenced by limited nutritional intake x 6 days with 4% weight loss.  Evaluation: No change      CURRENT NUTRITION DIAGNOSIS  Inadequate oral intake related to dysphagia, dislike of pureeds foods, decreased appetite, and lethargy as evidenced by limited nutritional intake x 9 days.    INTERVENTIONS  Recommendations / Nutrition Prescription  ADAT per SLP  Continue supplements as above  Calorie Count x 3 days (3/30-4/1)    Recommend consider appetite stimulant    Recommend consider TF if oral intake not improved    Implementation  Collaboration and Referral of Nutrition care - Discussed with SLP, bedside RN, and  Oj.  Later discussed with Dr. Lucero, who will order appetite stimulant today.      Goals  Pt will meet > 2/3 estimated needs via oral intake and TF will be addressed if continued inadequate oral intake.      MONITORING AND EVALUATION:  Progress towards goals will be monitored and evaluated per protocol and Practice Guidelines    Olena Tran, RD, LD, CNSC

## 2017-03-30 NOTE — PROGRESS NOTES
X-Cover:    Called to transfer pt out of ICU due to staffing issues.  Pt medically stable to transfer to .

## 2017-03-30 NOTE — PROVIDER NOTIFICATION
[facility]ONE[facility] page to MD: Transport at 11am tomorrow, need d/c orders and d/c summary by then.  Also, does not need to be sitter free for [facility] to accept her tomorrow

## 2017-03-31 ENCOUNTER — APPOINTMENT (OUTPATIENT)
Dept: SPEECH THERAPY | Facility: CLINIC | Age: 77
DRG: 061 | End: 2017-03-31
Payer: MEDICARE

## 2017-03-31 VITALS
DIASTOLIC BLOOD PRESSURE: 74 MMHG | HEIGHT: 66 IN | TEMPERATURE: 98 F | HEART RATE: 85 BPM | BODY MASS INDEX: 27.25 KG/M2 | RESPIRATION RATE: 16 BRPM | SYSTOLIC BLOOD PRESSURE: 137 MMHG | WEIGHT: 169.53 LBS | OXYGEN SATURATION: 90 %

## 2017-03-31 LAB
ANION GAP SERPL CALCULATED.3IONS-SCNC: 8 MMOL/L (ref 3–14)
BUN SERPL-MCNC: 15 MG/DL (ref 7–30)
CALCIUM SERPL-MCNC: 7.8 MG/DL (ref 8.5–10.1)
CHLORIDE SERPL-SCNC: 108 MMOL/L (ref 94–109)
CO2 SERPL-SCNC: 26 MMOL/L (ref 20–32)
CREAT SERPL-MCNC: 0.56 MG/DL (ref 0.52–1.04)
ERYTHROCYTE [DISTWIDTH] IN BLOOD BY AUTOMATED COUNT: 13.1 % (ref 10–15)
GFR SERPL CREATININE-BSD FRML MDRD: ABNORMAL ML/MIN/1.7M2
GLUCOSE BLDC GLUCOMTR-MCNC: 91 MG/DL (ref 70–99)
GLUCOSE BLDC GLUCOMTR-MCNC: 99 MG/DL (ref 70–99)
GLUCOSE SERPL-MCNC: 95 MG/DL (ref 70–99)
HCT VFR BLD AUTO: 35 % (ref 35–47)
HGB BLD-MCNC: 11.9 G/DL (ref 11.7–15.7)
MCH RBC QN AUTO: 31.6 PG (ref 26.5–33)
MCHC RBC AUTO-ENTMCNC: 34 G/DL (ref 31.5–36.5)
MCV RBC AUTO: 93 FL (ref 78–100)
PLATELET # BLD AUTO: 144 10E9/L (ref 150–450)
POTASSIUM SERPL-SCNC: 3.4 MMOL/L (ref 3.4–5.3)
RBC # BLD AUTO: 3.77 10E12/L (ref 3.8–5.2)
SODIUM SERPL-SCNC: 142 MMOL/L (ref 133–144)
WBC # BLD AUTO: 5.7 10E9/L (ref 4–11)

## 2017-03-31 PROCEDURE — 25000125 ZZHC RX 250: Performed by: HOSPITALIST

## 2017-03-31 PROCEDURE — 25000132 ZZH RX MED GY IP 250 OP 250 PS 637: Mod: GY | Performed by: HOSPITALIST

## 2017-03-31 PROCEDURE — 85027 COMPLETE CBC AUTOMATED: CPT | Performed by: HOSPITALIST

## 2017-03-31 PROCEDURE — A9270 NON-COVERED ITEM OR SERVICE: HCPCS | Mod: GY | Performed by: HOSPITALIST

## 2017-03-31 PROCEDURE — 25000132 ZZH RX MED GY IP 250 OP 250 PS 637: Mod: GY | Performed by: NURSE PRACTITIONER

## 2017-03-31 PROCEDURE — 99239 HOSP IP/OBS DSCHRG MGMT >30: CPT | Performed by: HOSPITALIST

## 2017-03-31 PROCEDURE — 40000225 ZZH STATISTIC SLP WARD VISIT: Performed by: SPEECH-LANGUAGE PATHOLOGIST

## 2017-03-31 PROCEDURE — A9270 NON-COVERED ITEM OR SERVICE: HCPCS | Mod: GY | Performed by: NURSE PRACTITIONER

## 2017-03-31 PROCEDURE — 99211 OFF/OP EST MAY X REQ PHY/QHP: CPT

## 2017-03-31 PROCEDURE — 25000132 ZZH RX MED GY IP 250 OP 250 PS 637: Mod: GY | Performed by: PSYCHIATRY & NEUROLOGY

## 2017-03-31 PROCEDURE — 92526 ORAL FUNCTION THERAPY: CPT | Mod: GN | Performed by: SPEECH-LANGUAGE PATHOLOGIST

## 2017-03-31 PROCEDURE — 80048 BASIC METABOLIC PNL TOTAL CA: CPT | Performed by: HOSPITALIST

## 2017-03-31 PROCEDURE — 00000146 ZZHCL STATISTIC GLUCOSE BY METER IP

## 2017-03-31 PROCEDURE — A9270 NON-COVERED ITEM OR SERVICE: HCPCS | Mod: GY | Performed by: PSYCHIATRY & NEUROLOGY

## 2017-03-31 RX ORDER — AMLODIPINE BESYLATE 10 MG/1
10 TABLET ORAL DAILY
Qty: 30 TABLET | DISCHARGE
Start: 2017-03-31

## 2017-03-31 RX ORDER — CLONIDINE HYDROCHLORIDE 0.1 MG/1
0.1 TABLET ORAL EVERY 4 HOURS PRN
Qty: 30 TABLET | DISCHARGE
Start: 2017-03-31

## 2017-03-31 RX ORDER — AMOXICILLIN 250 MG
1-2 CAPSULE ORAL 2 TIMES DAILY PRN
Qty: 100 TABLET | DISCHARGE
Start: 2017-03-31

## 2017-03-31 RX ORDER — ASPIRIN 325 MG
325 TABLET, DELAYED RELEASE (ENTERIC COATED) ORAL DAILY
Qty: 40 TABLET | DISCHARGE
Start: 2017-03-31

## 2017-03-31 RX ORDER — UBIDECARENONE 100 MG
100 CAPSULE ORAL DAILY
Refills: 0 | DISCHARGE
Start: 2017-03-31

## 2017-03-31 RX ADMIN — Medication 100 MG: at 09:02

## 2017-03-31 RX ADMIN — POTASSIUM CHLORIDE 20 MEQ: 29.8 INJECTION, SOLUTION INTRAVENOUS at 09:02

## 2017-03-31 RX ADMIN — POTASSIUM CHLORIDE 20 MEQ: 1.5 POWDER, FOR SOLUTION ORAL at 09:02

## 2017-03-31 RX ADMIN — POTASSIUM PHOSPHATE, MONOBASIC 500 MG: 500 TABLET, SOLUBLE ORAL at 09:02

## 2017-03-31 RX ADMIN — VITAMIN D, TAB 1000IU (100/BT) 2000 UNITS: 25 TAB at 09:02

## 2017-03-31 RX ADMIN — AMLODIPINE BESYLATE 10 MG: 10 TABLET ORAL at 09:02

## 2017-03-31 RX ADMIN — ASPIRIN 325 MG: 325 TABLET, DELAYED RELEASE ORAL at 09:02

## 2017-03-31 ASSESSMENT — VISUAL ACUITY
OU: NORMAL ACUITY
OU: NORMAL ACUITY

## 2017-03-31 NOTE — PLAN OF CARE
Problem: Goal Outcome Summary  Goal: Goal Outcome Summary  Outcome: No Change  Lethargic, seems difficult to motivate to perform commands, oriented x4. LUE hemiplegia, LLE hemiparesis, L facial droop and field cut. VSS. Tele NSR. DD3 diet, little appetite. Up with strong 2, pt prefers bedpan and refuses lift to BSC, incontinent B/B. Denies pain Plan D/C to TCU later this AM.

## 2017-03-31 NOTE — PROGRESS NOTES
CALORIE COUNT      Approximate Oral Intake for:   March 30   Calories:  215  Protein:  10 gm    Number of Meals Recorded:  0  - pt's  provided the information    Estimated Needs:    Calories: 9656-0976  Protein: 75-95  gm    Summary:   Intake is very poor. Discussed appetite stimulant with Dr. Lucero.    Niya Bose, RD  Pager [phone] (M-F)            [phone] ([facility]E & Hol)

## 2017-03-31 NOTE — DISCHARGE INSTRUCTIONS
Your risk factors for stroke or TIA (transient ischemic attack):    Your Risk Factors Your Results Normal Ranges   High blood pressure BP Readings from Last 1 Encounters:   03/31/17 137/74    Less than 120/80   Cholesterol              Total Lab Results   Component Value Date    CHOL 207 03/21/2017      Less than 150    Triglycerides   Lab Results   Component Value Date    TRIG 130 03/21/2017    Less than 150   LDL Lab Results   Component Value Date     03/21/2017       Less than 70   HDL Lab Results   Component Value Date    HDL 73 03/21/2017            Greater than 40 (men)  Greater than 50 (women)   Diabetes   Recent Labs  Lab 03/31/17  0630   GLC 95    Fasting blood glucose    Smoking/tobacco use  Quit smoking and tobacco   Overweight  Lose 1-2 pounds a week   Lack of exercise  30 minutes moderate activity each day   Other risk factors include carotid (neck) artery disease, atrial fibrillation and stress. You may be on new medicine to treat high blood pressure, cholesterol, diabetes or atrial fibrillation.    Understanding Stroke Booklet given to patient. Please refer to booklet for further information.    Stroke warning signs and symptoms - CALL 911 right away for:  - Sudden numbness or weakness in the face, arm or leg (often on one side of the body).  - Sudden confusion or trouble understanding what is going on.  - Sudden blurred or decreased vision in one or both eyes.  - Sudden trouble speaking, loss of balance, dizziness or problems with coordination.  - Sudden, severe headache for no reason.  - Fainting or seizures.  - Symptoms may go away then come back suddenly.    Neurology would like you to wear a cardionet after Rehab to monitor for atrial fibrillation.   Follow-up with the [facility] in 1 month with Dr. العراقي and Ann-Marie Cochran. Call [phone] to make an appointment.   Address: [address] [place] [address] [address], [place] [address]

## 2017-03-31 NOTE — PROGRESS NOTES
[facility]  Neuroscience and Sp[facility] [place]  STROKE WORKUP SUMMARY      Date of Admission: 3/21/2017    Hospital Day: 11        STROKE SUMMARY:    Type of Stroke:  --large right MCA stroke  Risk factors:  --suspect cardioembolic, HTN, mild hyperlipidemia  Recommendations:  --Activity: Ad karyna  --Diet: per recommendations of speech  --continue aspirin 325 mg daily  --continue vitamin D3 2000 units and CoQ10 100 mg daily  --will not start statins at this time  --cardionet after rehab to continue monitoring for atrial fibrillation  Follow up:  --Dr. العراقي / Ann-Marie Cochran NP in [facility] in 4 weeks with MRI brain prior       Interval History:   Patient presented with sudden onset left sided weakness and collapse. Was shopping and while in the dressing room developed left sided weakness, facial droop, slurred speech, confusion and collapsed to the ground. Staff heard her fall and called 911 after finding her on the ground. Code stroke was called in the ED. CT head with contrast showed moderate to large right posterior division middle cerebral artery territory matched defect consistent with an infarct. CT angiogram of head and neck showed a filling defect versus short segment high-grade stenosis involving the distal right M1 branch with poor filling of the right M2 branch. Finding is probably due to thromboembolus. IV tPA was administered and she went to IR for possible thrombectomy. 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 Subsequent MRI brain demonstrated large posterior right MCA infarct with small hemorrhagic transformation and mild midline shift due to cerebral edema.      Follows commands on the right and minimally able to move left LE. No movement in the left UE. Denies headache. Answers questions appropriately. Still lethargic.     Patient achieved maximal benefit from this hospitalization        MEDICATIONS:           Medications Prior to Admission:     Prescriptions Prior to Admission   Medication Sig Dispense Refill Last Dose     [DISCONTINUED] ASPIRIN PO Take 81 mg by mouth At Bedtime    3/20/2017 at hs             Discharge Medications:     Current Discharge Medication List      START taking these medications    Details   co-enzyme Q-10 100 MG CAPS capsule Take 1 capsule (100 mg) by mouth daily  Refills: 0    Associated Diagnoses: Acute ischemic stroke (H)      cloNIDine (CATAPRES) 0.1 MG tablet Take 1 tablet (0.1 mg) by mouth every 4 hours as needed (SBP > 160)  Qty: 30 tablet    Associated Diagnoses: Acute ischemic stroke (H)      amLODIPine (NORVASC) 10 MG tablet Take 1 tablet (10 mg) by mouth daily  Qty: 30 tablet    Associated Diagnoses: Acute ischemic stroke (H)      senna-docusate (SENOKOT-S;PERICOLACE) 8.6-50 MG per tablet Take 1-2 tablets by mouth 2 times daily as needed (constipation )  Qty: 100 tablet    Associated Diagnoses: Acute ischemic stroke (H)      potassium phosphate, monobasic, (K-PHOS) 500 MG tablet Take 1 tablet (500 mg) by mouth daily    Associated Diagnoses: Acute ischemic stroke (H)      cholecalciferol 2000 UNITS tablet Take 2,000 Units by mouth daily  Qty: 30 tablet    Associated Diagnoses: Acute ischemic stroke (H)         CONTINUE these medications which have CHANGED    Details   aspirin  MG EC tablet Take 1 tablet (325 mg) by mouth daily  Qty: 40 tablet    Associated Diagnoses: Acute ischemic stroke (H)                Hospital Problems:   #. (I63.411) Cerebral infarction due to embolism of right middle cerebral artery (H) (primary encounter diagnosis)  --Stroke consistent with embolic event  --Likely undiagnosed atrial fibrillation  -----Continue close monitoring for atrial fibrillation  --Hemorrhagic conversion will prevent anticoagulation at this time.   ---started aspirin 325 mg daily, was on 81 mg PTA  --A19 5.9  --TSH normal  #. (G93.6) Cerebral edema (H)  --Related to large R MCA  "stroke  --Increased edema on CT from 3/23/17  -----repeat CT head with continued edema, stable  ------completed mannitol   #. (I51.7) Atrial dilatation, left  --Likely the sign of atrial fibrillation  #. (I10) Benign essential hypertension  --Keep BP <180 mm Hg  --Management per hospitalists.   #. (E55.9) Vitamin D deficiency  --level 16  --continue vitamin D3 2000U and CoQ10  #. (E78.2) Mixed hyperlipidemia  --  --Not contributory to this stroke  --Will not start statins at this time.  #. PT/OT/Speech  --continue evaluations  ----recommending ARU at DC  #. Nutrition  --Per speech therapy evaluation   #. DVT Prophylaxis  --Mechanical only due to hemorrhagic conversion      Examination:   169 lbs 8.54 oz  5' 6\"  Neurological Examination upon discharge:  Neuro:       Mental Status Exam:   Lethargic, arouses to voice, oriented X3. Mild dysarthria, no clear aphasia. Mental status is mildly decreased       Cranial Nerves:  Pupils 3 mm, reactive. EOMI. Face sensation is normal. Left facial droop. Tongue and uvula are midline. Other CN are normal           Motor:  5/5 on the right, no movement LUE, 2/5 LLE. Tone and bulk are normal         Reflexes:  Normal DTR. Toes equivocal.        Sensory:  Left neglect                  Coordination:   Intact finger-to-nose on the right, unable to assess on the left due to weakness       Gait:  Up with lift/assist           Data:   Laboratory Data:  CBC RESULTS:     Recent Labs  Lab 03/31/17  0630 03/30/17  0900 03/29/17  0420   WBC 5.7 6.1 6.4   RBC 3.77* 3.88 3.66*   HGB 11.9 12.1 11.5*   HCT 35.0 36.3 35.0   * 150 126*     Basic Metabolic Panel:    Recent Labs  Lab 03/31/17  0630 03/30/17  1810 03/30/17  0900 03/29/17  0420     --  141 148*   POTASSIUM 3.4 3.5 2.8* 3.3*   CHLORIDE 108  --  107 116*   CO2 26  --  26 27   BUN 15  --  11 15   CR 0.56  --  0.48* 0.53   GLC 95  --  114* 98   RADHA 7.8*  --  7.7* 7.7*     Lipid Profile:  Recent Labs   Lab Test  " 03/21/17   1327   CHOL  207*   HDL  73   LDL  108*   TRIG  130          Cardiac Studies:   TTE 3/23/17:  Above the ST ridge, the aorta is 3.7 cm. as is the arch. The ascending aorta is borderline dilated. Left ventricular systolic function is normal. The visual ejection fraction is estimated at 55%. The left ventricle is mildly dilated. The left atrium is moderately dilated and the right atrium is mildly dilated. A contrast injection (Bubble Study) was performed that was negative for flow across the interatrial septum. Right ventricular systolic pressure is elevated, consistent with mild to moderate pulmonary hypertension. There is trace to mild mitral regurgitation and trace to mild aortic regurgitation. Contrast was used without apparent complications. Would consider a transesophageal echocardiogram if clinically indicated. The study was technically limited.       Imaging:   CT head 3/21/17:   --Minimal chronic changes. No evidence for intracranial hemorrhage or any acute process      CTA neck/head 3/21/17:  --Filling defect versus short segment high-grade stenosis involving the distal right M1 and proximal M2 branch with poor filling of a right M2 branch. Finding is probably due to a thromboembolus      CT perfusion 3/21/17:  --Moderate to large right posterior division middle cerebral artery territory matched defect consistent with infarct      Cerebral angiogram 3/21/17:  1. Complete occlusion of the posterior division of the right middle cerebral artery with clot at the M2 origin. The anterior division is widely patent, as is the M1 segment.  2. 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 As such, the longest available neuron Max guide catheter was only able to be advanced to the proximal aspect of the 360 degree loop when completely hubbed at the skin. Mechanical thrombectomy was, therefore, not possible, and was not performed.  3. Minor  plaque in the right carotid bulb, but no significant right internal carotid origin stenosis by NASA criteria.      MRI brain 3/22/17:   1. Moderate to large acute ischemic right posterior division middle cerebral artery territory infarct with some petechial hemorrhage. Difficult to exclude macroscopic hemorrhage. If clinically indicated, CT without contrast might be helpful in further evaluation. There is some localized mass effect with some very minimal right-to-left midline shift.  2. Small old bilateral cerebellar infarcts. In addition, there is a questionable tiny acute ischemic infarct in the left cerebellum which could just be due to due to T2 shine through.      CT head 3/23/17:  --Evolving right middle cerebral artery distribution infarct. Small amount of petechial hemorrhage within the infarct is unchanged. Slight increase in the amount of midline shift.      CT head 3/24/17:  1. Evolving large area of infarction right hemisphere.  2. No hemorrhage.  3. Moderate mass effect as previously described with some midline shift to the left but no interval change.      CT cervical spine 3/24/17:  1. No fracture or acute abnormality.  2. Minimal degenerative change as described.      CT head 3/26/17:  --Evolving ischemic right middle cerebral artery territory infarct with 9 mm of right-to-left shift and minimal prominence of the temporal horn. The mass effect is minimally more prominent than the prior study.      CT head 3/28/17:  1. Evolving large right MCA territory infarct again noted. Edema within the infarct results in 0.9 cm of leftward midline shift of the septum pellucidum which is unchanged.  2. No intracranial hemorrhage.  3. No new infarcts.       Education:   Reviewed written information on the followin. Patient was educated on the stroke warning signs  2. We discussed medications prescribed at discharge  3. We discussed stroke risk factors that are attributable to patient  4. We discussed  importance of activation of emergency medical system in case of recurrent symptoms.   5. We discussed follow up for stroke.    Patient understood educational information and asked appropriate questions.        Time:   Time Spend: over 45 minutes on discharge    ----------------------------------------------------------------------  Ann-Marie Cochran, [facility]    Davy العراقي MD, PhD, FAHA  Director of Stroke Center  [facility]

## 2017-03-31 NOTE — PLAN OF CARE
Problem: Goal Outcome Summary  Goal: Goal Outcome Summary  Occupational Therapy Discharge Summary     Reason for therapy discharge:    Discharged to acute rehabilitation facility.     Progress towards therapy goal(s). See goals on Care Plan in [facility] electronic health record for goal details.  Goals not met.  Barriers to achieving goals:   discharge from facility.     Therapy recommendation(s):    Recommend ARU for I/ADLs

## 2017-03-31 NOTE — PROVIDER NOTIFICATION
"Text page to MD, \"FYI: pt K 3.4 this am, would you still like pt on high K replacement protocol? Also pt will need orders to d/c PICC before 1100 d/c. Thanks!\"  "

## 2017-03-31 NOTE — PROGRESS NOTES
Jose M Progress Note late entry  Chart Reviewed, Pt discussed in Interdisciplinary Rounds.   [facility]  Discharge orders and summary are completed in the chart.    Intervention:   Jose M called and confirmed bed and discharge plan with Fernanda at [facility]. She requested discharge orders and MAR is a discharge packet. HUC supplied at discharge. JOSE M verified with pt's  and daughter that facility will reassess need for sitter on admission.  felt that it would not be necessary but will follow up with facility at admission. Room number and parking information given to family.  Jose M completed and faxed PAS form and placed on chart.    Plan: Pt discharged per plan to [facility] via [facility] stretcher at 11:15 am..

## 2017-03-31 NOTE — PLAN OF CARE
Problem: Goal Outcome Summary  Goal: Goal Outcome Summary  SLP: Swallow tx completed this AM to assess appropriateness for diet advancement.  present and participatory. Pt assessed with regular textures and thin liquids via cup and straw. Mastication adequate with no oral residue. Swallow response prompt with consistent hyolaryngeal elevation. No overt s/sx of aspiration. Pt and  participated in education re: recommended diet, POC, and swallowing precautions. Denied further questions. Lack of appetite remains pt's greatest barrier for eating at this time. Recommend upgrade to regular diet, continue thin liquids. Swallow precautions include upright and fully alert, small bites/sips, straws okay, alternate solids/liquids as needed, regular oral cares. Plan is for pt to discharge today. Recommend ongoing SLP services at ARU.         Speech Language Therapy Discharge Summary     Reason for therapy discharge:    Discharged to acute rehabilitation facility.     Progress towards therapy goal(s). See goals on Care Plan in [facility] electronic health record for goal details.  Goals partially met.  Barriers to achieving goals:   discharge from facility.     Therapy recommendation(s):    Continued therapy is recommended.  Rationale/Recommendations:  Recommend ongoing SLP services to ensure tolerance of regular diet and thin liquids. Also recommend further assess for cognitive-communication needs.

## 2017-03-31 NOTE — PLAN OF CARE
Problem: Goal Outcome Summary  Goal: Goal Outcome Summary  Outcome: Adequate for Discharge Date Met:  03/31/17  A&O. Neuros L field cut and neglect, LUE hemiplegia, LLE hemiparesis, no sensation to LUE/LLE, L pupil> R pupil, both reactive. Exit NIH score of 17. VSS. Tele NSR. Regular diet, poor appetite. K was 3.4, replaced, recheck in AM. Pt t/r q2h with assist of 2, refusing lift. Pt incontinent of B&B, RN encouraged use of bedpan or commode. Denies pain. Plan d/c to [facility] at 1100 with HE transport.

## 2017-03-31 NOTE — PLAN OF CARE
Problem: Goal Outcome Summary  Goal: Goal Outcome Summary  PT: Pt discharged to ARU prior to being seen by PT 3/31.     PT goals not met.

## 2017-03-31 NOTE — DISCHARGE SUMMARY
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 The patient was out shopping at [employer] for an upcoming wedding and was in the dressing room when she experienced left sided weakness, facial droop, slurred speech, confusion and collapsed to the ground. Staff heard her fall and when she was found on the ground 911 was called. No seizure like activity was reported. On EMS evaluation she was responsive but confused and had left arm paresis. Here in the ER her symptoms persisted and she remained confused thinking she was still at [facility] and was showing left sided neglect. The time of onset was about 12:50pm. Code stroke was called at 1:25 pm. Evaluation in the emergency department shows an elderly female with a blood pressure 166/82, pulse of 71, afebrile and saturating at 97% on room air. Routine labs showed an unremarkable BMP, glucose of 167 and an unremarkable CBC. INR 0.95. CT scan of her head without contrast was negative for any acute process. CT head with contrast showed moderate to large right posterior division middle cerebral artery territory matched defect consistent with an infarct. CT angiogram of head and neck showed a filling defect versus short segment high-grade stenosis involving the distal right M1 branch with poor filling of the right M2 branch. Finding is  probably due to thromboembolus. She was evaluated by neurology in the emergency department was within the window for IV TPA. There was no family on site but her  was called and the risk and benefits were discussed with him over the phone and he consented to IV TPA. IV TPA was initiated and she was sent to interventional radiology for possible thrombectomy. She will be transferred to the ICU after IR. On my exam in ICU she has persistent LUE weakness with reduced  strength but is able to  most of her arm. No LLE weakness noted. No facial droop but does have some very mild slurred speech but is mildly sedated from her procedure so unclear if related to her cva.     Hospital Course   Marcia Kelley was admitted on 3/21/2017.  The following problems were addressed during her hospitalization:    Marcia Kelley is a 76 year old female with no known significant medical hx except for possible HTN and is on a baby aspirin daily who presents with sudden onset left sided weakness and collapse.       Acute right MCA territory stroke Associated with sudden onset left sided weakness, facial droop, slurred speech, confusion and collapsed. CTA as below. S/p iv tPA and and IR thrombectomy was attempted but was unsuccessful due to the anatomy of her prox R CCA with tortuosity and a significant 360 degree loop. S/p iv tPA. Appreciate Neurology consultation.  - Blood pressure control.   - Neurology following as inpatient.   - Continue ASA.   - Continue amlodipine.   - PRN clonidine.       Hypokalemia: Patient with low potassium during inpatient stay.   - Scheduled replacement.     Mechanical fall: On 3/24. Per notes, response called for fall.   - Imaging including CT head and neck as well as xrays negative.   - Up with assist, fall precautions.   - Therapies following.       Vitamin D deficiency: Returned low on 3/22.   - Vitamin D supplementation.       Possible HTN She denies any medical hx but her  reports a  "longstanding hx of HTN. She does not go to doctors as she thinks they are \"of no use\" according to her . BP mildly elevated her in the ER.   - Amlodipine as above.       Pending Results   These results will be followed up by PCP  Unresulted Labs Ordered in the Past 30 Days of this Admission     No orders found from 1/20/2017 to 3/22/2017.          Code Status   Full Code       Primary Care Physician   None    Physical Exam   Temp: 98  F (36.7  C) Temp src: Oral BP: 137/74 Pulse: 85 Heart Rate: 64 Resp: 16 SpO2: 90 % O2 Device: None (Room air)    Vitals:    03/28/17 0630 03/29/17 0629 03/31/17 0418   Weight: 79 kg (174 lb 2.6 oz) 79.3 kg (174 lb 13.2 oz) 76.9 kg (169 lb 8.5 oz)     Vital Signs with Ranges  Temp:  [97.6  F (36.4  C)-98.6  F (37  C)] 98  F (36.7  C)  Pulse:  [74-85] 85  Heart Rate:  [64-70] 64  Resp:  [16-18] 16  BP: (110-139)/(62-84) 137/74  SpO2:  [90 %-94 %] 90 %  I/O last 3 completed shifts:  In: 270 [P.O.:100; I.V.:170]  Out: 500 [Urine:500]    GENERAL: NAD. Gradually more alert.   HEENT: Normocephalic. Nares normal.   LUNGS: Clear with minimal decrement. No dyspnea at rest.   HEART: Extremities perfused.   EXTREMITIES: No LE edema noted.   NEUROLOGIC: Left sided paresis continued. Follows commands.     Discharge Disposition   Discharged to rehab facility.   Condition at discharge: Stable    Consultations This Hospital Stay   NEUROLOGY IP CONSULT  SWALLOW EVAL SPEECH PATH AT BEDSIDE IP CONSULT  PHYSICAL THERAPY ADULT IP CONSULT  OCCUPATIONAL THERAPY ADULT IP CONSULT  NEUROLOGY CRITICAL CARE IP CONSULT  VASCULAR ACCESS ADULT IP CONSULT  SOCIAL WORK IP CONSULT  PHYSICAL THERAPY ADULT IP CONSULT  OCCUPATIONAL THERAPY ADULT IP CONSULT  SPEECH LANGUAGE PATH ADULT IP CONSULT    Time Spent on this Encounter   Dipak COPELAND, personally saw the patient today and spent greater than 30 minutes discharging this patient.    Discharge Orders     General info for SNF   Length of Stay Estimate: " Short Term Care: Estimated # of Days <30  Condition at Discharge: Improving  Level of care:skilled   Rehabilitation Potential: Good  Admission H&P remains valid and up-to-date: Yes  Recent Chemotherapy: N/A  Use Nursing Home Standing Orders: Yes     Mantoux instructions   Give two-step Mantoux (PPD) Per Facility Policy Yes     Reason for your hospital stay   Stroke     Follow Up and recommended labs and tests   Follow up with prison physician.  The following labs/tests are recommended: cbc/bmp.  Follow up with primary care provider.   Follow up with Neurology as directed.     Activity - Up with nursing assistance     Full Code     Physical Therapy Adult Consult   Evaluate and treat as clinically indicated.    Reason:  Physical deconditioning, stroke     Occupational Therapy Adult Consult   Evaluate and treat as clinically indicated.    Reason:   Physical deconditioning, stroke     Speech Language Path Adult Consult   Evaluate and treat as clinically indicated.    Reason:   Physical deconditioning, stroke     Fall precautions     Advance Diet as Tolerated   Follow this diet upon discharge: Orders Placed This Encounter     Snacks/Supplements Adult: Other - Please comment; Strawberry Smoothie; Between Meals (TID (B-L-D))     Snacks/Supplements Adult: Ensure Clear; With Meals     Room Service     Calorie Counts     Combination Diet Dysphagia Diet Level 3: Advanced; Thin Liquids (water, ice chips, juice, milk gelatin, ice cream, etc)       Discharge Medications   Current Discharge Medication List      START taking these medications    Details   co-enzyme Q-10 100 MG CAPS capsule Take 1 capsule (100 mg) by mouth daily  Refills: 0    Associated Diagnoses: Acute ischemic stroke (H)      cloNIDine (CATAPRES) 0.1 MG tablet Take 1 tablet (0.1 mg) by mouth every 4 hours as needed (SBP > 160)  Qty: 30 tablet    Associated Diagnoses: Acute ischemic stroke (H)      amLODIPine (NORVASC) 10 MG tablet Take 1 tablet (10 mg) by  mouth daily  Qty: 30 tablet    Associated Diagnoses: Acute ischemic stroke (H)      senna-docusate (SENOKOT-S;PERICOLACE) 8.6-50 MG per tablet Take 1-2 tablets by mouth 2 times daily as needed (constipation )  Qty: 100 tablet    Associated Diagnoses: Acute ischemic stroke (H)      potassium phosphate, monobasic, (K-PHOS) 500 MG tablet Take 1 tablet (500 mg) by mouth daily    Associated Diagnoses: Acute ischemic stroke (H)      cholecalciferol 2000 UNITS tablet Take 2,000 Units by mouth daily  Qty: 30 tablet    Associated Diagnoses: Acute ischemic stroke (H)         CONTINUE these medications which have CHANGED    Details   aspirin  MG EC tablet Take 1 tablet (325 mg) by mouth daily  Qty: 40 tablet    Associated Diagnoses: Acute ischemic stroke (H)           Allergies   No Known Allergies  Data   Most Recent 3 CBC's:  Recent Labs   Lab Test  03/31/17   0630  03/30/17   0900  03/29/17   0420   WBC  5.7  6.1  6.4   HGB  11.9  12.1  11.5*   MCV  93  94  96   PLT  144*  150  126*      Most Recent 3 BMP's:  Recent Labs   Lab Test  03/31/17   0630  03/30/17   1810  03/30/17   0900  03/29/17   0420   NA  142   --   141  148*   POTASSIUM  3.4  3.5  2.8*  3.3*   CHLORIDE  108   --   107  116*   CO2  26   --   26  27   BUN  15   --   11  15   CR  0.56   --   0.48*  0.53   ANIONGAP  8   --   8  5   RADHA  7.8*   --   7.7*  7.7*   GLC  95   --   114*  98     Most Recent 2 LFT's:  Recent Labs   Lab Test  03/22/17   0515   AST  22   ALT  16   ALKPHOS  67   BILITOTAL  0.4     Most Recent INR's and Anticoagulation Dosing History:  Anticoagulation Dose History     Recent Dosing and Labs Latest Ref Rng & Units 3/21/2017 3/22/2017    INR 0.86 - 1.14 0.95 0.98        Most Recent 3 Troponin's:  Recent Labs   Lab Test  03/23/17   0935  03/22/17   0515  03/21/17   2215   TROPI  0.064*  0.031  <0.015     Most Recent Cholesterol Panel:  Recent Labs   Lab Test  03/21/17   1327   CHOL  207*   LDL  108*   HDL  73   TRIG  130     Most Recent  6 Bacteria Isolates From Any Culture (See EPIC Reports for Culture Details):No lab results found.  Most Recent TSH, T4 and A1c Labs:  Recent Labs   Lab Test  03/22/17   0515  03/21/17   1327   TSH   --   1.89   A1C  5.7  5.9     Results for orders placed or performed during the hospital encounter of 03/21/17   CT Head w Contrast    Narrative    CT HEAD WITH CONTRAST 3/21/2017 2:02 PM     HISTORY: Stroke.    TECHNIQUE: Axial images were obtained through the brain with  intravenous contrast. 50 mL of Isovue-370 was given. Multiplanar  reconstructions were performed for CT perfusion imaging. Radiation  dose for this scan was reduced using automated exposure control,  adjustment of the mA and/or kV according to patient size, or iterative  reconstruction technique..    FINDINGS: Cerebral blood flow, cerebral blood volume, and mean transit  time maps show a moderate to large matched perfusion defect in the  posterior division of the right middle cerebral artery territory  consistent with an infarct. Remainder of perfusion appears normal.      Impression    IMPRESSION: Moderate to large right posterior division middle cerebral  artery territory matched defect consistent with infarct.    DAVONTE REYES MD   CT Head w/o Contrast    Narrative    CT SCAN OF THE HEAD WITHOUT CONTRAST   3/21/2017 1:45 PM     HISTORY: Stroke.    TECHNIQUE: Axial images of the head and coronal reformations without  IV contrast material. Radiation dose for this scan was reduced using  automated exposure control, adjustment of the mA and/or kV according  to patient size, or iterative reconstruction technique.    COMPARISON: None.    FINDINGS: There is some borderline mild cerebral atrophy. Minimal  nonspecific white matter changes are present in both hemispheres  without mass effect. There is no evidence for intracranial hemorrhage,  mass effect, acute infarct, or a skull fracture.      Impression    IMPRESSION: Minimal chronic changes. No evidence  for intracranial  hemorrhage or any acute process.      DAVONTE REYES MD   CTA Angiogram Head Neck    Narrative    CTA ANGIOGRAM HEAD AND NECK 3/21/2017 1:56 PM     HISTORY: Stroke    TECHNIQUE: Axial images were obtained through the head and neck  without and with intravenous contrast. 70 mL Isovue-370 was given.  Multiplanar reconstructions were performed. 3-D reconstructions off a  remote workstation for CT angiography were also acquired. Carotid  stenoses were evaluated by comparing the caliber of the proximal  internal carotid artery to the caliber of the distal internal carotid  artery. Radiation dose for this scan was reduced using automated  exposure control, adjustment of the mA and/or kV according to patient  size, or iterative reconstruction technique.    COMPARISON: None.    FINDINGS:    Brachiocephalic vessels: There is some mild calcific plaque along the  thoracic arch. Proximal brachiocephalic vessels are patent. There is a  small left vertebral artery which has direct origin off the arch.    Right carotid system: Normal.    Left carotid system: Normal.    Right vertebral artery: Normal.    Left vertebral artery: Normal.    [place]: There is abnormal short segment filling defect or  high-grade stenosis in the distal right M1 proximal M2 segment with  poor filling of an M2 branch. The left middle cerebral artery and  branches are patent. The distal internal carotid arteries and proximal  anterior cerebral arteries are patent. The basilar artery and proximal  posterior cerebral arteries are patent. There is no evidence for  aneurysm.      Impression    IMPRESSION: Filling defect versus short segment high-grade stenosis  involving the distal right M1 and proximal M2 branch with poor filling  of a right M2 branch. Finding is probably due to a thromboembolus.    DAVONTE REYES MD   IR Carotid Cerebral Angiogram Right    Addendum: 3/23/2017    Addendum to original report:  No 3-D rotational angiogram  was performed    KALEY CAMERON MD      Narrative    CAROTID AND CEREBRAL ANGIOGRAM WITH 3-D ROTATIONAL ANGIOGRAPHY  3/21/2017 3:55 PM    CLINICAL INFORMATION: 76-year-old woman who collapsed while at a  bridal shop. She was found have significant left-sided weakness, and  taken to the ER, where CTA reveals occlusion of the M2 origin of the  posterior division. She was given a full dose of IV TPA, but cerebral  angiogram with possible mechanical thrombectomy requested.     OPERATIONS:  1. Right common carotid angiograms centered over the carotid  vasculature in the neck  2. Right common carotid angiograms centered over the cerebral  vasculature    PROCEDURE:    The nature of the procedure was discussed in detail the patient and  her , including potential risks and benefits. All of their  questions were answered written and verbal informed consent obtained  from the patient has been. The patient was then brought to the  neuroangiography suite and placed supine on the table. Both groins  were prepped and draped in sterile fashion. In addition, intravenous  moderate conscious sedation with appropriate preoperative,  intraoperative, and postoperative physician and nurse monitoring was  also started at that point, and continued for a total of 30 minutes.  The patient's vital signs remained stable throughout the procedure.    Under local anesthesia with lidocaine, and using sterile technique,  the right common femoral artery was directly percutaneously accessed  with a micropuncture set and a 5 Filipino sheath placed. A 5 Filipino  Jalbumenstein diagnostic catheter was advanced into the aortic arch and  placed selectively into the right common carotid artery, right  internal carotid artery. Numerous diagnostic biplane angiograms  centered over the carotid vasculature in the neck and the cerebral  vasculature in the head were obtained in various projections. There  was noted to be an M2 occlusion of the posterior  division of the right  middle cerebral artery, with patency of the anterior division and M1  segment, but TICI 0 flow into the posterior division. Therefore, a  decision was made to attempt mechanical thrombectomy. 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 The Berenstein catheter was unable  to be advanced around this loop despite several attempts. The  indwelling right common femoral 5 Ghanaian sheath was exchanged over a  Winters wire for a 90 cm neuron Max sheath which was advanced triaxially  over the Winters wire and a 1 25 cm Berenstein catheter. The neuron Max  catheter was then advanced into the right common carotid, and was able  to be advanced to the proximal side of the loop, but was completely  hubbed at that point and unable to be advanced more distally.  Therefore, given the proximal nature of the guide catheter and the  length and needed to reach the M2 segment, it did not appear that  thrombectomy would be possible. The procedure was therefore  terminated. The neuron Max catheter was withdrawn over the neuron Max  wire and exchanged for a short 7 Ghanaian sheath which was sutured into  place. A scratch The patient appeared to tolerate procedure well with  no evidence of a complication.    Total sedation time: 30 minutes    Medications:  Versed 3.5 mg IV  Fentanyl 175 mcg IV  Lidocaine: 10 cc 1% lidocaine subcutaneously    Total fluoroscopy time: 9.0 minutes  Air Kerma: 639 mGy    EBL: Minimal    Total contrast: 20 mL Isovue-320    FINDINGS:    As noted above, there is complete occlusion of the posterior division  of the right middle cerebral artery, with occlusion at the M2 origin  of the posterior division and TICI 0 flow. The anterior division is  widely patent, as is the M1 segment. Widely patent right anterior  cerebral artery. Extracranially, minor plaque in the right carotid  bulb, but no significant right internal carotid  origin stenosis by  NASCET criteria. There is significant tortuosity of the aortic arch,  with patency of the right common carotid, but a severe, complete 360  degree loop in the proximal right common carotid. As noted above, the  neuron Max guide catheter was only able to be advanced to the proximal  aspect of the proximal common carotid 360 degree loop when completely  hubbed at the skin. Therefore, mechanical thrombectomy was not  possible given lack of catheter length, and was not performed.      Impression    IMPRESSION:    1.  Complete occlusion of the posterior division of the right middle  cerebral artery with clot at the M2 origin. The anterior division is  widely patent, as is the M1 segment.  2.  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 As such, the  longest available neuron Max guide catheter was only able to be  advanced to the proximal aspect of the 360 degree loop when completely  hubbed at the skin. Mechanical thrombectomy was, therefore, not  possible, and was not performed.  3.  Minor plaque in the right carotid bulb, but no significant right  internal carotid origin stenosis by NASA criteria.   4.  Findings discussed with Dr. Murdock.    EVALUATION AND STENOSIS DETERMINATION MADE USING NASCET CRITERIA.    KALEY CAMERON MD   IR Discontinue Sheath    Narrative    This exam was marked as non-reportable because it will not be read by a   radiologist or a [place] non-radiologist provider.             MR Brain w/o & w Contrast    Narrative    MRI BRAIN WITHOUT AND WITH CONTRAST  3/22/2017 12:50 PM    HISTORY:  Stroke.     TECHNIQUE:  Multiplanar, multisequence MRI of the brain without and  with 8 mL Gadavist.    COMPARISON: Head CT on 3/21/2017.     FINDINGS: Diffusion-weighted images show a moderate to large posterior  division right middle cerebral territory infarct. There is a small  amount of cortical magnetic  susceptibility artifact which is probably  due to petechial hemorrhage, although macroscopic hemorrhage cannot  entirely be excluded. There is localized mass effect and a trace  amount of right-to-left midline shift of approximately 2 mm. The  ventricles are not enlarged. Old bilateral cerebellar infarcts are  present. There appears to be some T2 shine through in the left  cerebellum from an old infarct, although is difficult to exclude a  tiny acute ischemic infarct in the left cerebellum as well. Vascular  structures are patent at the skull base. Postcontrast images do not  show any abnormal parenchymal enhancement. There is some hyperemia in  the right posterior division middle cerebral artery territory.      Impression    IMPRESSION:  1. Moderate to large acute ischemic right posterior division middle  cerebral artery territory infarct with some petechial hemorrhage.  Difficult to exclude macroscopic hemorrhage. If clinically indicated,  CT without contrast might be helpful in further evaluation. There is  some localized mass effect with some very minimal right-to-left  midline shift.  2. Small old bilateral cerebellar infarcts. In addition, there is a  questionable tiny acute ischemic infarct in the left cerebellum which  could just be due to due to T2 shine through.    DAVONTE REYES MD   CT Head w/o Contrast    Narrative    CT HEAD W/O CONTRAST   3/23/2017 6:07 AM     HISTORY: follow up hemorrhagic stroke    TECHNIQUE:  Axial images of the head without IV contrast material.  Radiation dose for this scan was reduced using automated exposure  control, adjustment of the mA and/or kV according to patient size, or  iterative reconstruction technique.    COMPARISON: MR scan dated 3/22/2017    FINDINGS: Scans again reveal an evolving infarct in the right  temporal-parietal region. There is subtle increased density within the  infarct compatible with petechial hemorrhage. This corresponds to the  susceptibility  artifact seen on the MR scan. No new areas of  hemorrhage are identified. There is mild mass effect secondary to the  infarct. There is mild shift of midline structures to the left. This  measures about 5 mm. No new areas of infarction are identified.      Impression    IMPRESSION: Evolving right middle cerebral artery distribution  infarct. Small amount of petechial hemorrhage within the infarct is  unchanged. Slight increase in the amount of midline shift.    HALLE MURDOCK MD   CT Head w/o Contrast    Narrative    CT HEAD WITHOUT CONTRAST  3/24/2017 7:38 AM    HISTORY: Unwitnessed fall. Left hemiparesis.    TECHNIQUE: Scans were obtained through the head without IV contrast.   Radiation dose for this scan was reduced using automated exposure  control, adjustment of the mA and/or kV according to patient size, or  iterative reconstruction technique.    COMPARISON: CT 3/23/2017.    FINDINGS: There is a large evolving right hemispheric infarct  involving posterior frontal, parietal, portion of the posterior  temporal lobe, and a portion of the right occipital lobe. This has  become fairly well demarcated and has not changed significantly since  yesterday. The infarct does have moderate mass effect effacing the  right lateral ventricle and resulting in 6 mm of midline shift to the  left, unchanged by my measurements as compared with yesterday.  No  hemorrhage. No hydrocephalus.    Sinuses are clear. No bony abnormality.       Impression    IMPRESSION:   1. Evolving large area of infarction right hemisphere.  2. No hemorrhage.  3. Moderate mass effect as previously described with some midline  shift to the left but no interval change.    WOODY ANGELES MD   CT Cervical Spine w/o Contrast    Narrative    CT CERVICAL SPINE WITHOUT CONTRAST  3/24/2017 7:44 AM    HISTORY:  Trauma. Fell.    COMPARISON: None.    TECHNIQUE: CT cervical spine extended through T3. Multiplanar  reconstruction. Radiation dose for this scan was  reduced using  automated exposure control, adjustment of the mA and/or kV according  to patient size, or iterative reconstruction technique.    FINDINGS: Alignment is normal through T3. No fracture. No malignant or  destructive changes.    Degenerative changes by level as follows:    C2-C3: Negative.    C3-C4: Minimal facet joint disease on the right. Minimal annular  bulge. No central or lateral stenosis.    C4-C5: Tiny uncinate spurs without significant stenosis. Minimal facet  joint disease bilaterally.    C5-C6: Minimal facet joint disease. No central or lateral stenosis.    C6-C7: Negative.    C7-T1: Negative.      Impression    IMPRESSION:  1. No fracture or acute abnormality.  2. Minimal degenerative change as described.    WOODY ANGELES MD   XR Pelvis w Hip Left 1 View    Narrative    PELVIS AND HIP LEFT ONE VIEW   3/24/2017 7:52 AM     HISTORY: Same as CT plus hip pain.    COMPARISON: None.      Impression    IMPRESSION: No evidence of fracture.    RODERICK TENORIO MD   XR Chest Port 1 View    Narrative    XR CHEST PORT 1 VW 3/24/2017 7:24 PM    HISTORY: change in breathing pattern    COMPARISON: None      Impression    IMPRESSION:   Patchy left lower lobe infiltrate suspicious for pneumonia. Right lung  grossly clear although mediastinal silhouette is distorted by  positioning and there may be scoliosis. There is a right PICC line  with tip over the expected location of distal SVC. Rounded density in  the retrocardiac region probably a moderately large hiatal hernia.    RORY MENENDEZ MD   CT Head w/o Contrast    Narrative    CT SCAN OF THE HEAD WITHOUT CONTRAST March 26, 2017 6:01 AM     HISTORY: Cerebral edema.    TECHNIQUE: Axial images of the head and coronal reformations without  IV contrast material. Radiation dose for this scan was reduced using  automated exposure control, adjustment of the mA and/or kV according  to patient size, or iterative reconstruction technique.    COMPARISON:  3/24/2017.    FINDINGS: Again seen is an evolving right middle cerebral artery  territory infarct with localized mass effect and right-to-left midline  shift of approximately 9 mm. This compares to 8 mm seen on the prior  exam. There is some minimal prominence of the left temporal horn  somewhat similar to that seen on the prior study. There is no evidence  for any hemorrhagic transformation. There is no evidence for any new  infarcts. There is no evidence for skull fracture. Vascular  calcifications are noted. Visualized sinuses and mastoid air cells are  clear.      Impression    IMPRESSION: Evolving ischemic right middle cerebral artery territory  infarct with 9 mm of right-to-left shift and minimal prominence of the  temporal horn. The mass effect is minimally more prominent than the  prior study.    DAVONTE REYES MD   CT Head w/o Contrast    Narrative    CT OF THE HEAD WITHOUT CONTRAST 3/28/2017 8:13 AM     COMPARISON: Head CT 3/26/2017.    HISTORY: Re-evaluate edema.    TECHNIQUE: Axial CT images of the head from the skull base to the  vertex were acquired without IV contrast.    FINDINGS: An evolving large right MCA territory infarct is again  noted. Edema within the infarct results in approximately 0.9 cm of  leftward midline shift of the septum pellucidum which is not  significantly changed from the comparison study. There is no evidence  for hemorrhagic transformation anywhere within the infarct. No new  ischemic infarct noted.    The ventricles and basal cisterns are within normal limits in  configuration. There are no extra-axial fluid collections. No  intracranial hemorrhage or mass.    The visualized paranasal sinuses are well-aerated. There is no  mastoiditis. There are no fractures of the visualized bones.      Impression    IMPRESSION:  1. Evolving large right MCA territory infarct again noted. Edema  within the infarct results in 0.9 cm of leftward midline shift of the  septum pellucidum which is  unchanged.  2. No intracranial hemorrhage.  3. No new infarcts.      Radiation dose for this scan was reduced using automated exposure  control, adjustment of the mA and/or kV according to patient size, or  iterative reconstruction technique.    KALEY ACKERMAN MD

## 2017-04-20 ENCOUNTER — OFFICE VISIT - HEALTHEAST (OUTPATIENT)
Dept: GERIATRICS | Facility: CLINIC | Age: 77
End: 2017-04-20

## 2017-04-20 ENCOUNTER — AMBULATORY - HEALTHEAST (OUTPATIENT)
Dept: ADMINISTRATIVE | Facility: CLINIC | Age: 77
End: 2017-04-20

## 2017-04-20 DIAGNOSIS — I63.9 CVA (CEREBRAL VASCULAR ACCIDENT) (H): ICD-10-CM

## 2017-04-20 DIAGNOSIS — I10 HYPERTENSION: ICD-10-CM

## 2017-04-20 DIAGNOSIS — F32.A DEPRESSION: ICD-10-CM

## 2017-04-20 DIAGNOSIS — I48.91 ATRIAL FIBRILLATION (H): ICD-10-CM

## 2017-04-21 ENCOUNTER — AMBULATORY - HEALTHEAST (OUTPATIENT)
Dept: GERIATRICS | Facility: CLINIC | Age: 77
End: 2017-04-21

## 2017-04-24 ENCOUNTER — AMBULATORY - HEALTHEAST (OUTPATIENT)
Dept: GERIATRICS | Facility: CLINIC | Age: 77
End: 2017-04-24

## 2017-04-25 ENCOUNTER — OFFICE VISIT - HEALTHEAST (OUTPATIENT)
Dept: GERIATRICS | Facility: CLINIC | Age: 77
End: 2017-04-25

## 2017-04-25 ENCOUNTER — AMBULATORY - HEALTHEAST (OUTPATIENT)
Dept: ADMINISTRATIVE | Facility: CLINIC | Age: 77
End: 2017-04-25

## 2017-04-25 DIAGNOSIS — I63.9 CVA (CEREBRAL VASCULAR ACCIDENT) (H): ICD-10-CM

## 2017-04-25 DIAGNOSIS — I48.91 ATRIAL FIBRILLATION (H): ICD-10-CM

## 2017-04-25 DIAGNOSIS — F32.A DEPRESSION: ICD-10-CM

## 2017-04-25 DIAGNOSIS — I10 HYPERTENSION: ICD-10-CM

## 2017-04-27 ENCOUNTER — OFFICE VISIT - HEALTHEAST (OUTPATIENT)
Dept: GERIATRICS | Facility: CLINIC | Age: 77
End: 2017-04-27

## 2017-04-27 ENCOUNTER — AMBULATORY - HEALTHEAST (OUTPATIENT)
Dept: GERIATRICS | Facility: CLINIC | Age: 77
End: 2017-04-27

## 2017-04-27 DIAGNOSIS — I48.91 ATRIAL FIBRILLATION (H): ICD-10-CM

## 2017-04-27 DIAGNOSIS — I10 HYPERTENSION: ICD-10-CM

## 2017-04-27 DIAGNOSIS — I63.9 CVA (CEREBRAL VASCULAR ACCIDENT) (H): ICD-10-CM

## 2017-04-27 DIAGNOSIS — F32.A DEPRESSION: ICD-10-CM

## 2017-04-28 ENCOUNTER — AMBULATORY - HEALTHEAST (OUTPATIENT)
Dept: GERIATRICS | Facility: CLINIC | Age: 77
End: 2017-04-28

## 2017-05-01 ENCOUNTER — AMBULATORY - HEALTHEAST (OUTPATIENT)
Dept: GERIATRICS | Facility: CLINIC | Age: 77
End: 2017-05-01

## 2017-05-02 ENCOUNTER — OFFICE VISIT - HEALTHEAST (OUTPATIENT)
Dept: GERIATRICS | Facility: CLINIC | Age: 77
End: 2017-05-02

## 2017-05-02 DIAGNOSIS — I10 HYPERTENSION: ICD-10-CM

## 2017-05-02 DIAGNOSIS — I48.91 ATRIAL FIBRILLATION (H): ICD-10-CM

## 2017-05-02 DIAGNOSIS — F32.A DEPRESSION: ICD-10-CM

## 2017-05-02 DIAGNOSIS — I63.9 CVA (CEREBRAL VASCULAR ACCIDENT) (H): ICD-10-CM

## 2017-05-04 ENCOUNTER — OFFICE VISIT - HEALTHEAST (OUTPATIENT)
Dept: GERIATRICS | Facility: CLINIC | Age: 77
End: 2017-05-04

## 2017-05-04 DIAGNOSIS — I63.9 CVA (CEREBRAL VASCULAR ACCIDENT) (H): ICD-10-CM

## 2017-05-04 DIAGNOSIS — I10 HYPERTENSION: ICD-10-CM

## 2017-05-04 DIAGNOSIS — F32.A DEPRESSION: ICD-10-CM

## 2017-05-04 DIAGNOSIS — I48.91 ATRIAL FIBRILLATION (H): ICD-10-CM

## 2017-05-09 ENCOUNTER — OFFICE VISIT - HEALTHEAST (OUTPATIENT)
Dept: GERIATRICS | Facility: CLINIC | Age: 77
End: 2017-05-09

## 2017-05-09 DIAGNOSIS — I48.91 ATRIAL FIBRILLATION (H): ICD-10-CM

## 2017-05-09 DIAGNOSIS — I63.9 CVA (CEREBRAL VASCULAR ACCIDENT) (H): ICD-10-CM

## 2017-05-09 DIAGNOSIS — I10 HYPERTENSION: ICD-10-CM

## 2017-05-09 DIAGNOSIS — F32.A DEPRESSION: ICD-10-CM

## 2017-05-11 ENCOUNTER — OFFICE VISIT - HEALTHEAST (OUTPATIENT)
Dept: GERIATRICS | Facility: CLINIC | Age: 77
End: 2017-05-11

## 2017-05-11 DIAGNOSIS — I63.9 CVA (CEREBRAL VASCULAR ACCIDENT) (H): ICD-10-CM

## 2017-05-11 DIAGNOSIS — I48.91 ATRIAL FIBRILLATION (H): ICD-10-CM

## 2017-05-11 DIAGNOSIS — I10 HYPERTENSION: ICD-10-CM

## 2017-05-11 DIAGNOSIS — F32.A DEPRESSION: ICD-10-CM

## 2017-05-12 ENCOUNTER — AMBULATORY - HEALTHEAST (OUTPATIENT)
Dept: GERIATRICS | Facility: CLINIC | Age: 77
End: 2017-05-12

## 2017-05-15 ENCOUNTER — AMBULATORY - HEALTHEAST (OUTPATIENT)
Dept: GERIATRICS | Facility: CLINIC | Age: 77
End: 2017-05-15

## 2017-05-16 ENCOUNTER — OFFICE VISIT - HEALTHEAST (OUTPATIENT)
Dept: GERIATRICS | Facility: CLINIC | Age: 77
End: 2017-05-16

## 2017-05-16 DIAGNOSIS — I48.91 ATRIAL FIBRILLATION (H): ICD-10-CM

## 2017-05-16 DIAGNOSIS — F32.A DEPRESSION: ICD-10-CM

## 2017-05-16 DIAGNOSIS — I63.9 CVA (CEREBRAL VASCULAR ACCIDENT) (H): ICD-10-CM

## 2017-05-16 DIAGNOSIS — I10 HYPERTENSION: ICD-10-CM

## 2017-05-18 ENCOUNTER — OFFICE VISIT - HEALTHEAST (OUTPATIENT)
Dept: GERIATRICS | Facility: CLINIC | Age: 77
End: 2017-05-18

## 2017-05-18 DIAGNOSIS — I48.91 ATRIAL FIBRILLATION (H): ICD-10-CM

## 2017-05-18 DIAGNOSIS — I63.9 CVA (CEREBRAL VASCULAR ACCIDENT) (H): ICD-10-CM

## 2017-05-18 DIAGNOSIS — I10 HYPERTENSION: ICD-10-CM

## 2017-05-18 DIAGNOSIS — F32.A DEPRESSION: ICD-10-CM

## 2017-05-23 ENCOUNTER — OFFICE VISIT - HEALTHEAST (OUTPATIENT)
Dept: GERIATRICS | Facility: CLINIC | Age: 77
End: 2017-05-23

## 2017-05-23 DIAGNOSIS — I63.9 CVA (CEREBRAL VASCULAR ACCIDENT) (H): ICD-10-CM

## 2017-05-23 DIAGNOSIS — I10 HYPERTENSION: ICD-10-CM

## 2017-05-23 DIAGNOSIS — I48.91 ATRIAL FIBRILLATION (H): ICD-10-CM

## 2017-05-23 DIAGNOSIS — F32.A DEPRESSION: ICD-10-CM

## 2017-05-25 ENCOUNTER — OFFICE VISIT - HEALTHEAST (OUTPATIENT)
Dept: GERIATRICS | Facility: CLINIC | Age: 77
End: 2017-05-25

## 2017-05-25 DIAGNOSIS — I63.9 CVA (CEREBRAL VASCULAR ACCIDENT) (H): ICD-10-CM

## 2017-05-25 DIAGNOSIS — F32.A DEPRESSION: ICD-10-CM

## 2017-05-25 DIAGNOSIS — I10 HYPERTENSION: ICD-10-CM

## 2017-05-25 DIAGNOSIS — I48.91 ATRIAL FIBRILLATION (H): ICD-10-CM

## 2017-05-30 ENCOUNTER — OFFICE VISIT - HEALTHEAST (OUTPATIENT)
Dept: GERIATRICS | Facility: CLINIC | Age: 77
End: 2017-05-30

## 2017-05-30 DIAGNOSIS — I10 HYPERTENSION: ICD-10-CM

## 2017-05-30 DIAGNOSIS — F32.A DEPRESSION: ICD-10-CM

## 2017-05-30 DIAGNOSIS — I63.9 CVA (CEREBRAL VASCULAR ACCIDENT) (H): ICD-10-CM

## 2017-05-30 DIAGNOSIS — I48.91 ATRIAL FIBRILLATION (H): ICD-10-CM

## 2017-06-01 ENCOUNTER — OFFICE VISIT - HEALTHEAST (OUTPATIENT)
Dept: GERIATRICS | Facility: CLINIC | Age: 77
End: 2017-06-01

## 2017-06-01 DIAGNOSIS — F32.A DEPRESSION: ICD-10-CM

## 2017-06-01 DIAGNOSIS — I48.91 ATRIAL FIBRILLATION (H): ICD-10-CM

## 2017-06-01 DIAGNOSIS — I63.9 CVA (CEREBRAL VASCULAR ACCIDENT) (H): ICD-10-CM

## 2017-06-01 DIAGNOSIS — I10 HYPERTENSION: ICD-10-CM

## 2017-06-02 ENCOUNTER — OFFICE VISIT - HEALTHEAST (OUTPATIENT)
Dept: GERIATRICS | Facility: CLINIC | Age: 77
End: 2017-06-02

## 2017-06-02 DIAGNOSIS — I63.9 CVA (CEREBRAL VASCULAR ACCIDENT) (H): ICD-10-CM

## 2017-06-02 DIAGNOSIS — R53.81 OTHER MALAISE AND FATIGUE: ICD-10-CM

## 2017-06-02 DIAGNOSIS — G81.90 HEMIPLEGIA (H): ICD-10-CM

## 2017-06-02 DIAGNOSIS — R53.83 OTHER MALAISE AND FATIGUE: ICD-10-CM

## 2017-06-02 DIAGNOSIS — I48.91 ATRIAL FIBRILLATION (H): ICD-10-CM

## 2017-06-06 ENCOUNTER — OFFICE VISIT - HEALTHEAST (OUTPATIENT)
Dept: GERIATRICS | Facility: CLINIC | Age: 77
End: 2017-06-06

## 2017-06-06 DIAGNOSIS — R53.83 OTHER MALAISE AND FATIGUE: ICD-10-CM

## 2017-06-06 DIAGNOSIS — I63.9 CVA (CEREBRAL VASCULAR ACCIDENT) (H): ICD-10-CM

## 2017-06-06 DIAGNOSIS — R53.81 OTHER MALAISE AND FATIGUE: ICD-10-CM

## 2017-06-06 DIAGNOSIS — I48.91 ATRIAL FIBRILLATION (H): ICD-10-CM

## 2017-06-06 DIAGNOSIS — G81.90 HEMIPLEGIA (H): ICD-10-CM

## 2017-06-09 ENCOUNTER — AMBULATORY - HEALTHEAST (OUTPATIENT)
Dept: GERIATRICS | Facility: CLINIC | Age: 77
End: 2017-06-09

## 2018-01-01 ENCOUNTER — OFFICE VISIT - HEALTHEAST (OUTPATIENT)
Dept: GERIATRICS | Facility: CLINIC | Age: 78
End: 2018-01-01

## 2018-01-01 ENCOUNTER — CARE COORDINATION (OUTPATIENT)
Dept: CARE COORDINATION | Facility: CLINIC | Age: 78
End: 2018-01-01

## 2018-01-01 ENCOUNTER — AMBULATORY - HEALTHEAST (OUTPATIENT)
Dept: LAB | Facility: CLINIC | Age: 78
End: 2018-01-01

## 2018-01-01 ENCOUNTER — AMBULATORY - HEALTHEAST (OUTPATIENT)
Dept: PODIATRY | Facility: CLINIC | Age: 78
End: 2018-01-01

## 2018-01-01 ENCOUNTER — HOSPITAL ENCOUNTER (OUTPATIENT)
Dept: ULTRASOUND IMAGING | Facility: CLINIC | Age: 78
Discharge: HOME | End: 2018-06-01
Attending: INTERNAL MEDICINE

## 2018-01-01 ENCOUNTER — AMBULATORY - HEALTHEAST (OUTPATIENT)
Dept: INTERNAL MEDICINE | Facility: CLINIC | Age: 78
End: 2018-01-01

## 2018-01-01 ENCOUNTER — RECORDS - HEALTHEAST (OUTPATIENT)
Dept: ADMINISTRATIVE | Facility: OTHER | Age: 78
End: 2018-01-01

## 2018-01-01 ENCOUNTER — COMMUNICATION - HEALTHEAST (OUTPATIENT)
Dept: NURSING | Facility: CLINIC | Age: 78
End: 2018-01-01

## 2018-01-01 ENCOUNTER — TRANSFERRED RECORDS (OUTPATIENT)
Dept: HEALTH INFORMATION MANAGEMENT | Facility: CLINIC | Age: 78
End: 2018-01-01

## 2018-01-01 ENCOUNTER — COMMUNICATION - HEALTHEAST (OUTPATIENT)
Dept: INTERNAL MEDICINE | Facility: CLINIC | Age: 78
End: 2018-01-01

## 2018-01-01 ENCOUNTER — COMMUNICATION - HEALTHEAST (OUTPATIENT)
Dept: ANTICOAGULATION | Facility: CLINIC | Age: 78
End: 2018-01-01

## 2018-01-01 ENCOUNTER — AMBULATORY - HEALTHEAST (OUTPATIENT)
Dept: GERIATRICS | Facility: CLINIC | Age: 78
End: 2018-01-01

## 2018-01-01 ENCOUNTER — OFFICE VISIT - HEALTHEAST (OUTPATIENT)
Dept: INTERNAL MEDICINE | Facility: CLINIC | Age: 78
End: 2018-01-01

## 2018-01-01 ENCOUNTER — COMMUNICATION - HEALTHEAST (OUTPATIENT)
Dept: SCHEDULING | Facility: CLINIC | Age: 78
End: 2018-01-01

## 2018-01-01 ENCOUNTER — RECORDS - HEALTHEAST (OUTPATIENT)
Dept: LAB | Facility: CLINIC | Age: 78
End: 2018-01-01

## 2018-01-01 ENCOUNTER — ALLIED HEALTH/NURSE VISIT (OUTPATIENT)
Dept: NEUROLOGY | Facility: CLINIC | Age: 78
End: 2018-01-01
Attending: PSYCHIATRY & NEUROLOGY
Payer: MEDICARE

## 2018-01-01 ENCOUNTER — COMMUNICATION - HEALTHEAST (OUTPATIENT)
Dept: GERIATRICS | Facility: CLINIC | Age: 78
End: 2018-01-01

## 2018-01-01 ENCOUNTER — HOSPITAL ENCOUNTER (OUTPATIENT)
Facility: CLINIC | Age: 78
Setting detail: OBSERVATION
Discharge: NURSING, REHAB OR LONG-TERM CARE | End: 2018-01-28
Attending: PSYCHIATRY & NEUROLOGY | Admitting: PSYCHIATRY & NEUROLOGY
Payer: MEDICARE

## 2018-01-01 VITALS
SYSTOLIC BLOOD PRESSURE: 106 MMHG | TEMPERATURE: 96.6 F | RESPIRATION RATE: 16 BRPM | OXYGEN SATURATION: 95 % | DIASTOLIC BLOOD PRESSURE: 56 MMHG | HEART RATE: 45 BPM

## 2018-01-01 DIAGNOSIS — R31.9 HEMATURIA: ICD-10-CM

## 2018-01-01 DIAGNOSIS — Z79.01 LONG-TERM (CURRENT) USE OF ANTICOAGULANTS: ICD-10-CM

## 2018-01-01 DIAGNOSIS — L60.2 ONYCHAUXIS: ICD-10-CM

## 2018-01-01 DIAGNOSIS — B35.1 NAIL FUNGUS: ICD-10-CM

## 2018-01-01 DIAGNOSIS — D68.9 COAGULOPATHY (H): ICD-10-CM

## 2018-01-01 DIAGNOSIS — I63.511 CEREBRAL INFARCTION INVOLVING RIGHT MIDDLE CEREBRAL ARTERY (H): ICD-10-CM

## 2018-01-01 DIAGNOSIS — G81.90 HEMIPLEGIA (H): ICD-10-CM

## 2018-01-01 DIAGNOSIS — S20.02XA: ICD-10-CM

## 2018-01-01 DIAGNOSIS — G40.909 SEIZURE DISORDER (H): ICD-10-CM

## 2018-01-01 DIAGNOSIS — D62 ACUTE BLOOD LOSS ANEMIA: ICD-10-CM

## 2018-01-01 DIAGNOSIS — I48.0 PAROXYSMAL ATRIAL FIBRILLATION (H): ICD-10-CM

## 2018-01-01 DIAGNOSIS — I82.409 DVT (DEEP VENOUS THROMBOSIS) (H): ICD-10-CM

## 2018-01-01 DIAGNOSIS — I63.9 CVA (CEREBRAL VASCULAR ACCIDENT) (H): ICD-10-CM

## 2018-01-01 DIAGNOSIS — I48.91 ATRIAL FIBRILLATION (H): ICD-10-CM

## 2018-01-01 DIAGNOSIS — R56.9 SEIZURES (H): ICD-10-CM

## 2018-01-01 DIAGNOSIS — N28.89 RENAL HEMORRHAGE, LEFT: ICD-10-CM

## 2018-01-01 DIAGNOSIS — R56.9 SEIZURES (H): Primary | ICD-10-CM

## 2018-01-01 DIAGNOSIS — F41.9 ANXIETY: ICD-10-CM

## 2018-01-01 DIAGNOSIS — R31.9 HEMATURIA, UNSPECIFIED TYPE: ICD-10-CM

## 2018-01-01 DIAGNOSIS — I63.411 CEREBROVASCULAR ACCIDENT (CVA) DUE TO EMBOLISM OF RIGHT MIDDLE CEREBRAL ARTERY (H): ICD-10-CM

## 2018-01-01 DIAGNOSIS — I10 ESSENTIAL HYPERTENSION: ICD-10-CM

## 2018-01-01 DIAGNOSIS — R58 INTERNAL BLEEDING: ICD-10-CM

## 2018-01-01 DIAGNOSIS — F32.A DEPRESSION: ICD-10-CM

## 2018-01-01 DIAGNOSIS — S49.92XA SHOULDER INJURY, LEFT, INITIAL ENCOUNTER: Primary | ICD-10-CM

## 2018-01-01 DIAGNOSIS — I63.9 ISCHEMIC STROKE (H): ICD-10-CM

## 2018-01-01 LAB
ALBUMIN SERPL-MCNC: 2.8 G/DL (ref 3.4–5)
ALBUMIN UR-MCNC: ABNORMAL MG/DL
ALBUMIN UR-MCNC: ABNORMAL MG/DL
ALP SERPL-CCNC: 93 U/L (ref 40–150)
ALT SERPL W P-5'-P-CCNC: 17 U/L (ref 0–50)
ANION GAP SERPL CALCULATED.3IONS-SCNC: 10 MMOL/L (ref 5–18)
ANION GAP SERPL CALCULATED.3IONS-SCNC: 11 MMOL/L (ref 5–18)
ANION GAP SERPL CALCULATED.3IONS-SCNC: 11 MMOL/L (ref 5–18)
ANION GAP SERPL CALCULATED.3IONS-SCNC: 9 MMOL/L (ref 3–14)
APPEARANCE UR: ABNORMAL
APPEARANCE UR: CLEAR
APTT PPP: 30 SECONDS (ref 24–37)
AST SERPL W P-5'-P-CCNC: 20 U/L (ref 0–45)
BACTERIA #/AREA URNS HPF: ABNORMAL HPF
BACTERIA #/AREA URNS HPF: ABNORMAL HPF
BACTERIA SPEC CULT: NO GROWTH
BACTERIA SPEC CULT: NO GROWTH
BASOPHILS # BLD AUTO: 0 THOU/UL (ref 0–0.2)
BASOPHILS NFR BLD AUTO: 0 % (ref 0–2)
BILIRUB SERPL-MCNC: 0.6 MG/DL (ref 0.2–1.3)
BILIRUB UR QL STRIP: NEGATIVE
BILIRUB UR QL STRIP: NEGATIVE
BUN SERPL-MCNC: 21 MG/DL (ref 8–28)
BUN SERPL-MCNC: 22 MG/DL (ref 8–28)
BUN SERPL-MCNC: 30 MG/DL (ref 8–28)
BUN SERPL-MCNC: 31 MG/DL (ref 7–30)
CALCIUM SERPL-MCNC: 8.1 MG/DL (ref 8.5–10.5)
CALCIUM SERPL-MCNC: 8.3 MG/DL (ref 8.5–10.5)
CALCIUM SERPL-MCNC: 8.4 MG/DL (ref 8.5–10.1)
CALCIUM SERPL-MCNC: 9.8 MG/DL (ref 8.5–10.5)
CHLORIDE BLD-SCNC: 103 MMOL/L (ref 98–107)
CHLORIDE BLD-SCNC: 107 MMOL/L (ref 98–107)
CHLORIDE BLD-SCNC: 108 MMOL/L (ref 98–107)
CHLORIDE SERPL-SCNC: 112 MMOL/L (ref 94–109)
CO2 SERPL-SCNC: 22 MMOL/L (ref 20–32)
CO2 SERPL-SCNC: 23 MMOL/L (ref 22–31)
CO2 SERPL-SCNC: 23 MMOL/L (ref 22–31)
CO2 SERPL-SCNC: 24 MMOL/L (ref 22–31)
COLOR UR AUTO: YELLOW
COLOR UR AUTO: YELLOW
CREAT SERPL-MCNC: 0.68 MG/DL (ref 0.6–1.1)
CREAT SERPL-MCNC: 0.8 MG/DL (ref 0.52–1.04)
CREAT SERPL-MCNC: 0.83 MG/DL (ref 0.6–1.1)
CREAT SERPL-MCNC: 0.96 MG/DL (ref 0.6–1.1)
EOSINOPHIL # BLD AUTO: 0.2 THOU/UL (ref 0–0.4)
EOSINOPHIL NFR BLD AUTO: 2 % (ref 0–6)
ERYTHROCYTE [DISTWIDTH] IN BLOOD BY AUTOMATED COUNT: 13 % (ref 11–14.5)
ERYTHROCYTE [DISTWIDTH] IN BLOOD BY AUTOMATED COUNT: 13.1 % (ref 11–14.5)
ERYTHROCYTE [DISTWIDTH] IN BLOOD BY AUTOMATED COUNT: 13.5 % (ref 10–15)
ERYTHROCYTE [DISTWIDTH] IN BLOOD BY AUTOMATED COUNT: 15.6 % (ref 11–14.5)
FERRITIN SERPL-MCNC: 115 NG/ML (ref 10–130)
GFR SERPL CREATININE-BSD FRML MDRD: 56 ML/MIN/1.73M2
GFR SERPL CREATININE-BSD FRML MDRD: 69 ML/MIN/1.7M2
GFR SERPL CREATININE-BSD FRML MDRD: >60 ML/MIN/1.73M2
GFR SERPL CREATININE-BSD FRML MDRD: >60 ML/MIN/1.73M2
GLUCOSE BLD-MCNC: 104 MG/DL (ref 70–125)
GLUCOSE BLD-MCNC: 168 MG/DL (ref 70–125)
GLUCOSE BLD-MCNC: 74 MG/DL (ref 70–125)
GLUCOSE SERPL-MCNC: 138 MG/DL (ref 70–99)
GLUCOSE UR STRIP-MCNC: NEGATIVE MG/DL
GLUCOSE UR STRIP-MCNC: NEGATIVE MG/DL
HCT VFR BLD AUTO: 20.2 % (ref 35–47)
HCT VFR BLD AUTO: 31.7 % (ref 35–47)
HCT VFR BLD AUTO: 38 % (ref 35–47)
HCT VFR BLD AUTO: 39.1 % (ref 35–47)
HGB BLD-MCNC: 10 G/DL (ref 12–16)
HGB BLD-MCNC: 12.2 G/DL (ref 11.7–15.7)
HGB BLD-MCNC: 12.9 G/DL (ref 12–16)
HGB BLD-MCNC: 6.9 G/DL (ref 12–16)
HGB BLD-MCNC: 9.9 G/DL (ref 12–16)
HGB UR QL STRIP: ABNORMAL
HGB UR QL STRIP: ABNORMAL
HYALINE CASTS #/AREA URNS LPF: ABNORMAL LPF
HYALINE CASTS #/AREA URNS LPF: ABNORMAL LPF
INR PPP: 0.97 (ref 0.9–1.1)
INR PPP: 1.2 (ref 0.9–1.1)
INR PPP: 1.6 (ref 0.9–1.1)
INR PPP: 2.81 (ref 0.86–1.14)
INR PPP: 2.9 (ref 0.86–1.14)
INR PPP: 2.9 (ref 0.9–1.1)
INR PPP: 3 (ref 0.86–1.14)
INR PPP: 3.1 (ref 0.9–1.1)
KETONES UR STRIP-MCNC: NEGATIVE MG/DL
KETONES UR STRIP-MCNC: NEGATIVE MG/DL
LEUKOCYTE ESTERASE UR QL STRIP: ABNORMAL
LEUKOCYTE ESTERASE UR QL STRIP: ABNORMAL
LEVETIRACETAM (KEPPRA): 24.1 UG/ML (ref 6–46)
LEVETIRACETAM (KEPPRA): 27.8 UG/ML (ref 6–46)
LEVETIRACETAM (KEPPRA): 29 UG/ML (ref 6–46)
LYMPHOCYTES # BLD AUTO: 1.2 THOU/UL (ref 0.8–4.4)
LYMPHOCYTES NFR BLD AUTO: 13 % (ref 20–40)
MCH RBC QN AUTO: 30 PG (ref 27–34)
MCH RBC QN AUTO: 30.1 PG (ref 27–34)
MCH RBC QN AUTO: 30.3 PG (ref 26.5–33)
MCH RBC QN AUTO: 30.6 PG (ref 27–34)
MCHC RBC AUTO-ENTMCNC: 31.2 G/DL (ref 32–36)
MCHC RBC AUTO-ENTMCNC: 32.1 G/DL (ref 31.5–36.5)
MCHC RBC AUTO-ENTMCNC: 33.1 G/DL (ref 32–36)
MCHC RBC AUTO-ENTMCNC: 33.9 G/DL (ref 32–36)
MCV RBC AUTO: 89 FL (ref 80–100)
MCV RBC AUTO: 91 FL (ref 80–100)
MCV RBC AUTO: 95 FL (ref 78–100)
MCV RBC AUTO: 98 FL (ref 80–100)
MONOCYTES # BLD AUTO: 0.7 THOU/UL (ref 0–0.9)
MONOCYTES NFR BLD AUTO: 7 % (ref 2–10)
MUCOUS THREADS #/AREA URNS LPF: ABNORMAL LPF
MUCOUS THREADS #/AREA URNS LPF: ABNORMAL LPF
NEUTROPHILS # BLD AUTO: 7.6 THOU/UL (ref 2–7.7)
NEUTROPHILS NFR BLD AUTO: 79 % (ref 50–70)
NITRATE UR QL: NEGATIVE
NITRATE UR QL: NEGATIVE
PH UR STRIP: 5 [PH] (ref 4.5–8)
PH UR STRIP: 6 [PH] (ref 4.5–8)
PLATELET # BLD AUTO: 242 10E9/L (ref 150–450)
PLATELET # BLD AUTO: 271 THOU/UL (ref 140–440)
PLATELET # BLD AUTO: 341 THOU/UL (ref 140–440)
PLATELET # BLD AUTO: 94 THOU/UL (ref 140–440)
PMV BLD AUTO: 10.6 FL (ref 7–10)
PMV BLD AUTO: 11.4 FL (ref 8.5–12.5)
PMV BLD AUTO: 7.8 FL (ref 7–10)
POTASSIUM BLD-SCNC: 4.3 MMOL/L (ref 3.5–5)
POTASSIUM BLD-SCNC: 4.6 MMOL/L (ref 3.5–5)
POTASSIUM BLD-SCNC: 5 MMOL/L (ref 3.5–5)
POTASSIUM SERPL-SCNC: 4 MMOL/L (ref 3.4–5.3)
PROT SERPL-MCNC: 6.4 G/DL (ref 6.8–8.8)
RBC # BLD AUTO: 2.28 MILL/UL (ref 3.8–5.4)
RBC # BLD AUTO: 3.24 MILL/UL (ref 3.8–5.4)
RBC # BLD AUTO: 4.02 10E12/L (ref 3.8–5.2)
RBC # BLD AUTO: 4.32 MILL/UL (ref 3.8–5.4)
RBC #/AREA URNS AUTO: ABNORMAL HPF
RBC #/AREA URNS AUTO: ABNORMAL HPF
SODIUM SERPL-SCNC: 137 MMOL/L (ref 136–145)
SODIUM SERPL-SCNC: 140 MMOL/L (ref 136–145)
SODIUM SERPL-SCNC: 143 MMOL/L (ref 133–144)
SODIUM SERPL-SCNC: 143 MMOL/L (ref 136–145)
SP GR UR STRIP: 1.01 (ref 1–1.03)
SP GR UR STRIP: 1.02 (ref 1–1.03)
SQUAMOUS #/AREA URNS AUTO: ABNORMAL LPF
SQUAMOUS #/AREA URNS AUTO: ABNORMAL LPF
TRANS CELLS #/AREA URNS HPF: ABNORMAL LPF
TRANS CELLS #/AREA URNS HPF: ABNORMAL LPF
UROBILINOGEN UR STRIP-ACNC: ABNORMAL
UROBILINOGEN UR STRIP-ACNC: ABNORMAL
WBC # BLD AUTO: 6.6 10E9/L (ref 4–11)
WBC #/AREA URNS AUTO: ABNORMAL HPF
WBC #/AREA URNS AUTO: ABNORMAL HPF
WBC: 4.2 THOU/UL (ref 4–11)
WBC: 9.3 THOU/UL (ref 4–11)
WBC: 9.7 THOU/UL (ref 4–11)

## 2018-01-01 PROCEDURE — 25000132 ZZH RX MED GY IP 250 OP 250 PS 637: Mod: GY | Performed by: STUDENT IN AN ORGANIZED HEALTH CARE EDUCATION/TRAINING PROGRAM

## 2018-01-01 PROCEDURE — A9270 NON-COVERED ITEM OR SERVICE: HCPCS | Mod: GY | Performed by: STUDENT IN AN ORGANIZED HEALTH CARE EDUCATION/TRAINING PROGRAM

## 2018-01-01 PROCEDURE — 85610 PROTHROMBIN TIME: CPT | Performed by: PSYCHIATRY & NEUROLOGY

## 2018-01-01 PROCEDURE — A9270 NON-COVERED ITEM OR SERVICE: HCPCS | Mod: GY | Performed by: PSYCHIATRY & NEUROLOGY

## 2018-01-01 PROCEDURE — 25000132 ZZH RX MED GY IP 250 OP 250 PS 637: Mod: GY | Performed by: PSYCHIATRY & NEUROLOGY

## 2018-01-01 PROCEDURE — 95951 ZZHC EEG VIDEO EACH 24 HR: CPT | Mod: ZF

## 2018-01-01 PROCEDURE — 80053 COMPREHEN METABOLIC PANEL: CPT | Performed by: PSYCHIATRY & NEUROLOGY

## 2018-01-01 PROCEDURE — 36415 COLL VENOUS BLD VENIPUNCTURE: CPT | Performed by: PSYCHIATRY & NEUROLOGY

## 2018-01-01 PROCEDURE — 95951 ZZHC EEG VIDEO < 12 HR: CPT | Mod: 52,ZF

## 2018-01-01 PROCEDURE — 85027 COMPLETE CBC AUTOMATED: CPT | Performed by: PSYCHIATRY & NEUROLOGY

## 2018-01-01 PROCEDURE — G0378 HOSPITAL OBSERVATION PER HR: HCPCS

## 2018-01-01 PROCEDURE — 12000008 ZZH R&B INTERMEDIATE UMMC

## 2018-01-01 RX ORDER — IBUPROFEN 200 MG
200 TABLET ORAL 3 TIMES DAILY
Status: DISCONTINUED | OUTPATIENT
Start: 2018-01-01 | End: 2018-01-01 | Stop reason: HOSPADM

## 2018-01-01 RX ORDER — AMOXICILLIN 250 MG
1 CAPSULE ORAL 2 TIMES DAILY PRN
Status: DISCONTINUED | OUTPATIENT
Start: 2018-01-01 | End: 2018-01-01 | Stop reason: HOSPADM

## 2018-01-01 RX ORDER — WARFARIN SODIUM 1 MG/1
3 TABLET ORAL
Status: DISCONTINUED | OUTPATIENT
Start: 2018-01-01 | End: 2018-01-01 | Stop reason: HOSPADM

## 2018-01-01 RX ORDER — AMLODIPINE BESYLATE 10 MG/1
10 TABLET ORAL DAILY
Status: DISCONTINUED | OUTPATIENT
Start: 2018-01-01 | End: 2018-01-01 | Stop reason: HOSPADM

## 2018-01-01 RX ORDER — ATORVASTATIN CALCIUM 40 MG/1
40 TABLET, FILM COATED ORAL DAILY
Qty: 30 TABLET | DISCHARGE
Start: 2018-01-01

## 2018-01-01 RX ORDER — BISACODYL 10 MG
10 SUPPOSITORY, RECTAL RECTAL DAILY PRN
Status: DISCONTINUED | OUTPATIENT
Start: 2018-01-01 | End: 2018-01-01 | Stop reason: HOSPADM

## 2018-01-01 RX ORDER — ATORVASTATIN CALCIUM 20 MG/1
40 TABLET, FILM COATED ORAL DAILY
Status: DISCONTINUED | OUTPATIENT
Start: 2018-01-01 | End: 2018-01-01 | Stop reason: HOSPADM

## 2018-01-01 RX ORDER — POLYETHYLENE GLYCOL 3350 17 G/17G
17 POWDER, FOR SOLUTION ORAL DAILY PRN
Status: DISCONTINUED | OUTPATIENT
Start: 2018-01-01 | End: 2018-01-01 | Stop reason: HOSPADM

## 2018-01-01 RX ORDER — ACETAMINOPHEN 325 MG/1
650 TABLET ORAL 4 TIMES DAILY
Status: DISCONTINUED | OUTPATIENT
Start: 2018-01-01 | End: 2018-01-01 | Stop reason: HOSPADM

## 2018-01-01 RX ORDER — NALOXONE HYDROCHLORIDE 0.4 MG/ML
.1-.4 INJECTION, SOLUTION INTRAMUSCULAR; INTRAVENOUS; SUBCUTANEOUS
Status: DISCONTINUED | OUTPATIENT
Start: 2018-01-01 | End: 2018-01-01 | Stop reason: HOSPADM

## 2018-01-01 RX ORDER — WARFARIN SODIUM 3 MG/1
3 TABLET ORAL DAILY
Qty: 30 TABLET | Refills: 1 | Status: SHIPPED | OUTPATIENT
Start: 2018-01-01

## 2018-01-01 RX ORDER — IBUPROFEN 200 MG
200 TABLET ORAL 3 TIMES DAILY
Qty: 100 TABLET | DISCHARGE
Start: 2018-01-01

## 2018-01-01 RX ORDER — ACETAMINOPHEN 325 MG/1
650 TABLET ORAL EVERY 4 HOURS PRN
Status: DISCONTINUED | OUTPATIENT
Start: 2018-01-01 | End: 2018-01-01

## 2018-01-01 RX ORDER — LEVETIRACETAM 500 MG/1
1000 TABLET ORAL 2 TIMES DAILY
Status: DISCONTINUED | OUTPATIENT
Start: 2018-01-01 | End: 2018-01-01 | Stop reason: HOSPADM

## 2018-01-01 RX ORDER — AMOXICILLIN 250 MG
2 CAPSULE ORAL 2 TIMES DAILY PRN
Status: DISCONTINUED | OUTPATIENT
Start: 2018-01-01 | End: 2018-01-01 | Stop reason: HOSPADM

## 2018-01-01 RX ORDER — WARFARIN SODIUM 1 MG/1
3 TABLET ORAL ONCE
Status: COMPLETED | OUTPATIENT
Start: 2018-01-01 | End: 2018-01-01

## 2018-01-01 RX ORDER — LEVETIRACETAM 1000 MG/1
1000 TABLET ORAL 2 TIMES DAILY
Qty: 60 TABLET | DISCHARGE
Start: 2018-01-01

## 2018-01-01 RX ADMIN — WARFARIN SODIUM 3 MG: 1 TABLET ORAL at 21:27

## 2018-01-01 RX ADMIN — IBUPROFEN 200 MG: 200 TABLET, FILM COATED ORAL at 14:32

## 2018-01-01 RX ADMIN — ACETAMINOPHEN 650 MG: 325 TABLET, FILM COATED ORAL at 11:00

## 2018-01-01 RX ADMIN — ACETAMINOPHEN 650 MG: 325 TABLET, FILM COATED ORAL at 22:05

## 2018-01-01 RX ADMIN — WARFARIN SODIUM 3.5 MG: 3 TABLET ORAL at 17:52

## 2018-01-01 RX ADMIN — LEVETIRACETAM 1000 MG: 500 TABLET ORAL at 08:27

## 2018-01-01 RX ADMIN — SERTRALINE HYDROCHLORIDE 50 MG: 50 TABLET ORAL at 10:59

## 2018-01-01 RX ADMIN — AMLODIPINE BESYLATE 10 MG: 10 TABLET ORAL at 08:28

## 2018-01-01 RX ADMIN — ATORVASTATIN CALCIUM 40 MG: 20 TABLET, FILM COATED ORAL at 09:16

## 2018-01-01 RX ADMIN — LEVETIRACETAM 1000 MG: 500 TABLET ORAL at 09:16

## 2018-01-01 RX ADMIN — LEVETIRACETAM 1000 MG: 500 TABLET ORAL at 21:27

## 2018-01-01 RX ADMIN — ACETAMINOPHEN 650 MG: 325 TABLET, FILM COATED ORAL at 07:44

## 2018-01-01 RX ADMIN — IBUPROFEN 200 MG: 200 TABLET, FILM COATED ORAL at 08:27

## 2018-01-01 RX ADMIN — IBUPROFEN 200 MG: 200 TABLET, FILM COATED ORAL at 10:25

## 2018-01-01 RX ADMIN — LEVETIRACETAM 1000 MG: 500 TABLET ORAL at 20:23

## 2018-01-01 RX ADMIN — IBUPROFEN 200 MG: 200 TABLET, FILM COATED ORAL at 20:23

## 2018-01-01 RX ADMIN — ACETAMINOPHEN 650 MG: 325 TABLET, FILM COATED ORAL at 13:14

## 2018-01-01 RX ADMIN — ATORVASTATIN CALCIUM 40 MG: 20 TABLET, FILM COATED ORAL at 08:28

## 2018-01-01 RX ADMIN — AMLODIPINE BESYLATE 10 MG: 10 TABLET ORAL at 09:16

## 2018-01-01 RX ADMIN — ACETAMINOPHEN 650 MG: 325 TABLET, FILM COATED ORAL at 16:46

## 2018-01-01 ASSESSMENT — VISUAL ACUITY
OU: NORMAL ACUITY
OU: NORMAL ACUITY

## 2018-01-01 ASSESSMENT — MIFFLIN-ST. JEOR: SCORE: 1167.14

## 2018-01-01 ASSESSMENT — PAIN DESCRIPTION - DESCRIPTORS: DESCRIPTORS: CONSTANT;ACHING

## 2018-01-26 PROBLEM — R56.9 SEIZURES (H): Status: ACTIVE | Noted: 2018-01-01

## 2018-01-26 PROBLEM — R25.1 TREMOR: Status: ACTIVE | Noted: 2018-01-01

## 2018-01-26 NOTE — IP AVS SNAPSHOT
` `     [address] [facility] BANK: [phone]            Medication Administration Report for Marcia Kelley as of 01/28/18 1119   Legend:    Given Hold Not Given Due Canceled Entry Other Actions    Time Time (Time) Time  Time-Action       Inactive    Active    Linked        Medications 01/22/18 01/23/18 01/24/18 01/25/18 01/26/18 01/27/18 01/28/18    acetaminophen (TYLENOL) tablet 650 mg  Dose: 650 mg  Freq: 4 TIMES DAILY Route: PO  Start: 01/27/18 1200   Admin Instructions: Alternate ibuprofen (if ordered) with acetaminophen.  Maximum acetaminophen dose from all sources = 75 mg/kg/day not to exceed 4 grams/day.          1314 (650 mg)-Given       1646 (650 mg)-Given       2205 (650 mg)-Given        (0828)-Not Given       1100 (650 mg)-Given       [ ] 1600       [ ] 2200           amLODIPine (NORVASC) tablet 10 mg  Dose: 10 mg  Freq: DAILY Route: PO  Start: 01/27/18 0800         0916 (10 mg)-Given        0828 (10 mg)-Given           atorvastatin (LIPITOR) tablet 40 mg  Dose: 40 mg  Freq: DAILY Route: PO  Start: 01/27/18 0800         0916 (40 mg)-Given        0828 (40 mg)-Given           bisacodyl (DULCOLAX) Suppository 10 mg  Dose: 10 mg  Freq: DAILY PRN Route: RE  PRN Reason: constipation  Start: 01/26/18 1851   Admin Instructions: Hold for loose stools.  This is the third step of a three step constipation treatment.               ibuprofen (ADVIL/MOTRIN) tablet 200 mg  Dose: 200 mg  Freq: 3 TIMES DAILY Route: PO  Start: 01/27/18 1000         1025 (200 mg)-Given       1432 (200 mg)-Given       2023 (200 mg)-Given        0827 (200 mg)-Given       [ ] 1400       [ ] 2000           levETIRAcetam (KEPPRA) tablet 1,000 mg  Dose: 1,000 mg  Freq: 2 TIMES DAILY Route: PO  Start: 01/26/18 2045        2127 (1,000 mg)-Given        0916 (1,000 mg)-Given       2023 (1,000 mg)-Given        0827 (1,000 mg)-Given       [ ] 2000           naloxone (NARCAN) injection 0.1-0.4 mg  Dose: 0.1-0.4 mg  Freq: EVERY 2 MIN PRN Route:  IV  PRN Reason: opioid reversal  Start: 01/26/18 1848   Admin Instructions: For respiratory rate LESS than or EQUAL to 8.  Partial reversal dose:  0.1 mg titrated q 2 minutes for Analgesia Side Effects Monitoring Sedation Level of 3 (frequently drowsy, arousable, drifts to sleep during conversation).Full reversal dose:  0.4 mg bolus for Analgesia Side Effects Monitoring Sedation Level of 4 (somnolent, minimal or no response to stimulation).  For ordered doses up to 2mg give IVP. Give each 0.4mg over 15 seconds in emergency situations. For non-emergent situations further dilute in 9mL of NS to facilitate titration of response.               polyethylene glycol (MIRALAX/GLYCOLAX) Packet 17 g  Dose: 17 g  Freq: DAILY PRN Route: PO  PRN Reason: constipation  Start: 01/26/18 1851   Admin Instructions: Give in 8oz of  water, juice, or soda. Hold for loose stools.  This is the second step of a three step constipation treatment.  1 Packet = 17 grams. Mixed prescribed dose in 8 ounces of water. Follow with 8 oz. of water.               senna-docusate (SENOKOT-S;PERICOLACE) 8.6-50 MG per tablet 1 tablet  Dose: 1 tablet  Freq: 2 TIMES DAILY PRN Route: PO  PRN Reason: constipation  Start: 01/26/18 1851   Admin Instructions: If no bowel movement in 24 hours, increase to 2 tablets PO.  Hold for loose stools.  This is the first step of a three step constipation treatment.              Or  senna-docusate (SENOKOT-S;PERICOLACE) 8.6-50 MG per tablet 2 tablet  Dose: 2 tablet  Freq: 2 TIMES DAILY PRN Route: PO  PRN Reason: constipation  Start: 01/26/18 1851   Admin Instructions: Hold for loose stools.  This is the first step of a three step constipation treatment.               sertraline (ZOLOFT) tablet 50 mg  Dose: 50 mg  Freq: DAILY Route: PO  Start: 01/28/18 1030          1059 (50 mg)-Given           Warfarin Therapy Reminder (Check START DATE - warfarin may be starting in the FUTURE)  Dose: 1 each  Freq: CONTINUOUS PRN Route:  XX  Start: 01/26/18 2004   Admin Instructions: *Note to reorder warfarin daily*  Pharmacy Warfarin Dosing Service  Patient is on Warfarin Therapy - check for daily order              Future Medications  Medications 01/22/18 01/23/18 01/24/18 01/25/18 01/26/18 01/27/18 01/28/18       warfarin (COUMADIN) tablet 3 mg  Dose: 3 mg  Freq: ONCE AT 6PM Route: PO  Start: 01/28/18 1800          [ ] 1800          Completed Medications  Medications 01/22/18 01/23/18 01/24/18 01/25/18 01/26/18 01/27/18 01/28/18         Dose: 3.5 mg  Freq: ONCE AT 6PM Route: PO  Start: 01/27/18 1800   End: 01/27/18 1752         1752 (3.5 mg)-Given              Dose: 3 mg  Freq: ONCE Route: PO  Start: 01/26/18 2130   End: 01/26/18 2127 2127 (3 mg)-Given            Discontinued Medications  Medications 01/22/18 01/23/18 01/24/18 01/25/18 01/26/18 01/27/18 01/28/18         Dose: 650 mg  Freq: EVERY 4 HOURS PRN Route: PO  PRN Reason: mild pain  Start: 01/26/18 1851   End: 01/27/18 0939   Admin Instructions: Alternate ibuprofen (if ordered) with acetaminophen.  Maximum acetaminophen dose from all sources = 75 mg/kg/day not to exceed 4 grams/day.          0744 (650 mg)-Given       0939-Med Discontinued          Dose: 325 mg  Freq: DAILY Route: PO  Start: 01/26/18 1915   End: 01/26/18 2002   Admin Instructions: DO NOT CRUSH.         2002-Med Discontinued  (2023)-Not Given

## 2018-01-26 NOTE — IP AVS SNAPSHOT
` ` Patient Information     Patient Name Sex     Marcia Kelley ([id number]) Female 1940       Room Bed    7274 [id number]      Patient Demographics     Address Phone    [address] [place] [address] [phone] (Home)      Patient Ethnicity & Race     Ethnic Group Patient Race    American White      Emergency Contact(s)     Name Relation Home Work Mobile    Oj Kelley Spouse [phone]    Jocelyne Whitfield Daughter   [phone]    Ivy Kelley Daughter   [phone]    Leah Fleming Daughter [phone]      Documents on File        Status Date Received Description       Documents for the Patient    Affiliate Privacy placeholder   phase3    Consent for EHR Access Received 17     Insurance Card       External Medication Information Consent       Patient ID       [facility] Specified Other       Consent for Services/Privacy Notice - Hospital/Clinic Received 17     Privacy Notice - [place] Received 17     HIM CHARLES Authorization  17     Care Everywhere Prospective Auth Received 18        Documents for the Encounter    CMS IM for Patient Signature Received 18       Admission Information     Attending Provider Admitting Provider Admission Type Admission Date/Time    Alec Lipscomb MD Vossel, Keith Alan, MD Urgent 18  1820    Discharge Date Hospital Service Auth/Cert Status Service Area     Neurology [facility]    Unit Room/Bed Admission Status       UU U7B 7228/7228- Admission (Confirmed)       Admission     Complaint    Seizure disorder, Tremor, Tremor      Hospital Account     Name Acct ID Class Status Primary Coverage    Marcia Kelley [id number] Observation Open MEDICARE - MEDICARE FOR HB SUPPLEMENT            Guarantor Account (for Hospital Account #[id number])     Name Relation to Pt Service Area Active? Acct Type    Marcia Kelley Self FCS Yes Personal/Family    Address  Phone          [address] [place] [address] [phone](H)              Coverage Information (for Hospital Account #[id number])     1. MEDICARE/MEDICARE FOR HB SUPPLEMENT     F/O Payor/Plan Precert #    MEDICARE/MEDICARE FOR HB SUPPLEMENT     Subscriber Subscriber #    Marcia Kelley [id number]    Address Phone    [employer]  [address]  [place] [address] [phone]          2. HEALTHPARTNERS/HEALTH[employer]ERS FREEDOM PLAN     F/O Payor/Plan Precert #    HEALTHPARTNERS/HEALTH[employer]NERS FREEDOM PLAN     Subscriber Subscriber #    Marcia Kelley [id number]    Address Phone    [address]  [place] [address] [phone]

## 2018-01-26 NOTE — MR AVS SNAPSHOT
After Visit Summary   1/26/2018    Marcia Kelley    MRN: [id number]           Patient Information     Date Of Birth          1940        Visit Information        Provider Department      1/26/2018 8:00 PM [facility] EEG TECH 4 [facility] EEG        Today's Diagnoses     Seizures (H)    -  1       Follow-ups after your visit        Your next 10 appointments already scheduled     Jan 27, 2018  7:00 AM CST   24 Hour Video Visit with [facility] EEG TECH 4   [facility] EEG ([facility])    [facility]  [address] [address]   [phone]           [place]: Your appointment is scheduled at [facility]. [address] [place] [address]              Future tests that were ordered for you today     Open Standing Orders        Priority Remaining Interval Expires Ordered    INR Routine 16/16 DAILY  1/26/2018    Activity: Up with assist Routine [id number] PRN  1/26/2018            Who to contact     Please call your clinic at [phone] to:    Ask questions about your health    Make or cancel appointments    Discuss your medicines    Learn about your test results    Speak to your doctor   If you have compliments or concerns about an experience at your clinic, or if you wish to file a complaint, please contact [facility] Patient Relations at [phone] or email us at [email or web address]         Additional Information About Your Visit        MyChart Information     Orderlordt is an electronic gateway that provides easy, online access to your medical records. With Zazum, you can request a clinic appointment, read your test results, renew a prescription or communicate with your care team.     To sign up for Orderlordt visit the website at [email or web address]   You will be asked to enter the access code listed below, as well as some personal information. Please follow the directions to create  your username and password.     Your access code is: [email or web address]  Expires: 2018  1:01 PM     Your access code will  in 90 days. If you need help or a new code, please contact your [facility] or call [phone] for assistance.        Care EveryWhere ID     This is your Care EveryWhere ID. This could be used by other organizations to access your [place] medical records  [id number]         Blood Pressure from Last 3 Encounters:   18 112/51   17 137/74    Weight from Last 3 Encounters:   17 76.9 kg (169 lb 8.5 oz)              Today, you had the following     No orders found for display         Today's Medication Changes      Notice     This visit is during an admission. Changes to the med list made in this visit will be reflected in the After Visit Summary of the admission.             Primary Care Provider    None Specified       No primary provider on file.        Equal Access to Services     JULIEN CARDOSO : Bernadine Clifford, wareid celestin, qachuckyta kaalmada claudy, orlin dow . So [facility] [phone].    ATENCIÓN: Si habla español, tiene a whittaker disposición servicios gratuitos de asistencia lingüística. Llame al [phone].    We comply with applicable federal civil rights laws and [place] laws. We do not discriminate on the basis of race, color, national origin, age, disability, sex, sexual orientation, or gender identity.            Thank you!     Thank you for choosing [facility]  for your care. Our goal is always to provide you with excellent care. Hearing back from our patients is one way we can continue to improve our services. Please take a few minutes to complete the written survey that you may receive in the mail after your visit with us. Thank you!             Your Updated Medication List - Protect others around you: Learn how to safely use, store and throw away your medicines at www.disposemymeds.org.       Notice     This visit is during an admission. Changes to the med list made in this visit will be reflected in the After Visit Summary of the admission.

## 2018-01-26 NOTE — LETTER
2018    Sebastian Pulido MD  [facility]  [address]  [address]  [place] [address]    Fax [phone]       RE: Marcia Kelley  : 1940   MRN: [id number]      Dear Dr Pulido,    I would like to provide some followup on Marcia's situation. Unfortunately her admission was delayed a little because the hospital was full but we were able to admit her within 24 hours of our discussion and my colleagues in hospital including our epileptologist on hospital duty were able to complete an evaluation. We did review outside imaging and agree with your conclusions so these were not repeated.    Video-monitoring did not reveal epileptiform activity, seizures, or PLEDs during the entire period of recording. The unusual left sided movements were more or less continuous while staff and family were in attendance but did stop during sleep and tended to reduce significantly when she was alone. The clinical appearance of the movements including response to manipulation, etc, was not felt to be consistent with EPC. Moderate pain was noted with ranging of her left shoulder and it was thought that she perhaps may have some component for frozen shoulder that could be contributing to her symptoms.    Results were discussed with patient and family and she was reassured that her attacks were not epileptic seizures. She was told that they may be related to the stresses and changes of body perception associated with the CVA. We took the liberty of discontinuing her zonisamide. We have continued treatment with levetiracetam and sertraline at her admission doses. She was transferred to TCU for further rehabilitation and other appropriate cares.    We appreciate the opportunity of participating in her care. Please call if you have questions.    Sincerely,      Moreno Ramesh MD

## 2018-01-26 NOTE — IP AVS SNAPSHOT
` `           [address] [facility] [facility]: [phone]                                              INTERAGENCY TRANSFER FORM - NURSING   2018                    Hospital Admission Date: 2018  FRAN YAO   : 1940  Sex: Female        Attending Provider: Alec Lipscomb MD     Allergies:  No Known Allergies    Infection:  None   Service:  NEUROLOGY    Ht:  --   Wt:  --   Admission Wt:  --    BMI:  --   BSA:  --            Patient PCP Information     None on File      Current Code Status     Date Active Code Status Order ID Comments User Context       Prior      Code Status History     Date Active Date Inactive Code Status Order ID Comments User Context    2018 10:24 AM  Full Code [id number]  Kashmir Fowler MD Outpatient    2018  6:36 PM 2018 10:24 AM Full Code [id number]  Nuria Jones, DO Outpatient    2018  6:54 PM 2018  6:36 PM Full Code [id number]  Bam Fermin MD Inpatient    3/31/2017  8:43 AM 2018  6:54 PM Full Code [id number]  Dipak uLcero, DO Outpatient    3/21/2017  5:57 PM 3/31/2017  8:43 AM Full Code [id number]  Clifton Moore MD Inpatient      Advance Directives        Scanned docmt in ACP Activity?           No scanned doc        Hospital Problems as of 2018              Priority Class Noted POA    Tremor Medium  2018 Yes      Non-Hospital Problems as of 2018              Priority Class Noted    CVA (cerebral vascular accident) (H) Medium  3/21/2017    Abnormal movement Medium  2018      Immunizations     None         END      ASSESSMENT     Discharge Profile Flowsheet     DISCHARGE NEEDS ASSESSMENT     Inspection under devices  Full 18 0206    Equipment Currently Used at Home  none 17 1530   Skin WDL  WDL 18 0115    GASTROINTESTINAL (ADULT,PEDIATRIC,OB)     Skin Color/Characteristics  bruised (ecchymotic) 18 0206    GI WDL  WDL 18 0115   Skin  "Temperature  warm 01/27/18 0206    Last Bowel Movement  01/26/18 01/28/18 0115   Skin Moisture  moist 01/27/18 0206    Passing flatus  yes 01/27/18 1106   Skin Elasticity  quick return to original state 01/26/18 0649    COMMUNICATION ASSESSMENT     Skin Integrity  abrasion(s);drain/device(s);scar(s);wound(s) 01/27/18 1106    Patient's communication style  spoken language (English or Bilingual) 03/21/17 1327   SAFETY      SKIN     Safety WDL  WDL 01/28/18 0115    Inspection of bony prominences  Full 01/27/18 1106   All Alarms  none present 01/28/18 0115                 Assessment WDL (Within Defined Limits) Definitions           Safety WDL     Effective: 09/28/15    Row Information: <b>WDL Definition:</b> Bed in low position, wheels locked; call light in reach; upper side rails up x 2; ID band on<br> <font color=\"gray\"><i>Item=AS safety wdl>>List=AS safety wdl>>Version=F14</i></font>      Skin WDL     Effective: 09/28/15    Row Information: <b>WDL Definition:</b> Warm; dry; intact; elastic; without discoloration; pressure points without redness<br> <font color=\"gray\"><i>Item=AS skin wdl>>List=AS skin wdl>>Version=F14</i></font>      Vitals     Vital Signs Flowsheet     VITAL SIGNS     Preferred Pain Scale  CAPA (Clinically Aligned Pain Assessment) ([facility], [facility] Adults Only) 01/27/18 0929    Temp  96.6  F (35.9  C) 01/28/18 0749   Pain Location  Shoulder 01/27/18 0929    Temp src  Oral 01/28/18 0749   Pain Orientation  Left 01/27/18 0929    Resp  16 01/28/18 0749   Pain Descriptors  Constant;Aching 01/27/18 0929    Pulse  (!)  45 01/28/18 0749   Pain Intervention(s)  Medication (See eMAR) 01/27/18 2221    Pulse/Heart Rate Source  Monitor 01/28/18 0749   Response to Interventions  Relief 01/27/18 1103    BP  106/56 01/28/18 0749   POSITIONING      BP Location  Right arm 01/28/18 0749   Body Position  independently positioning 01/27/18 1112    OXYGEN THERAPY     Head of Bed (HOB)  HOB at 30 degrees 01/27/18 " 0206    SpO2  95 % 01/28/18 0749   DAILY CARE      O2 Device  None (Room air) 01/28/18 0749   Activity Management  activity encouraged 01/27/18 1112    PAIN/COMFORT     Activity Assistance Provided  assistance, 1 person 01/27/18 0206    Patient Currently in Pain  yes 01/27/18 2221                 Patient Lines/Drains/Airways Status    Active LINES/DRAINS/AIRWAYS     Name: Placement date: Placement time: Site: Days: Last dressing change:    Peripheral IV 01/26/18 01/26/18         2             Patient Lines/Drains/Airways Status    Active PICC/CVC     None            Intake/Output Detail Report     Date Intake Output Net    Shift P.O. Total Urine Total       Day 01/27/18 0000 - 01/27/18 0659 -- -- 220 220 -220    Iris 01/27/18 0700 - 01/27/18 1459 380 380 200 200 180    Noc 01/27/18 1500 - 01/27/18 2359 540 540 100 100 440    Day 01/28/18 0000 - 01/28/18 0659 -- -- -- -- 0    Iris 01/28/18 0700 - 01/28/18 1459 -- -- -- -- 0      Last Void/BM       Most Recent Value    Urine Occurrence 1 at 01/27/2018 2200    Stool Occurrence       Case Management/Discharge Planning     Case Management/Discharge Planning Flowsheet     REFERRAL INFORMATION     Facility 1 Name  [facility] 01/28/18 1028    Arrived From  admitted as an inpatient 03/27/17 1601   FINAL RESOURCES      LIVING ENVIRONMENT     Equipment Currently Used at Home  none 03/24/17 1530    Lives With  spouse 01/26/18 2241   ABUSE RISK SCREEN      Living Arrangements  [place] 03/27/17 1601   QUESTION TO PATIENT:  Has a member of your family or a partner(now or in the past) intimidated, hurt, manipulated, or controlled you in any way?  no 01/26/18 2241    COPING/STRESS     QUESTION TO PATIENT: Do you feel safe going back to the place where you are living?  yes 01/26/18 2241    Major Change/Loss/Stressor  illness 01/26/18 2243   OBSERVATION: Is there reason to believe there has been maltreatment of a vulnerable adult (ie. Physical/Sexual/Emotional abuse, self neglect,  lack of adequate food, shelter, medical care, or financial exploitation)?  no 01/26/18 2241    PATIENT PLACEMENT INFORMATION     OTHER      Did the patient choose [facility]?  No 01/28/18 1028   Are you depressed or being treated for depression?  Yes 01/26/18 2246

## 2018-01-26 NOTE — IP AVS SNAPSHOT
` `     [address] [facility] BANK: [phone]                 INTERAGENCY TRANSFER FORM - NOTES (H&P, Discharge Summary, Consults, Procedures, Therapies)   2018                    Hospital Admission Date: 2018  MARCIA YAO   : 1940  Sex: Female        Patient PCP Information     None on File         History & Physicals      H&P by Alec Lipscomb MD at 2018  8:06 PM     Author:  Alec Lipscomb MD Service:  Neurology Author Type:  Physician    Filed:  2018  7:05 PM Date of Service:  2018  8:06 PM Creation Time:  2018  8:06 PM    Status:  Signed :  Alec Lipscomb MD (Physician)         [facility]  Neurology Admission  2018      Marcia Yao MRN# [id number]   YOB: 1940 Age: 77 year old      Date of Admission:  2018    Primary care provider: No primary care provider on file.         Assessment and Plan:   Assessment: Marcia Yao is a 77 year old female with history of R MCA stroke and one GTC in past who presents with uncontrollable LUE movements.      Plan:  #LUE twitching:  Patients movements are irregular, not rhythmic with at least partial entrainment.   Also EEG at outside facility apparently captured movements with no epileptiform correlate.  This makes epileptiform activity unlikely.  Her werakness in this extremity is primarily pyramidal related to MCA stroke.  Movements also go away in sleep per report.     I suspect this is conversion disorder but we will obtain EEG overnight to evaluate and consider other etiologies including tremor and myoclonus although cortical myoclonus should show up on EEG as well.   - VEEG overnight  - Continue Keppra 1000mg BID for now  - Neurochecks  - Seizure precautions    #Hx R MCA stroke:  Afib found on holter monitor per report  - Continue coumadin, pharmacy to dose  - INR    #HTN: continue amlodipine    #HLD: Continue lipitor.        Code: Full  FEN:   Regular diet  Px:  SCD's and warfarin  Dispo: 1-2 days pending eEG monitoring.      Patient discussed with Dr. Lipscomb, attending.    Jaswinder Marks  PGY-4 Neurology  [phone]            Chief Complaint:   LUE twitching       History is obtained from the patient and/or family and medical record      Marcia Kelley is a 77 year old female with history of R MCA stroke in 3/2017 felt to due to afib as well as seizure in 10/2017 who presents with uncontrollable LUE movements since 1/20/2017.   She states she overexerted herself that day, but then had sudden onset of twitching in LUE that she felt was seizure activity.  She went to OSH who did EEG that captured spell but there was no EEG correlate.  MRI brain was unchanged and MRI  C-spine showed severe bilateral neural foraminal stenosis.   There was concern that this may be conversion disorder but elected to transfer to [facility] for further work up to pursue alternative etiologies.  Since hospitalization she states movements have not improved despite additional trial of Zonegran at OSH.    She feels more fatigued and says LUE is more weak.    Movements do apparently disappear during sleep.   She feels generally weak and tired and states she has not found anything to make movements disappear or improve.   She denies new vision loss, fever, chills, diarrhea, shortness of breath or chest pain.              Past Medical History:   No past medical history on file.      R MCA stroke  HTN  HLD  Afib  Seizure         Social History:     Social History     Social History     Marital status:      Spouse name: N/A     Number of children: N/A     Years of education: N/A     Occupational History     Not on file.     Social History Main Topics     Smoking status: Never Smoker     Smokeless tobacco: Not on file     Alcohol use Yes     Drug use: No     Sexual activity: Not on file     Other Topics Concern     Not on file     Social History Narrative     No narrative on file  "            Family History:   No family history on file.  No history of seizures known.         Allergies:    No Known Allergies          Medications:     Prescription Medications as of 1/26/2018             co-enzyme Q-10 100 MG CAPS capsule Take 1 capsule (100 mg) by mouth daily    aspirin  MG EC tablet Take 1 tablet (325 mg) by mouth daily    cloNIDine (CATAPRES) 0.1 MG tablet Take 1 tablet (0.1 mg) by mouth every 4 hours as needed (SBP > 160)    amLODIPine (NORVASC) 10 MG tablet Take 1 tablet (10 mg) by mouth daily    senna-docusate (SENOKOT-S;PERICOLACE) 8.6-50 MG per tablet Take 1-2 tablets by mouth 2 times daily as needed (constipation )    potassium phosphate, monobasic, (K-PHOS) 500 MG tablet Take 1 tablet (500 mg) by mouth daily    cholecalciferol 2000 UNITS tablet Take 2,000 Units by mouth daily      Facility Administered Medications as of 1/26/2018             naloxone (NARCAN) injection 0.1-0.4 mg Inject 0.25-1 mLs (0.1-0.4 mg) into the vein every 2 minutes as needed for opioid reversal    acetaminophen (TYLENOL) tablet 650 mg Take 2 tablets (650 mg) by mouth every 4 hours as needed for mild pain    senna-docusate (SENOKOT-S;PERICOLACE) 8.6-50 MG per tablet 1 tablet Take 1 tablet by mouth 2 times daily as needed for constipation    Linked Group 1:  \"Or\" Linked Group Details     senna-docusate (SENOKOT-S;PERICOLACE) 8.6-50 MG per tablet 2 tablet Take 2 tablets by mouth 2 times daily as needed for constipation    Linked Group 1:  \"Or\" Linked Group Details     polyethylene glycol (MIRALAX/GLYCOLAX) Packet 17 g Take 17 g by mouth daily as needed for constipation    bisacodyl (DULCOLAX) Suppository 10 mg Place 1 suppository (10 mg) rectally daily as needed for constipation    amLODIPine (NORVASC) tablet 10 mg Take 1 tablet (10 mg) by mouth daily    atorvastatin (LIPITOR) tablet 40 mg Starting on 1/27/2018. Take 2 tablets (40 mg) by mouth daily    levETIRAcetam (KEPPRA) tablet 1,000 mg Take 2 tablets " (1,000 mg) by mouth 2 times daily    Warfarin Therapy Reminder (Check START DATE - warfarin may be starting in the FUTURE) 1 each continuous prn    aspirin EC EC tablet 325 mg (Discontinued) Take 1 tablet (325 mg) by mouth daily                Review of Systems:   10 point review of systems negative except for what is stated in HPI.           Physical Exam:   There were no vitals taken for this visit.   Physical Exam:   General: NAD  HEENT: sclera anicteric  Resp: Breathing comfortably, no respiratory distress  CVS: RRR  Extremities: No edema, but cuts and ulcer in distal RLE.    Neurologic:   Mental Status Exam: Alert, awake and oriented to person, place and time. No dysarthria. Speech of normal fluency.  Name and repeats appropriately.  Follows multistep commands.     Cranial Nerves: PERRL, EOMs intact, no nystagmus, L homonymous hemianopia, Mild L facial asymmetry, facial sensation intact to light touch, hearing intact to conversation, palate and uvula rise symmetrically, no deviation in uvula or tongue, mild tongue tremor.     Motor: LUE increased tone with spasticity.  LLE increased tone with some spasticity.  R sided strength 5/5 diffusely.  LUE strength 3/5 throughout.   LLE 3/5 in proximal muscles 4+/5 distally.   Movements in LUE primarily proximal arrhythmic, not regular, with some entrainment with movements of R hand although never completely disappears.  If arm restrained when released movements worsened for short period of time.   LLE internally rotated.     Sensory: LUE/LLE impaired to light touch.  R side intact.    Coordination: Finger-nose-finger intact on R, L impaired by weakness. .    Reflexes: L upgoing toe.  R toe downgoing.    LUE reflexes 3+, 2+ on R.    3+ in LLE compared to 2+ RLE.     Gait: deferred          Data:   CBC:  Lab Results   Component Value Date    WBC 5.7 03/31/2017     Lab Results   Component Value Date    HGB 11.9 03/31/2017     Lab Results   Component Value Date    HCT 35.0  03/31/2017     Lab Results   Component Value Date     03/31/2017       Last Basic Metabolic Panel:  Lab Results   Component Value Date     03/31/2017      Lab Results   Component Value Date    POTASSIUM 3.4 03/31/2017     Lab Results   Component Value Date    CHLORIDE 108 03/31/2017     Lab Results   Component Value Date    RADHA 7.8 03/31/2017     Lab Results   Component Value Date    CO2 26 03/31/2017     Lab Results   Component Value Date    BUN 15 03/31/2017     Lab Results   Component Value Date    CR 0.56 03/31/2017     Lab Results   Component Value Date    GLC 95 03/31/2017       MRI:  Chronic R MCA Infract per outside read.[JR1.1]      Patient seen and examined by me on January 27, 2018  Agree with note above by Dr. Marks, dated January 26, 2017  She has point tenderness in L shoulder and pain with passively abducting arm suggestive of frozen shoulder. Will give non-sedating pain med to see if any improvement in tremor.   Alec Lipscomb MD[KV1.1]  January 27, 2018[KV1.2]       Revision History        User Key Date/Time User Provider Type Action    > KV1.2 1/27/2018  7:05 PM Alec Lipscomb MD Physician Sign     KV1.1 1/27/2018  7:03 PM Alec Lipscomb MD Physician      JR1.1 1/26/2018  8:29 PM Jaswinder Marks MD Resident Sign                     Discharge Summaries      Discharge Summaries by Kashmir Fowler MD at 1/27/2018  7:06 AM     Author:  Kashmir Fowler MD Service:  Neurology Author Type:  Resident    Filed:  1/28/2018 10:32 AM Date of Service:  1/27/2018  7:06 AM Creation Time:  1/27/2018  7:06 AM    Status:  Cosign Needed :  Kashmir Fowler MD (Resident)    Cosign Required:  Yes             [facility] Discharge Summary  Neurology Service    Marcia Kelley  MRN# [id number]    Age: 77 year old year old YOB: 1940      Date of Admission:  1/26/2018   Date of Discharge::  1/27/2018    Discharged to:[AB1.1] TCU[AC1.1]           Admission Diagnoses:[AB1.1]   LUE twitching  H/o R MCA stroke  H/o GTC   A Fib   HLD  HTN   Left shoulder pain[AB1.2]           Discharge Diagnosis:[AB1.1]   LUE twitching  H/o R MCA stroke  H/o GTC   A Fib   HLD  HTN   Left shoulder pain[AB1.2]           Procedures:[AB1.1]   EE/27:  EE, Preliminary read:  Video EEG day 1 is abnormal due to intermittent generalized theta slowing consistent with a mild encephalopathy.  The patient does have a low amplitude right arm tremor seen throughout the recording on the video.  There is no EEG correlate to suggest this is a seizure.  This tremor does not appear to be a simple motor seizure mainly because it is persistent, and if patient was in a simple motor status epilepticus, we would perhaps see something on her EEG.  Additionally, there is more pronounced intermittent focal slowing in the right temporal region suggestive of cortical dysfunction in this region.  This is consistent with patient's history of right MCA territory stroke.  No clinical seizures nor epileptiform discharges were seen.[AC1.1]         Imaging:[AB1.1]   No new imaging done during admission.  Old imaging reviewed through report on Care-Everywhere[AC1.1]          Discharge Medications:[AB1.1]     Current Discharge Medication List      START taking these medications    Details   warfarin (COUMADIN) 3 MG tablet Take 1 tablet (3 mg) by mouth daily  Qty: 30 tablet, Refills: 1    Associated Diagnoses: Cerebrovascular accident (CVA) due to embolism of right middle cerebral artery (H)      ibuprofen (ADVIL/MOTRIN) 200 MG tablet Take 1 tablet (200 mg) by mouth 3 times daily  Qty: 100 tablet    Associated Diagnoses: Shoulder injury, left, initial encounter      levETIRAcetam 1000 MG TABS Take 1,000 mg by mouth 2 times daily  Qty: 60 tablet    Associated Diagnoses: Seizures (H)      atorvastatin (LIPITOR) 40 MG tablet Take 1 tablet (40 mg) by mouth daily  Qty: 30 tablet    Associated  Diagnoses: Cerebrovascular accident (CVA) due to embolism of right middle cerebral artery (H)         CONTINUE these medications which have NOT CHANGED    Details   SERTRALINE HCL PO Take 50 mg by mouth At Bedtime      co-enzyme Q-10 100 MG CAPS capsule Take 1 capsule (100 mg) by mouth daily  Refills: 0    Associated Diagnoses: Acute ischemic stroke (H)      aspirin  MG EC tablet Take 1 tablet (325 mg) by mouth daily  Qty: 40 tablet    Associated Diagnoses: Acute ischemic stroke (H)      cloNIDine (CATAPRES) 0.1 MG tablet Take 1 tablet (0.1 mg) by mouth every 4 hours as needed (SBP > 160)  Qty: 30 tablet    Associated Diagnoses: Acute ischemic stroke (H)      amLODIPine (NORVASC) 10 MG tablet Take 1 tablet (10 mg) by mouth daily  Qty: 30 tablet    Associated Diagnoses: Acute ischemic stroke (H)      senna-docusate (SENOKOT-S;PERICOLACE) 8.6-50 MG per tablet Take 1-2 tablets by mouth 2 times daily as needed (constipation )  Qty: 100 tablet    Associated Diagnoses: Acute ischemic stroke (H)      potassium phosphate, monobasic, (K-PHOS) 500 MG tablet Take 1 tablet (500 mg) by mouth daily    Associated Diagnoses: Acute ischemic stroke (H)      cholecalciferol 2000 UNITS tablet Take 2,000 Units by mouth daily  Qty: 30 tablet    Associated Diagnoses: Acute ischemic stroke (H)[AC1.2]                  Consultations:     No consultations were requested during this admission          Brief History of Illness:[AB1.1]     Marcia Kelley is a 77 year old female with history of R MCA stroke in 3/2017 felt to due to afib as well as seizure in 10/2017 who presents with uncontrollable LUE movements since 1/20/2017.   She states she overexerted herself that day, but then had sudden onset of twitching in LUE that she felt was seizure activity.  She went to OSH who did EEG that captured spell but there was no EEG correlate.  MRI brain was unchanged and MRI  C-spine showed severe bilateral neural foraminal stenosis.   There was  concern that this may be conversion disorder but elected to transfer to [facility] for further work up to pursue alternative etiologies.  Since hospitalization she states movements have not improved despite additional trial of Zonegran at OSH.    She feels more fatigued and says LUE is more weak.   Movements do apparently disappear during sleep.   She feels generally weak and tired and states she has not found anything to make movements disappear or improve.[AB1.2]    See H&P for additional details.[AB1.1]  Further chart review done on 1/28/2018: Seizure disorder summarized in 9/11/2017 Office note by Dr. Jamaal Guadarrama.  9/2/2017 admission for possible complex partial seizure and had EEG monitoring w/out seizure activity reported with movement. Discharged on Phenytoin and then switched to Keppra at some point in October 2017 (reasoning unknown upon initial review).  9/2/2017 admission was due to fall where she hit her head with suspicion that a seizure caused the fall (unknown if witnessed).  No seizure history during March of 2017 due to R-posterior MCA infarction (given tPA for R-M1) and some hemorrhagic transformation ultimately leaving her with left sided weakness, severe cognitive/linguistic impairments, inattention/destractibility, Left visual inattention, and reduced executive dysfunction.[AC1.3]          Hospital Course (by problem list):[AB1.1]     # LUE twitching  Irregular, non ryhthmic LUE movements without EEG correlate at our institution, as well as at OSH prior to transfer, unlikely to be seizure. EEG did reveal focal right temporal slowing consistent with her previous infarct. Movements are reportedly not suppressible but disappear in sleep and when are absent when patient is unaware of being observed. Movements are not myoclonic in nature and MRI C spine in care everywhere did not reveal any lesion that could cause segmental myoclonus. Perhaps these movements are in response to left shoulder pain, which  she is likely neglecting as she has some left hemisensory neglect on exam. She has increased tone of the LUE and restricted ROM In the left shoulder. Tylenol and ibuprofen were scheduled in alternating doses to see if reduced pain reduces the movements. She has been doing PT for this shoulder but would benefit from aggressive PT to reduce risk of frozen shoulder.[AB1.2]   - Continuing Keppra 1000 mg BID upon discharge, but we would recommend this medication be reconsidered in 2-4 weeks as it may not be needed for seizure prophylaxis considering there has been no convincing evidence that the patient is having epileptic events.    #Hx R MCA stroke:  Afib found on holter monitor per report  - Continue coumadin at home dose      #HTN: continue amlodipine at home dose      #HLD: Continue lipitor at home dose    #Depression: continue Zoloft at last dose.  It does seem that at one point the patient was on 100 mg QD but we cannot confirm this dose    - Zoloft 50 mg QD[AC1.3]       ITEMS TO FOLLOW-UP ON AFTER DISCHARGE[AB1.1]   - Follow up with PCP to discuss ongoing Antidepressant therapy  - Follow up with PT as outpatient to determine treatment plan for possible conversion and limit risk for developing frozen shoulder[AC1.1]         Physical Exam:[AB1.1]     /56 (BP Location: Right arm)  Pulse (!) 45  Temp 96.6  F (35.9  C) (Oral)  Resp 16  SpO2 95%[AC1.4]   General:  NAD  Head:  Normocephalic, atraumatic  Eyes:  No conjunctival injection, no scleral icterus.   Mouth:  No oral lesions, no erythema or exudate in the oropharynx  Respiratory:  No respiratory distress  Cardiovascular:  RRR  Abdomen:  Soft  Extremities:  No peripheral edema[AB1.1]     General: NAD, cooperative  HEENT: NC/AT  Cardiac: RRR no M  Chest: CTAB no w/c/r  Abdomen: S/NT/ND  Extremities: No LE swelling  Skin: No rash or lesion  Neuro:  Mental status: Awake, alert, attentive, oriented to time, place, situation, and follows multiple step  commands  Cranial nerves: Eyes conjugate, PERRLA, EOMI, VFF, left lower facial droop, tongue/uvula midline.   Motor: Increased tone in LUE. 5/5 strength in RUE, LUE 2/5 deltoid, 3/5 bicep and tricep, weak  strength. LEs   Reflexes: 2/4 on L, 2/4 on R UE b/l and LE b/l.  Sensory: Vague sensory deficits throughout LLE, RLE that still indicate intact sensation just a dulling of the modality.   Coordination: FNF smooth w/out dysmetria or ataxia b/l.  Gait: Stands only with assistance.[AC1.1]    DISCHARGE ORDERS[AB1.1]  General info for SNF   Length of Stay Estimate: Short Term Care: Estimated # of Days 31-90  Condition at Discharge: Stable  Level of care:skilled   Rehabilitation Potential: Fair  Admission H&P remains valid and up-to-date: Yes  Recent Chemotherapy: N/A  Use Nursing Home Standing Orders: Yes     Mantoux instructions   Give two-step Mantoux (PPD) Per Facility Policy Yes     Reason for your hospital stay   To evaluate your left upper extremity twitching and shoulder pain. There were no seizures on EEG but your shoulder pain is thought to causing irregular movements.     General info for SNF   Length of Stay Estimate: Short Term Care: Estimated # of Days <30  Condition at Discharge: Stable  Level of care:skilled   Rehabilitation Potential: Good  Admission H&P remains valid and up-to-date: Yes  Recent Chemotherapy: N/A  Use Nursing Home Standing Orders: Yes     Mantoux instructions   Give two-step Mantoux (PPD) Per Facility Policy Yes     Reason for your hospital stay   You were transferred to the [facility] for further investigation into your atypical arm and leg movements.  There was initial concern that you may be having myoclonic seizures.  After EEG was done, it was determined that your movements were not associated with increased electrical activity of the brain causing a complex partial, or focal Seizure.  You likely have had ongoing deficits from your prior stroke in 03/2017 that left you with  considerable difficulties in cognitive function, attention, and ability to recognize things (your own body included) on your left side.  These deficits likely have lead to your atypical movements and should improve over time and with specific post-stroke therapies as well as mix of conversion disorder therapies.  You were discharged to a TCU for further rehab and cares.  Continuation of your Keppra may not be required, but ultimately we would rather have your family or current neurologist make this discontinuation recommendation.     Activity - Up with nursing assistance     Follow Up (Transitional Care Management)   Follow up with primary care provider, within 7 days to evaluate medication change.  No follow up labs or test are needed.      Appointments on [facility] and/or [facility] (with [facility] or [facility] provider or service). Call [phone] if you haven't heard regarding these appointments within 7 days of discharge.     Follow Up ([facility])   Follow up with primary care provider, within 7 days to evaluate medication change and for hospital follow- up.  No follow up labs or test are needed.  Please follow up with antidepressant medication recommendations as well as consultation for neurology if needed.     Appointments on [facility] and/or [facility] (with [facility] or [facility] provider or service). Call [phone] if you haven't heard regarding these appointments within 7 days of discharge.     Full Code     Full Code     Physical Therapy Adult Consult   Evaluate and treat as clinically indicated.    Reason: Post stroke left weakness especially L shoulder and LUE     Occupational Therapy Adult Consult   Evaluate and treat as clinically indicated.  Reason:  Post stroke          Fall precautions     Advance Diet as Tolerated   Follow this diet upon discharge: Orders Placed This Encounter     Advance Diet as Tolerated: Regular Diet Adult     Advance Diet as Tolerated   Follow this diet upon discharge: Orders  Placed This Encounter     Advance Diet as Tolerated: Regular Diet Adult     Advance Diet as Tolerated[AC1.3]       Patient discussed with Neurology Staff [AB1.1] Deisi.[AB1.2]     GENIA Fowler  PGY4[AC1.1] Neurology[AB1.2]          Revision History        User Key Date/Time User Provider Type Action    > AC1.2 1/28/2018 10:32 AM Kashmir Fowler MD Resident Sign     AC1.3 1/28/2018 10:27 AM Kashmir Fowler MD Resident      AC1.4 1/28/2018 10:15 AM Kashmir Fowler MD Resident Share     AC1.1 1/28/2018 10:11 AM Kashmir Fowler MD Resident      [N/A] 1/27/2018  6:36 PM Nuria Jones,  Resident Share     AB1.2 1/27/2018  6:32 PM Nuria Jones,  Resident Share     AB1.1 1/27/2018  7:06 AM Nuria Jones,  Resident                   Consult Notes     No notes of this type exist for this encounter.         Progress Notes - Physician (Notes from 01/25/18 through 01/28/18)      Progress Notes by Herber Silva RN at 1/28/2018  4:00 AM     Author:  Herber Silva RN Service:  Nursing Author Type:  Registered Nurse    Filed:  1/28/2018  4:16 AM Date of Service:  1/28/2018  4:00 AM Creation Time:  1/28/2018  4:16 AM    Status:  Signed :  Herber Silva RN (Registered Nurse)         Problem: Patient Care Overview  Goal: Plan of Care/Patient Progress Review  Outcome: No Change  Outpatient/Observation goals to be met before discharge home:      1. Diagnostic tests completed - YES  2. Vitals normalized - YES  3. Therapy assesses need for rehab - NO, PT/OT has not yet seen the pt.    [CG1.1]          Revision History        User Key Date/Time User Provider Type Action    > CG1.1 1/28/2018  4:16 AM Herber Silva RN Registered Nurse Sign            Progress Notes by Herber Silva RN at 1/28/2018 12:00 AM     Author:  Herber Silva RN Service:  Nursing Author Type:  Registered Nurse     Filed:  2018 12:03 AM Date of Service:  2018 12:00 AM Creation Time:  2018 12:02 AM    Status:  Signed :  Herber Silva RN (Registered Nurse)         Problem: Patient Care Overview  Goal: Plan of Care/Patient Progress Review  Outcome: No Change  Outpatient/Observation goals to be met before discharge home:      1. Diagnostic tests completed - YES  2. Vitals normalized - YES  3. Therapy assesses need for rehab - NO, PT/OT has not yet seen the pt.[CG1.1]     Revision History        User Key Date/Time User Provider Type Action    > CG1.1 2018 12:03 AM Herber Silva RN Registered Nurse Sign            Progress Notes by Herber Silva RN at 2018  8:00 PM     Author:  Herber Silva RN Service:  Nursing Author Type:  Registered Nurse    Filed:  2018  8:56 PM Date of Service:  2018  8:00 PM Creation Time:  2018  8:55 PM    Status:  Signed :  Herber Silva RN (Registered Nurse)         Problem: Patient Care Overview  Goal: Plan of Care/Patient Progress Review  Outcome: No Change  Outpatient/Observation goals to be met before discharge home:     1. Diagnostic tests completed - YES  2. Vitals normalized - YES  3. Therapy assesses need for rehab - NO, PT/OT has not yet seen the pt.[CG1.1]      Revision History        User Key Date/Time User Provider Type Action    > CG1.1 2018  8:56 PM Herber Silva RN Registered Nurse Sign            Progress Notes by Alec Lipscomb MD at 2018  7:05 AM     Author:  Alec Lipscomb MD Service:  Neurology Author Type:  Physician    Filed:  2018  7:09 PM Date of Service:  2018  7:05 AM Creation Time:  2018  7:05 AM    Status:  Signed :  Alec Lipscomb MD (Physician)         [facility] Neurology Progress Note    Patient Name:  Marcia Kelley  MRN:  [id number]    :  1940  Date of Service:  2018    Interval  history:[AB1.1] EEG running overnight without seizures or epileptic activity. Patient reports being unable to suppress movement though when unaware of examination, there are no abnormal movements of the left arm. She reports she slept well.[AB1.2]     ROS  A 10-point ROS was performed as per HPI.     Physical Examination   Vitals: /55 (BP Location: Right arm)  Pulse 54  Temp 98.5  F (36.9  C) (Oral)  Resp 16  SpO2 95%  General: Adult, in NAD, cooperative  HEENT: NC/AT, no icterus, op pink and moist  Cardiac: RRR no M  Chest: CTAB no w/c/r  Abdomen: S/NT/ND  Extremities: No LE swelling.  Skin: No rash or lesion.   Psych:[AB1.1] Flat affect[AB1.2]    Neuro:  Mental status: Awake, alert, attentive, oriented[AB1.1] to self, date, place, time[AB1.2].   Cranial nerves: Eyes conjugate, PERRLA, EOMI,[AB1.1] VFF, left lower facial droop[AB1.2],[AB1.1] t[AB1.2]ongue/uvula midline.   Motor:[AB1.1] Increased tone in LUE.[AB1.2] 5/5 strength[AB1.1] in RUE, LUE 2/5 deltoid, 3/5 bicep and tricep, weak  strength. LEs[AB1.2]   Reflexes:[AB1.1] 3/4 on L, 2/4 on R.[AB1.2]  Sensory:[AB1.1] Right sensation intact light touch and pain. LLE not intact to pinch, LUE intact to pinch but not light touch. LLE sensory neglect.[AB1.2]  Coordination: FNF no dysmetria[AB1.1] on R[AB1.2]  Gait:[AB1.1] Deferred due to weakness[AB1.2]    Investigations[AB1.1]   CBC, BMP WNL  INR 2.18    VEEG: no epileptic activity thus far[AB1.2]    Assessment and Plan:[AB1.1]  Marcia Kelley is a 77 year old female with history of R MCA stroke and one GTC in past who presents with uncontrollable LUE movements.       Plan:  #LUE twitching  Irregular, non ryhthmic LUE movements without EEG correlate at our institution thus far, as well as at OSH prior to transfer, unlikely to be seizure. Movements are reportedly not suppressible but disappear in sleep and when are absent when patient is unaware of being observed. Movements are not myoclonic[AB1.2] in  naturel and[AB1.3] MRI C spine in care everywhere did not reveal any lesion that could cause segmental myoclonus.[AB1.2] Perhaps these movements are secondary to shoulder pain as she has increased tone and restricted ROM In that shoulder.[AB1.3]   -[AB1.2] Will d/c EEG after final report from Dr. Coulter[AB1.3]  - Neurochecks  - Seizure precautions[AB1.2]    # Shoulder pain: Scheduled tylenol QID[AB1.4] alternating with NSAIDs TID.[AB1.5]     #Hx R MCA stroke:  Afib found on holter monitor per report  - Continue coumadin, pharmacy to dose  - INR     #HTN: continue amlodipine     #HLD: Continue lipitor      Code: Full  FEN:  Regular diet  Px:  SCD's and warfarin  Dispo: 1-2 days pending eEG monitoring.[AB1.2]        Disposition Plan   Expected discharge in[AB1.1] 0-1[AB1.2] days to[AB1.1] prior living arrangement vs TCU[AB1.2] once[AB1.1] bed available if needed[AB1.2].     Entered: Nuria Jones 01/27/2018, 7:05 AM     Patient was seen and discussed with [AB1.1] Deisi.[AB1.2]     Nuria Jones DO  Neurology PGY2  P: [phone][AB1.1]    Patient seen and examined by me on January 27, 2018  Agree with note above by Dr. Jones dated January 27, 2018  Trial of pain meds to see if tremor improves  Alec Lipscomb MD[KV1.1]  January 27, 2018[KV1.2]       Revision History        User Key Date/Time User Provider Type Action    > KV1.2 1/27/2018  7:09 PM Alec Lipscomb MD Physician Sign     KV1.1 1/27/2018  7:08 PM Alec Lipscomb MD Physician      AB1.5 1/27/2018 10:06 AM Nuria Jones DO Resident Sign     AB1.4 1/27/2018  9:39 AM Nuria Jones DO Resident Sign     AB1.3 1/27/2018  9:32 AM Nuria Jones,  Resident Sign     AB1.2 1/27/2018  7:57 AM Nuria Jones,  Resident      AB1.1 1/27/2018  7:05 AM Nuria Jones,  Resident             Progress Notes by Olena Greenberg LICSW at 1/27/2018  3:52 PM      Author:  Olena Greenberg LICSW Service:  Social Work Author Type:      Filed:  2018  4:11 PM Date of Service:  2018  3:52 PM Creation Time:  2018  3:52 PM    Status:  Signed :  Olena Greenberg LICSW ()         Social Work: Assessment with Discharge Plan    Patient Name:  Marcia Kelley  :  1940  Age:  77 year old  MRN:  [id number]  Risk/Complexity Score:     Completed assessment with:  Marcia and spouse Oj    Presenting Information   Reason for Referral:  Discharge plan  Date of Intake:  2018  Referral Source:  Physician  Decision Maker:  Magdalena  Alternate Decision Maker:  Oj  Health Care Directive:  Declined completing- she filled one out two times but never signed it.   Living Situation:  House  Previous Functional Status:  Not fully assessed but she had stroke last summer, was in TCU [facility] and has been home for a few months getting outpt PT at [facility]  Patient and family understanding of hospitalization:  To determine cause of tremors. She was admitted to [facility] 2018 and DC'd to [facility] 18. They are aware she is now in observation status  Cultural/Language/Spiritual Considerations:  Anglican. Was a lay   Adjustment to Illness:  Not addressed    Physical Health  Reason for Admission:[MW1.1]  No diagnosis found.[MW1.2]  Services Needed/Recommended:  TCU    Mental Health/Chemical Dependency  Diagnosis:  Not identified via medical record  Support/Services in Place:    Services Needed/Recommended:      Support System  Significant relationship at present time:  Spouse Oj  Family of origin is available for support:  6 children, 14 grand children 2 freat grand children  Other support available:    Gaps in support system:  None identified  Patient is caregiver to:  None     Provider Information   Primary Care Physician:  No primary care provider on file.   None   Clinic:  No primary  physician on file.      :      Financial   Income Source:  FCI  Financial Concerns:  None identified  Insurance:    Payor/Plan Subscriber Name Rel Member # Group #   MEDICARE - MEDICARE F* FRAN YAO  [id number]       ATTN CLAIMS, [address]   [employer] - [facility]* FRAN YAO  [id number]      [address]       Discharge Plan   Patient and family discharge goal:  To go to a TCU and then return home when stronger  Provided education on discharge plan:  YES  Patient agreeable to discharge plan:  YES  A list of Medicare Certified Facilities was provided to the patient and/or family to encourage patient choice. Patient's choices for facility are:  [facility], [facility], [facility]  Will NH provide Skilled rehabilitation or complex medical:  YES  General information regarding anticipated insurance coverage and possible out of pocket cost was discussed. Patient and patient's family are aware patient may incur the cost of transportation to the facility, pending insurance payment: YES  Barriers to discharge:  Openning    Discharge Recommendations   Anticipated Disposition:  Facility:  TBD  Transportation Needs:  Spouse if able  Name of Transportation Company and Phone:  TBD    Additional comments   Referrals made to [facility] where Pt very much wants to go. They anticipate no openings until Tuesday.Referral to [facility] but no openings this weekend. [facility] is assessing and may have an opening. Contact [phone] for update on Sunday.  Updated spouse and he sent a message to me to also try [facility].  -Pt would like a pvt room if possible.     SW to f/u tomorrow.    SILKE Harris   [phone][MW1.1]      Revision History        User Key Date/Time User Provider Type Action    > MW1.2 1/27/2018  4:11 PM Olena Greenberg LICSW  Sign     MW1.1 1/27/2018  3:52 PM Olena Greenberg LICSW               Progress Notes by Nenita Rocha at 1/27/2018  7:00 AM     Author:  Nenita Rocha Service:  (none) Author Type:  Technician    Filed:  1/27/2018  2:05 PM Encounter Date:  1/27/2018 Status:  Signed    :  JosefinaNenita (Technician)           CPT: 10407   [place]/IP/VEEG  Dr. Coulter[EM1.1]     Revision History        User Key Date/Time User Provider Type Action    > EM1.1 1/27/2018  2:05 PM Nenita Rocha Technician Sign            Progress Notes by Sugar Coulter MD at 1/27/2018 12:45 PM     Author:  Sugar Coulter MD Service:  Neurology Author Type:  Physician    Filed:  1/27/2018 12:50 PM Date of Service:  1/27/2018 12:45 PM Creation Time:  1/27/2018 12:45 PM    Status:  Signed :  Sugar Coulter MD (Physician)         Preliminary Video EEG impression on January 26-27, 2018  Until 6am  Full report to follow. Please look in inpatient chart, under procedure section.     This video EEG is abnormal due to right temporal slowing, this is consistent with her history of right MCA stroke. There is also some background slowing consistent with a mild encephalopathy. Patient has upper extremities tremor which has no EEG correlate. She pressed the event button persistent e will continue to record for target spells.  No epileptiform discharges or electrographic seizures were seen in this record. If events of interest are noted, please press the event button and complete behavioral testing (ROAR testing) if possible. Clinical correlation is advised.     Sugar Coulter MD  Staff Epilepsy Neurologist   [phone][SP1.1]       Revision History        User Key Date/Time User Provider Type Action    > SP1.1 1/27/2018 12:50 PM Sugar Coulter MD Physician Sign            Progress Notes by Mohsen Martinez at 1/27/2018 11:36 AM     Author:  Mohsen Martinez Service:  (none) Author Type:      Filed:  1/27/2018 11:53 AM Date of Service:  1/27/2018 11:36 AM Creation Time:  1/27/2018 11:36 AM    Status:  Signed :   "Mohsen Martinez ()         SPIRITUAL HEALTH SERVICES  SPIRITUAL ASSESSMENT Progress Note  [facility]B     PRIMARY FOCUS:     Goals of care    Support for coping    REFERRAL SOURCE: Request for Hospital  at admission.    ILLNESS CIRCUMSTANCES:   Reviewed documentation. Reflective conversation shared with Marcia and Ramana () which integrated elements of illness and family narratives.     Context of Serious Illness/Symptom(s) - Marcia introduced herself with the statement and a smile, \"Look at all these wires. Isn't it ducky?\" She and Ramana described a series of health crises, in March, September and her current illness. \"I lost this year.\"  Resources for Support - Marcia named ramana, and her melisa community, as resources for support \"We have people praying for us all around the world... And I can feel their prayers.\" They showed me a card that read, \"Prayer, the ultimate wireless connection.\"  DISTRESS:     Emotional/Spiritual/Existential Distress - Marcia characterized the last year as \"lost,\" and named disconnection with gardening and walking sources of suffering. She had been working hard to regain function after her stroke, and characterized her recent illness as \"this has set me back to the start.\"    SPIRITUAL/Adventism COPING:     Congregational/Melisa - Marcia and her  are faithful in the Latter day melisa.    Spiritual Practice(s) - Mass, Communion and prayer they named as important to them.    Emotional/Relational/Existential Connections - Their melisa community and family are important to them. Marcia and Ramana said that they have 14 grandchildren, and two great grand children on the way. Despite Marcia's health related events, they maintained their holiday gatherings by renting a facility near their house.    GOALS OF CARE:    Goals of Care - To resume her work of recovery of strength and function.    Meaning/Sense-Making - \"It has been a lost year... But I am able to do these " "things through the prayers of the many people who are praying.\"    PLAN: I will follow up once per week for prayer support as Marcia remains on 7B.    Antonio Juan   Intern  Pager [phone][NM1.1]       Revision History        User Key Date/Time User Provider Type Action    > NM1.1 1/27/2018 11:53 AM Mohsen Martinez Sign            Progress Notes by Jelena Castillo at 1/26/2018  8:00 PM     Author:  Jelena Castillo Service:  (none) Author Type:  Technician    Filed:  1/26/2018 11:29 PM Encounter Date:  1/26/2018 Status:  Signed    :  Jelena Castillo (Technician)           CPT:19667.mod52  University/IP/veeg  Reading: MD Yfn[VA1.1]     Revision History        User Key Date/Time User Provider Type Action    > VA1.1 1/26/2018 11:29 PM Jelena Castillo Technician Sign            Progress Notes by Sugar Coulter MD at 1/26/2018 10:38 PM     Author:  Sugar Coulter MD Service:  Neurology Author Type:  Physician    Filed:  1/26/2018 10:41 PM Date of Service:  1/26/2018 10:38 PM Creation Time:  1/26/2018 10:38 PM    Status:  Signed :  Sugar Coulter MD (Physician)         Preliminary Video EEG impression on January 26, 2018  for the first 20 minutes.  Full report to follow. Please look in inpatient chart, under procedure section.     This video EEG is abnormal due to right temporal slowing, this is consistent with her history of right MCA stroke. There is also some background slowing consistent with a encephalopathy. We will continue to record for target spells.  No epileptiform discharges or electrographic seizures were seen in this record. If events of interest are noted, please press the event button and complete behavioral testing (ROAR testing) if possible. Clinical correlation is advised.     Sugar Coulter MD  Staff Epilepsy Neurologist   [phone][SP1.1]       Revision History        User Key Date/Time User Provider Type Action    > SP1.1 " 1/26/2018 10:41 PM Sugar Coulter MD Physician Sign                  Procedure Notes     No notes of this type exist for this encounter.      Progress Notes - Therapies (Notes from 01/25/18 through 01/28/18)     No notes of this type exist for this encounter.

## 2018-01-26 NOTE — IP AVS SNAPSHOT
UNIT 7B [facility]: [phone]                                              INTERAGENCY TRANSFER FORM - PHYSICIAN ORDERS   2018                    Hospital Admission Date: 2018  FRAN YAO   : 1940  Sex: Female        Attending Provider: Alec Lipscomb MD     Allergies:  No Known Allergies    Infection:  None   Service:  NEUROLOGY    Ht:  --   Wt:  --   Admission Wt:  --    BMI:  --   BSA:  --            Patient PCP Information     None on File      ED Clinical Impression     Diagnosis Description Comment Added By Time Added    Shoulder injury, left, initial encounter [S49.92XA] Shoulder injury, left, initial encounter [S49.92XA]  Nuria Jones,  2018  6:33 PM    Seizures (H) [R56.9] Seizures (H) [R56.9]  Nuria Jones,  2018  6:33 PM    Cerebrovascular accident (CVA) due to embolism of right middle cerebral artery (H) [I63.411] Cerebrovascular accident (CVA) due to embolism of right middle cerebral artery (H) [I63.411]  Nuria Jones,  2018  6:33 PM      Hospital Problems as of 2018              Priority Class Noted POA    Tremor Medium  2018 Yes      Non-Hospital Problems as of 2018              Priority Class Noted    CVA (cerebral vascular accident) (H) Medium  3/21/2017    Abnormal movement Medium  2018      Code Status History     Date Active Date Inactive Code Status Order ID Comments User Context    2018 10:24 AM  Full Code [id number]  Kashmir Fowler MD Outpatient    2018  6:36 PM 2018 10:24 AM Full Code [id number]  Nuria Jones GhazalpatricioDO Outpatient    2018  6:54 PM 2018  6:36 PM Full Code [id number]  Bam Fermin MD Inpatient    3/31/2017  8:43 AM 2018  6:54 PM Full Code [id number]  Dipak Lucero DO Outpatient    3/21/2017  5:57 PM 3/31/2017  8:43 AM Full Code [id number]  Clifton Moore MD  Inpatient         Medication Review      START taking        Dose / Directions Comments    atorvastatin 40 MG tablet   Commonly known as:  LIPITOR   Used for:  Cerebrovascular accident (CVA) due to embolism of right middle cerebral artery (H)        Dose:  40 mg   Take 1 tablet (40 mg) by mouth daily   Quantity:  30 tablet   Refills:  0        ibuprofen 200 MG tablet   Commonly known as:  ADVIL/MOTRIN   Used for:  Shoulder injury, left, initial encounter        Dose:  200 mg   Take 1 tablet (200 mg) by mouth 3 times daily   Quantity:  100 tablet   Refills:  0        levETIRAcetam 1000 MG Tabs   Used for:  Seizures (H)        Dose:  1000 mg   Take 1,000 mg by mouth 2 times daily   Quantity:  60 tablet   Refills:  0        warfarin 3 MG tablet   Commonly known as:  COUMADIN   Used for:  Cerebrovascular accident (CVA) due to embolism of right middle cerebral artery (H)        Dose:  3 mg   Take 1 tablet (3 mg) by mouth daily   Quantity:  30 tablet   Refills:  1          CONTINUE these medications which have NOT CHANGED        Dose / Directions Comments    amLODIPine 10 MG tablet   Commonly known as:  NORVASC   Used for:  Acute ischemic stroke (H)        Dose:  10 mg   Take 1 tablet (10 mg) by mouth daily   Quantity:  30 tablet   Refills:  0        aspirin 325 MG EC tablet   Used for:  Acute ischemic stroke (H)        Dose:  325 mg   Take 1 tablet (325 mg) by mouth daily   Quantity:  40 tablet   Refills:  0        cholecalciferol 2000 UNITS tablet   Used for:  Acute ischemic stroke (H)        Dose:  2000 Units   Take 2,000 Units by mouth daily   Quantity:  30 tablet   Refills:  0        cloNIDine 0.1 MG tablet   Commonly known as:  CATAPRES   Used for:  Acute ischemic stroke (H)        Dose:  0.1 mg   Take 1 tablet (0.1 mg) by mouth every 4 hours as needed (SBP > 160)   Quantity:  30 tablet   Refills:  0        co-enzyme Q-10 100 MG Caps capsule   Used for:  Acute ischemic stroke (H)        Dose:  100 mg   Take 1  capsule (100 mg) by mouth daily   Refills:  0        potassium phosphate (monobasic) 500 MG tablet   Commonly known as:  K-PHOS   Used for:  Acute ischemic stroke (H)        Dose:  500 mg   Take 1 tablet (500 mg) by mouth daily   Refills:  0        senna-docusate 8.6-50 MG per tablet   Commonly known as:  SENOKOT-S;PERICOLACE   Used for:  Acute ischemic stroke (H)        Dose:  1-2 tablet   Take 1-2 tablets by mouth 2 times daily as needed (constipation )   Quantity:  100 tablet   Refills:  0        SERTRALINE HCL PO        Dose:  50 mg   Take 50 mg by mouth At Bedtime   Refills:  0                Summary of Visit     Reason for your hospital stay       To evaluate your left upper extremity twitching and shoulder pain. There were no seizures on EEG but your shoulder pain is thought to causing irregular movements.       Reason for your hospital stay       You were transferred to the [facility] for further investigation into your atypical arm and leg movements.  There was initial concern that you may be having myoclonic seizures.  After EEG was done, it was determined that your movements were not associated with increased electrical activity of the brain causing a complex partial, or focal Seizure.  You likely have had ongoing deficits from your prior stroke in 03/2017 that left you with considerable difficulties in cognitive function, attention, and ability to recognize things (your own body included) on your left side.  These deficits likely have lead to your atypical movements and should improve over time and with specific post-stroke therapies as well as mix of conversion disorder therapies.  You were discharged to a TCU for further rehab and cares.             After Care     Activity - Up with nursing assistance           Advance Diet as Tolerated       Follow this diet upon discharge: Orders Placed This Encounter      Advance Diet as Tolerated: Regular Diet Adult       Advance Diet as Tolerated       Follow this diet  upon discharge: Orders Placed This Encounter      Advance Diet as Tolerated: Regular Diet Adult      Advance Diet as Tolerated       Fall precautions           Fall precautions           Follow Up (Transitional Care Management)       Follow up with primary care provider, within 7 days to evaluate medication change.  No follow up labs or test are needed.      Appointments on [place] and/or [facility] (with [facility] or [facility] provider or service). Call [phone] if you haven't heard regarding these appointments within 7 days of discharge.       General info for SNF       Length of Stay Estimate: Short Term Care: Estimated # of Days 31-90  Condition at Discharge: Stable  Level of care:skilled   Rehabilitation Potential: Fair  Admission H&P remains valid and up-to-date: Yes  Recent Chemotherapy: N/A  Use Nursing Home Standing Orders: Yes       General info for SNF       Length of Stay Estimate: Short Term Care: Estimated # of Days <30  Condition at Discharge: Stable  Level of care:skilled   Rehabilitation Potential: Good  Admission H&P remains valid and up-to-date: Yes  Recent Chemotherapy: N/A  Use Nursing Home Standing Orders: Yes       Mantoux instructions       Give two-step Mantoux (PPD) Per Facility Policy Yes       Mantoux instructions       Give two-step Mantoux (PPD) Per Facility Policy Yes             Referrals     Occupational Therapy Adult Consult       Evaluate and treat as clinically indicated.    Reason:  Post stroke       Physical Therapy Adult Consult       Evaluate and treat as clinically indicated.    Reason: Post stroke left weakness especially L shoulder and LUE             Follow-Up Appointment Instructions     Future Labs/Procedures    Follow Up ([facility])     Comments:    Follow up with primary care provider, within 7 days to evaluate medication change and for hospital follow- up.  No follow up labs or test are needed.      Appointments on [place] and/or [facility] (with [facility] or [facility]  provider or service). Call [phone] if you haven't heard regarding these appointments within 7 days of discharge.      Follow-Up Appointment Instructions     Follow Up ([facility])       Follow up with primary care provider, within 7 days to evaluate medication change and for hospital follow- up.  No follow up labs or test are needed.      Appointments on [place] and/or [facility] (with [facility] or [facility] provider or service). Call [phone] if you haven't heard regarding these appointments within 7 days of discharge.             Statement of Approval     Ordered          01/28/18 1027  I have reviewed and agree with all the recommendations and orders detailed in this document.  EFFECTIVE NOW     Approved and electronically signed by:  Kashmir Fowler MD

## 2018-01-26 NOTE — IP AVS SNAPSHOT
UNIT 7B [facility] BANK: [phone]                                              INTERAGENCY TRANSFER FORM - LAB / IMAGING / EKG / EMG RESULTS   2018                    Hospital Admission Date: 2018  FRAN YAO   : 1940  Sex: Female        Attending Provider: Alec Lipscomb MD     Allergies:  No Known Allergies    Infection:  None   Service:  NEUROLOGY    Ht:  --   Wt:  --   Admission Wt:  --    BMI:  --   BSA:  --            Patient PCP Information     None on File         Lab Results - 3 Days      INR [[id number]] (Abnormal)  Resulted: 18 0749, Result status: Final result    Ordering provider: Jaswinder Marks MD  18 010 Resulting lab: [facility]    Specimen Information    Type Source Collected On   Blood  18 0728          Components       Value Reference Range Flag Lab   INR 3.00 0.86 - 1.14 H 51            INR [[id number]] (Abnormal)  Resulted: 18 0756, Result status: Final result    Ordering provider: Jaswinder Marks MD  18 010 Resulting lab: [facility]    Specimen Information    Type Source Collected On   Blood  18 0733          Components       Value Reference Range Flag Lab   INR 2.81 0.86 - 1.14 H 51            INR [[id number]] (Abnormal)  Resulted: 18, Result status: Final result    Ordering provider: Ana Paulson RPH  18 Resulting lab: [facility]    Specimen Information    Type Source Collected On   Blood  18          Components       Value Reference Range Flag Lab   INR 2.90 0.86 - 1.14 H 51            Comprehensive metabolic panel [[id number]] (Abnormal)  Resulted: 18, Result status: Final result    Ordering provider: Bam Fermin MD  18 185 Resulting lab: [facility]    Specimen Information    Type Source Collected On    Blood  01/26/18 2033          Components       Value Reference Range Flag Lab   Sodium 143 133 - 144 mmol/L  51   Potassium 4.0 3.4 - 5.3 mmol/L  51   Chloride 112 94 - 109 mmol/L H 51   Carbon Dioxide 22 20 - 32 mmol/L  51   Anion Gap 9 3 - 14 mmol/L  51   Glucose 138 70 - 99 mg/dL H 51   Urea Nitrogen 31 7 - 30 mg/dL H 51   Creatinine 0.80 0.52 - 1.04 mg/dL  51   GFR Estimate 69 >60 mL/min/1.7m2  51   Comment:  Non  GFR Calc   GFR Estimate If Black 84 >60 mL/min/1.7m2  51   Comment:  African American GFR Calc   Calcium 8.4 8.5 - 10.1 mg/dL L 51   Bilirubin Total 0.6 0.2 - 1.3 mg/dL  51   Albumin 2.8 3.4 - 5.0 g/dL L 51   Protein Total 6.4 6.8 - 8.8 g/dL L 51   Alkaline Phosphatase 93 40 - 150 U/L  51   ALT 17 0 - 50 U/L  51   AST 20 0 - 45 U/L  51            CBC with platelets [[id number]]  Resulted: 01/26/18 2052, Result status: Final result    Ordering provider: Bam Fermin MD  01/26/18 1857 Resulting lab: [facility]    Specimen Information    Type Source Collected On   Blood  01/26/18 2033          Components       Value Reference Range Flag Lab   WBC 6.6 4.0 - 11.0 10e9/L  51   RBC Count 4.02 3.8 - 5.2 10e12/L  51   Hemoglobin 12.2 11.7 - 15.7 g/dL  51   Hematocrit 38.0 35.0 - 47.0 %  51   MCV 95 78 - 100 fl  51   MCH 30.3 26.5 - 33.0 pg  51   MCHC 32.1 31.5 - 36.5 g/dL  51   RDW 13.5 10.0 - 15.0 %  51   Platelet Count 242 150 - 450 10e9/L  51            Testing Performed By     Lab - Abbreviation Name Director Address Valid Date Range    51 - Unknown [facility] Unknown [address] [facility] [address] 12/31/14 1010 - Present            Unresulted Labs (24h ago through future)    Start       Ordered    01/27/18 0700  INR  (warfarin (COUMADIN) Pharmacy Consult-INITIAL ORDER)  DAILY,   Routine      01/26/18 2004      Encounter-Level Documents:     There are no encounter-level documents.      Order-Level  Documents:     There are no order-level documents.

## 2018-01-27 NOTE — PROGRESS NOTES
Preliminary Video EEG impression on January 26, 2018  for the first 20 minutes.  Full report to follow. Please look in inpatient chart, under procedure section.     This video EEG is abnormal due to right temporal slowing, this is consistent with her history of right MCA stroke. There is also some background slowing consistent with a encephalopathy. We will continue to record for target spells.  No epileptiform discharges or electrographic seizures were seen in this record. If events of interest are noted, please press the event button and complete behavioral testing (ROAR testing) if possible. Clinical correlation is advised.     Sugar Coulter MD  Staff Epilepsy Neurologist   [phone]

## 2018-01-27 NOTE — PLAN OF CARE
Problem: Patient Care Overview  Goal: Plan of Care/Patient Progress Review  Pt is AxOx4. VSS. Pt is slightly lethargic. Will arouse easily.  here and spending the night. Pt moved to a private room. EEG monitoring started. Bed pads ordered. Pt ate well and swallowed her medications well. No complaints of pain at this time.   Pt was admitted at about 1815 this evening by MONSE trevino.

## 2018-01-27 NOTE — PROCEDURES
Procedure Date: 01/26/2018      EEG #:  [id number],       VIDEO EEG DAY 1 ON 01/26/2018.      SOURCE FILE DURATION:  1 hour 37 minutes.      PATIENT INFORMATION:  A 77-year-old female with a history of right MCA stroke presents with involuntary upper extremity movements.      EEG is being done to evaluate for seizures.      MEDICATIONS:  Levetiracetam.     TECHNICAL SUMMARY: This video EEG monitoring procedure was performed with 23 scalp electrodes in 10-20 system placements, and additional scalp, precordial and other surface electrodes used for electrical referencing and artifact detection. Video was reviewed intermittently by EEG technologist and physician for electroclinical seizures.      BACKGROUND:  In the fully awake state, the patient does not have a well-formed parietooccipital rhythm.  Her EEG cerebral potentials are low voltage.  There is intermittent generalized delta-theta slowing seen throughout the awake state, and there is a lot of electromyogenic artifact.      ACTIVATION PROCEDURES:  Photic stim and hyperventilation not done.      EPILEPTIFORM DISCHARGES:  None.      ICTAL:  The patient has what appears to be a semi-rhythmic low amplitude right arm tremor which is continuous.  A good sample of the video is at 22:45:16.  She also has her legs flexed.  She is moving them side to side, but this may be volitional.  These are not correlated with EEG changes to suggest seizure activity.      IMPRESSION:  Video EEG day 1 is abnormal due to intermittent generalized theta slowing consistent with a mild encephalopathy.  The patient does have a low amplitude right arm tremor seen throughout the recording on the video.  There is no EEG correlate to suggest this is a seizure.  This tremor does not appear to be a simple motor seizure mainly because it is persistent, and if patient was in a simple motor status epilepticus, we would see some correlation on her EEG. Additionally, there is pronounced intermittent  focal slowing in the right temporal region suggestive of cortical dysfunction in this region.  These EEG findings are consistent with patient's history of right MCA territory stroke.  No clinical seizures nor epileptiform discharges were seen.         SANDY HUTTON MD             D: 2018   T: 2018   MT: GALE      Name:     FRAN YAO   MRN:      [id number]        Account:        [id number]   :      1940           Procedure Date: 2018      Document: [id number]

## 2018-01-27 NOTE — MR AVS SNAPSHOT
After Visit Summary   2018    Marcia Kelley    MRN: [id number]           Patient Information     Date Of Birth          1940        Visit Information        Provider Department      2018 7:00 AM [facility] EEG TECH 4 [facility] EEG        Today's Diagnoses     Seizures (H)    -  1       Follow-ups after your visit        Who to contact     Please call your clinic at [phone] to:    Ask questions about your health    Make or cancel appointments    Discuss your medicines    Learn about your test results    Speak to your doctor   If you have compliments or concerns about an experience at your clinic, or if you wish to file a complaint, please contact [facility] Patient Relations at [phone] or email us at [email or web address]         Additional Information About Your Visit        MyChart Information     [employer] is an electronic gateway that provides easy, online access to your medical records. With [employer], you can request a clinic appointment, read your test results, renew a prescription or communicate with your care team.     To sign up for [employer] visit the website at [email or web address]   You will be asked to enter the access code listed below, as well as some personal information. Please follow the directions to create your username and password.     Your access code is: [email or web address]  Expires: 2018  1:01 PM     Your access code will  in 90 days. If you need help or a new code, please contact your [facility] or call [phone] for assistance.        Care EveryWhere ID     This is your Care EveryWhere ID. This could be used by other organizations to access your [place] medical records  [id number]         Blood Pressure from Last 3 Encounters:   18 118/57   17 137/74    Weight from Last 3 Encounters:   17 76.9 kg (169 lb 8.5 oz)              Today, you had the following     No orders  found for display         Today's Medication Changes      Notice     This visit is during an admission. Changes to the med list made in this visit will be reflected in the After Visit Summary of the admission.             Primary Care Provider    None Specified       No primary provider on file.        Equal Access to Services     JULIEN CARDOSO : Hadii aad ku hadellenpenelope Clifford, wadeidreda luqadaha, sharri kaalmada claudy, orlin wendy haymarjorie wonggingersudhir herrera. So [facility] [phone].    ATENCIÓN: Si habla español, tiene a whittaker disposición servicios gratuitos de asistencia lingüística. Llame al [phone].    We comply with applicable federal civil rights laws and [place] laws. We do not discriminate on the basis of race, color, national origin, age, disability, sex, sexual orientation, or gender identity.            Thank you!     Thank you for choosing [facility]  for your care. Our goal is always to provide you with excellent care. Hearing back from our patients is one way we can continue to improve our services. Please take a few minutes to complete the written survey that you may receive in the mail after your visit with us. Thank you!             Your Updated Medication List - Protect others around you: Learn how to safely use, store and throw away your medicines at www.disposemymeds.org.      Notice     This visit is during an admission. Changes to the med list made in this visit will be reflected in the After Visit Summary of the admission.

## 2018-01-27 NOTE — PROGRESS NOTES
"SPIRITUAL HEALTH SERVICES  SPIRITUAL ASSESSMENT Progress Note  [facility] 7B     PRIMARY FOCUS:     Goals of care    Support for coping    REFERRAL SOURCE: Request for Hospital  at admission.    ILLNESS CIRCUMSTANCES:   Reviewed documentation. Reflective conversation shared with Marcia and Ramana () which integrated elements of illness and family narratives.     Context of Serious Illness/Symptom(s) - Marcia introduced herself with the statement and a smile, \"Look at all these wires. Isn't it ducky?\" She and Ramana described a series of health crises, in March, September and her current illness. \"I lost this year.\"  Resources for Support - Marcia named ramana, and her melisa community, as resources for support \"We have people praying for us all around the world... And I can feel their prayers.\" They showed me a card that read, \"Prayer, the ultimate wireless connection.\"  DISTRESS:     Emotional/Spiritual/Existential Distress - Marcia characterized the last year as \"lost,\" and named disconnection with gardening and walking sources of suffering. She had been working hard to regain function after her stroke, and characterized her recent illness as \"this has set me back to the start.\"    SPIRITUAL/Judaism COPING:     Buddhism/Melisa - Marcia and her  are faithful in the Mormonism melisa.    Spiritual Practice(s) - Mass, Communion and prayer they named as important to them.    Emotional/Relational/Existential Connections - Their melisa community and family are important to them. Marcia and Ramana said that they have 14 grandchildren, and two great grand children on the way. Despite Marcia's health related events, they maintained their holiday gatherings by renting a facility near their house.    GOALS OF CARE:    Goals of Care - To resume her work of recovery of strength and function.    Meaning/Sense-Making - \"It has been a lost year... But I am able to do these things through the prayers of the " "many people who are praying.\"    PLAN: I will follow up once per week for prayer support as Marcia remains on 7B.    Antonio Martinez   Intern  Pager [phone]    "

## 2018-01-27 NOTE — UTILIZATION REVIEW
"  Admission Status; Secondary Review Determination         Under the authority of the Utilization Management Committee, the utilization review process indicated a secondary review on the above patient.  The review outcome is based on review of the medical records, discussions with staff, and applying clinical experience noted on the date of the review.          (x) Observation Status Appropriate - This patient does not meet hospital inpatient criteria and is placed in observation status. If this patient's primary payer is Medicare and was admitted as an inpatient, Condition Code 44 should be used and patient status changed to \"observation\".     RATIONALE FOR DETERMINATION   77 year old female with history of R MCA stroke and one GTC in past who presents with uncontrollable LUE movements. No evidence of acute stroke or status epilepticus.  Her attending physician likely discharging later today. The severity of illness, intensity of service provided, expected LOS and risk for adverse outcome make the care appropriate for further observation; however, doesn't meet criteria for hospital inpatient admission. Dr Lipscomb notified of this determination.    This document was produced using voice recognition software.      The information on this document is developed by the utilization review team in order for the business office to ensure compliance.  This only denotes the appropriateness of proper admission status and does not reflect the quality of care rendered.         The definitions of Inpatient Status and Observation Status used in making the determination above are those provided in the CMS Coverage Manual, Chapter 1 and Chapter 6, section 70.4.      Sincerely,     ODILON DANIELLE MD    System Medical Director  Utilization Management  [facility].      "

## 2018-01-27 NOTE — PHARMACY-ANTICOAGULATION SERVICE
Clinical Pharmacy - Warfarin Dosing Consult     Pharmacy has been consulted to manage this patient s warfarin therapy.  Indication: Atrial Fibrillation  Therapy Goal: INR 2-3  Warfarin Prior to Admission: Yes  Warfarin PTA Regimen: 3 mg on Wednesdays; 4.5 mg all other days. Most recent dosing at OSH was 1/20-1/21: 4.5 mg, 1/22: hold, 1/23-1/24: 3 mg, 1/25: 4.5 mg  Significant drug interactions: acetaminophen    INR   Date Value Ref Range Status   03/22/2017 0.98 0.86 - 1.14 Final   03/21/2017 0.95 0.86 - 1.14 Final   INR at [facility] 1/26/18 @ 0504 = 3.03    Recommend warfarin 3 mg today.  Pharmacy will monitor Marcia Kelley daily and order warfarin doses to achieve specified goal.      Please contact pharmacy as soon as possible if the warfarin needs to be held for a procedure or if the warfarin goals change.      Ana Paulson, MabelD, BCPS

## 2018-01-27 NOTE — PROGRESS NOTES
Preliminary Video EEG impression on January 26-27, 2018  Until 6am  Full report to follow. Please look in inpatient chart, under procedure section.     This video EEG is abnormal due to right temporal slowing, this is consistent with her history of right MCA stroke. There is also some background slowing consistent with a mild encephalopathy. Patient has upper extremities tremor which has no EEG correlate. She pressed the event button persistent e will continue to record for target spells.  No epileptiform discharges or electrographic seizures were seen in this record. If events of interest are noted, please press the event button and complete behavioral testing (ROAR testing) if possible. Clinical correlation is advised.     Sugar Coulter MD  Staff Epilepsy Neurologist   [phone]

## 2018-01-27 NOTE — PROGRESS NOTES
[facility]  General Neurology Progress Note    Patient Name:  Marcia Kelley  MRN:  [id number]    :  1940  Date of Service:  2018    Interval history: EEG running overnight without seizures or epileptic activity. Patient reports being unable to suppress movement though when unaware of examination, there are no abnormal movements of the left arm. She reports she slept well.     ROS  A 10-point ROS was performed as per HPI.     Physical Examination   Vitals: /55 (BP Location: Right arm)  Pulse 54  Temp 98.5  F (36.9  C) (Oral)  Resp 16  SpO2 95%  General: Adult, in NAD, cooperative  HEENT: NC/AT, no icterus, op pink and moist  Cardiac: RRR no M  Chest: CTAB no w/c/r  Abdomen: S/NT/ND  Extremities: No LE swelling.  Skin: No rash or lesion.   Psych: Flat affect    Neuro:  Mental status: Awake, alert, attentive, oriented to self, date, place, time.   Cranial nerves: Eyes conjugate, PERRLA, EOMI, VFF, left lower facial droop, tongue/uvula midline.   Motor: Increased tone in LUE. 5/5 strength in RUE, LUE 2/5 deltoid, 3/5 bicep and tricep, weak  strength. LEs   Reflexes: 3/4 on L, 2/4 on R.  Sensory: Right sensation intact light touch and pain. LLE not intact to pinch, LUE intact to pinch but not light touch. LLE sensory neglect.  Coordination: FNF no dysmetria on R  Gait: Deferred due to weakness    Investigations   CBC, BMP WNL  INR 2.18    VEEG: no epileptic activity thus far    Assessment and Plan:  Marcia Kelley is a 77 year old female with history of R MCA stroke and one GTC in past who presents with uncontrollable LUE movements.       Plan:  #LUE twitching  Irregular, non ryhthmic LUE movements without EEG correlate at our institution thus far, as well as at OSH prior to transfer, unlikely to be seizure. Movements are reportedly not suppressible but disappear in sleep and when are absent when patient is unaware of being observed. Movements are  not myoclonic in naturel and MRI C spine in care everywhere did not reveal any lesion that could cause segmental myoclonus. Perhaps these movements are secondary to shoulder pain as she has increased tone and restricted ROM In that shoulder.   - Will d/c EEG after final report from Dr. Coulter  - Neurochecks  - Seizure precautions    # Shoulder pain: Scheduled tylenol QID alternating with NSAIDs TID.     #Hx R MCA stroke:  Afib found on holter monitor per report  - Continue coumadin, pharmacy to dose  - INR     #HTN: continue amlodipine     #HLD: Continue lipitor      Code: Full  FEN:  Regular diet  Px:  SCD's and warfarin  Dispo: 1-2 days pending eEG monitoring.        Disposition Plan   Expected discharge in 0-1 days to prior living arrangement vs TCU once bed available if needed.     Entered: Nuria Jones 01/27/2018, 7:05 AM     Patient was seen and discussed with Dr. Lipscomb.     Nuria Jones DO  Neurology PGY2  P: [phone]    Patient seen and examined by me on January 27, 2018  Agree with note above by Dr. Jones dated January 27, 2018  Trial of pain meds to see if tremor improves  Alec Lipscomb MD  January 27, 2018

## 2018-01-27 NOTE — DISCHARGE SUMMARY
[facility] Discharge Summary  Neurology Service    Marcia Kelley  MRN# [id number]    Age: 77 year old year old YOB: 1940      Date of Admission:  2018   Date of Discharge::  2018    Discharged to: TCU          Admission Diagnoses:   LUE twitching  H/o R MCA stroke  H/o GTC   A Fib   HLD  HTN   Left shoulder pain           Discharge Diagnosis:   LUE twitching  H/o R MCA stroke  H/o GTC   A Fib   HLD  HTN   Left shoulder pain           Procedures:   EE/27:  EE, Preliminary read:  Video EEG day 1 is abnormal due to intermittent generalized theta slowing consistent with a mild encephalopathy.  The patient does have a low amplitude right arm tremor seen throughout the recording on the video.  There is no EEG correlate to suggest this is a seizure.  This tremor does not appear to be a simple motor seizure mainly because it is persistent, and if patient was in a simple motor status epilepticus, we would perhaps see something on her EEG.  Additionally, there is more pronounced intermittent focal slowing in the right temporal region suggestive of cortical dysfunction in this region.  This is consistent with patient's history of right MCA territory stroke.  No clinical seizures nor epileptiform discharges were seen.         Imaging:   No new imaging done during admission.  Old imaging reviewed through report on Care-Everywhere          Discharge Medications:     Current Discharge Medication List      START taking these medications    Details   warfarin (COUMADIN) 3 MG tablet Take 1 tablet (3 mg) by mouth daily  Qty: 30 tablet, Refills: 1    Associated Diagnoses: Cerebrovascular accident (CVA) due to embolism of right middle cerebral artery (H)      ibuprofen (ADVIL/MOTRIN) 200 MG tablet Take 1 tablet (200 mg) by mouth 3 times daily  Qty: 100 tablet    Associated Diagnoses: Shoulder injury, left, initial encounter      levETIRAcetam 1000 MG TABS Take 1,000 mg by mouth 2 times daily  Qty: 60  tablet    Associated Diagnoses: Seizures (H)      atorvastatin (LIPITOR) 40 MG tablet Take 1 tablet (40 mg) by mouth daily  Qty: 30 tablet    Associated Diagnoses: Cerebrovascular accident (CVA) due to embolism of right middle cerebral artery (H)         CONTINUE these medications which have NOT CHANGED    Details   SERTRALINE HCL PO Take 50 mg by mouth At Bedtime      co-enzyme Q-10 100 MG CAPS capsule Take 1 capsule (100 mg) by mouth daily  Refills: 0    Associated Diagnoses: Acute ischemic stroke (H)      aspirin  MG EC tablet Take 1 tablet (325 mg) by mouth daily  Qty: 40 tablet    Associated Diagnoses: Acute ischemic stroke (H)      cloNIDine (CATAPRES) 0.1 MG tablet Take 1 tablet (0.1 mg) by mouth every 4 hours as needed (SBP > 160)  Qty: 30 tablet    Associated Diagnoses: Acute ischemic stroke (H)      amLODIPine (NORVASC) 10 MG tablet Take 1 tablet (10 mg) by mouth daily  Qty: 30 tablet    Associated Diagnoses: Acute ischemic stroke (H)      senna-docusate (SENOKOT-S;PERICOLACE) 8.6-50 MG per tablet Take 1-2 tablets by mouth 2 times daily as needed (constipation )  Qty: 100 tablet    Associated Diagnoses: Acute ischemic stroke (H)      potassium phosphate, monobasic, (K-PHOS) 500 MG tablet Take 1 tablet (500 mg) by mouth daily    Associated Diagnoses: Acute ischemic stroke (H)      cholecalciferol 2000 UNITS tablet Take 2,000 Units by mouth daily  Qty: 30 tablet    Associated Diagnoses: Acute ischemic stroke (H)                  Consultations:     No consultations were requested during this admission          Brief History of Illness:     Marcia Kelley is a 77 year old female with history of R MCA stroke in 3/2017 felt to due to afib as well as seizure in 10/2017 who presents with uncontrollable LUE movements since 1/20/2017.   She states she overexerted herself that day, but then had sudden onset of twitching in LUE that she felt was seizure activity.  She went to OSH who did EEG that captured spell  but there was no EEG correlate.  MRI brain was unchanged and MRI  C-spine showed severe bilateral neural foraminal stenosis.   There was concern that this may be conversion disorder but elected to transfer to [facility] for further work up to pursue alternative etiologies.  Since hospitalization she states movements have not improved despite additional trial of Zonegran at OSH.    She feels more fatigued and says LUE is more weak.   Movements do apparently disappear during sleep.   She feels generally weak and tired and states she has not found anything to make movements disappear or improve.    See H&P for additional details.  Further chart review done on 1/28/2018: Seizure disorder summarized in 9/11/2017 Office note by Dr. Jamaal Guadarrama.  9/2/2017 admission for possible complex partial seizure and had EEG monitoring w/out seizure activity reported with movement. Discharged on Phenytoin and then switched to Keppra at some point in October 2017 (reasoning unknown upon initial review).  9/2/2017 admission was due to fall where she hit her head with suspicion that a seizure caused the fall (unknown if witnessed).  No seizure history during March of 2017 due to R-posterior MCA infarction (given tPA for R-M1) and some hemorrhagic transformation ultimately leaving her with left sided weakness, severe cognitive/linguistic impairments, inattention/destractibility, Left visual inattention, and reduced executive dysfunction.          Hospital Course (by problem list):     # LUE twitching  Irregular, non ryhthmic LUE movements without EEG correlate at our institution, as well as at OSH prior to transfer, unlikely to be seizure. EEG did reveal focal right temporal slowing consistent with her previous infarct. Movements are reportedly not suppressible but disappear in sleep and when are absent when patient is unaware of being observed. Movements are not myoclonic in nature and MRI C spine in care everywhere did not reveal any  lesion that could cause segmental myoclonus. Perhaps these movements are in response to left shoulder pain, which she is likely neglecting as she has some left hemisensory neglect on exam. She has increased tone of the LUE and restricted ROM In the left shoulder. Tylenol and ibuprofen were scheduled in alternating doses to see if reduced pain reduces the movements. She has been doing PT for this shoulder but would benefit from aggressive PT to reduce risk of frozen shoulder.   - Continuing Keppra 1000 mg BID upon discharge, but we would recommend this medication be reconsidered in 2-4 weeks as it may not be needed for seizure prophylaxis considering there has been no convincing evidence that the patient is having epileptic events.    #Hx R MCA stroke:  Afib found on holter monitor per report  - Continue coumadin at home dose      #HTN: continue amlodipine at home dose      #HLD: Continue lipitor at home dose    #Depression: continue Zoloft at last dose.  It does seem that at one point the patient was on 100 mg QD but we cannot confirm this dose    - Zoloft 50 mg QD       ITEMS TO FOLLOW-UP ON AFTER DISCHARGE   - Follow up with PCP to discuss ongoing Antidepressant therapy  - Follow up with PT as outpatient to determine treatment plan for possible conversion and limit risk for developing frozen shoulder         Physical Exam:     /56 (BP Location: Right arm)  Pulse (!) 45  Temp 96.6  F (35.9  C) (Oral)  Resp 16  SpO2 95%   General:  NAD  Head:  Normocephalic, atraumatic  Eyes:  No conjunctival injection, no scleral icterus.   Mouth:  No oral lesions, no erythema or exudate in the oropharynx  Respiratory:  No respiratory distress  Cardiovascular:  RRR  Abdomen:  Soft  Extremities:  No peripheral edema     General: NAD, cooperative  HEENT: NC/AT  Cardiac: RRR no M  Chest: CTAB no w/c/r  Abdomen: S/NT/ND  Extremities: No LE swelling  Skin: No rash or lesion  Neuro:  Mental status: Awake, alert, attentive,  oriented to time, place, situation, and follows multiple step commands  Cranial nerves: Eyes conjugate, PERRLA, EOMI, VFF, left lower facial droop, tongue/uvula midline.   Motor: Increased tone in LUE. 5/5 strength in RUE, LUE 2/5 deltoid, 3/5 bicep and tricep, weak  strength. LEs   Reflexes: 2/4 on L, 2/4 on R UE b/l and LE b/l.  Sensory: Vague sensory deficits throughout LLE, RLE that still indicate intact sensation just a dulling of the modality.   Coordination: FNF smooth w/out dysmetria or ataxia b/l.  Gait: Stands only with assistance.    DISCHARGE ORDERS  General info for SNF   Length of Stay Estimate: Short Term Care: Estimated # of Days 31-90  Condition at Discharge: Stable  Level of care:skilled   Rehabilitation Potential: Fair  Admission H&P remains valid and up-to-date: Yes  Recent Chemotherapy: N/A  Use Nursing Home Standing Orders: Yes     Mantoux instructions   Give two-step Mantoux (PPD) Per Facility Policy Yes     Reason for your hospital stay   To evaluate your left upper extremity twitching and shoulder pain. There were no seizures on EEG but your shoulder pain is thought to causing irregular movements.     General info for SNF   Length of Stay Estimate: Short Term Care: Estimated # of Days <30  Condition at Discharge: Stable  Level of care:skilled   Rehabilitation Potential: Good  Admission H&P remains valid and up-to-date: Yes  Recent Chemotherapy: N/A  Use Nursing Home Standing Orders: Yes     Mantoux instructions   Give two-step Mantoux (PPD) Per Facility Policy Yes     Reason for your hospital stay   You were transferred to the [facility] for further investigation into your atypical arm and leg movements.  There was initial concern that you may be having myoclonic seizures.  After EEG was done, it was determined that your movements were not associated with increased electrical activity of the brain causing a complex partial, or focal Seizure.  You likely have had ongoing deficits from your  prior stroke in 03/2017 that left you with considerable difficulties in cognitive function, attention, and ability to recognize things (your own body included) on your left side.  These deficits likely have lead to your atypical movements and should improve over time and with specific post-stroke therapies as well as mix of conversion disorder therapies.  You were discharged to a TCU for further rehab and cares.  Continuation of your Keppra may not be required, but ultimately we would rather have your family or current neurologist make this discontinuation recommendation.     Activity - Up with nursing assistance     Follow Up (Transitional Care Management)   Follow up with primary care provider, within 7 days to evaluate medication change.  No follow up labs or test are needed.      Appointments on [facility] and/or [facility] (with [facility] or [facility] provider or service). Call [phone] if you haven't heard regarding these appointments within 7 days of discharge.     Follow Up ([facility])   Follow up with primary care provider, within 7 days to evaluate medication change and for hospital follow- up.  No follow up labs or test are needed.  Please follow up with antidepressant medication recommendations as well as consultation for neurology if needed.     Appointments on [facility] and/or [facility] (with [facility] or [facility] provider or service). Call [phone] if you haven't heard regarding these appointments within 7 days of discharge.     Full Code     Full Code     Physical Therapy Adult Consult   Evaluate and treat as clinically indicated.    Reason: Post stroke left weakness especially L shoulder and LUE     Occupational Therapy Adult Consult   Evaluate and treat as clinically indicated.  Reason:  Post stroke          Fall precautions     Advance Diet as Tolerated   Follow this diet upon discharge: Orders Placed This Encounter     Advance Diet as Tolerated: Regular Diet Adult     Advance Diet as Tolerated    Follow this diet upon discharge: Orders Placed This Encounter     Advance Diet as Tolerated: Regular Diet Adult     Advance Diet as Tolerated       Patient discussed with Neurology Staff Dr. Lipscomb.     GENIA Fowler  PGY4 Neurology     Patient seen and examined by me on January 28, 2018  Agree with note above by Dr. Fowler, dated January 28, 2018  She should benefit from management of left shoulder pain and reduced mobility.   Discharge planning was less than 30 minutes  Alec Lipscomb MD  January 28, 2018

## 2018-01-27 NOTE — PLAN OF CARE
Problem: Patient Care Overview  Goal: Plan of Care/Patient Progress Review  Outcome: No Change  /55 (BP Location: Right arm)  Pulse 54  Temp 98.5  F (36.9  C) (Oral)  Resp 16  SpO2 95%  Pt Afebrile, AxOx4. VSS. Pt is denies pain/ nauseas.Patient voiding small amounts, patient being monitored by EEG. Patients seizure pads placed. No seizures observed.Patients  left during night. Patient appears rest between cares, calls appropriate. Cont with POC.

## 2018-01-27 NOTE — H&P
[facility]  Neurology Admission  1/26/2018      Marcia Kelley MRN# [id number]   YOB: 1940 Age: 77 year old      Date of Admission:  1/26/2018    Primary care provider: No primary care provider on file.         Assessment and Plan:   Assessment: Marcia Kelley is a 77 year old female with history of R MCA stroke and one GTC in past who presents with uncontrollable LUE movements.      Plan:  #LUE twitching:  Patients movements are irregular, not rhythmic with at least partial entrainment.   Also EEG at outside facility apparently captured movements with no epileptiform correlate.  This makes epileptiform activity unlikely.  Her werakness in this extremity is primarily pyramidal related to MCA stroke.  Movements also go away in sleep per report.     I suspect this is conversion disorder but we will obtain EEG overnight to evaluate and consider other etiologies including tremor and myoclonus although cortical myoclonus should show up on EEG as well.   - VEEG overnight  - Continue Keppra 1000mg BID for now  - Neurochecks  - Seizure precautions    #Hx R MCA stroke:  Afib found on holter monitor per report  - Continue coumadin, pharmacy to dose  - INR    #HTN: continue amlodipine    #HLD: Continue lipitor.        Code: Full  FEN:  Regular diet  Px:  SCD's and warfarin  Dispo: 1-2 days pending eEG monitoring.      Patient discussed with Dr. Lipscomb, attending.    Jaswinder Marks  PGY-4 Neurology  [phone]            Chief Complaint:   LUE twitching       History is obtained from the patient and/or family and medical record      Marcia Kelley is a 77 year old female with history of R MCA stroke in 3/2017 felt to due to afib as well as seizure in 10/2017 who presents with uncontrollable LUE movements since 1/20/2017.   She states she overexerted herself that day, but then had sudden onset of twitching in LUE that she felt was seizure activity.  She went to OSH who did EEG  that captured spell but there was no EEG correlate.  MRI brain was unchanged and MRI  C-spine showed severe bilateral neural foraminal stenosis.   There was concern that this may be conversion disorder but elected to transfer to [facility] for further work up to pursue alternative etiologies.  Since hospitalization she states movements have not improved despite additional trial of Zonegran at OSH.    She feels more fatigued and says LUE is more weak.    Movements do apparently disappear during sleep.   She feels generally weak and tired and states she has not found anything to make movements disappear or improve.   She denies new vision loss, fever, chills, diarrhea, shortness of breath or chest pain.              Past Medical History:   No past medical history on file.      R MCA stroke  HTN  HLD  Afib  Seizure         Social History:     Social History     Social History     Marital status:      Spouse name: N/A     Number of children: N/A     Years of education: N/A     Occupational History     Not on file.     Social History Main Topics     Smoking status: Never Smoker     Smokeless tobacco: Not on file     Alcohol use Yes     Drug use: No     Sexual activity: Not on file     Other Topics Concern     Not on file     Social History Narrative     No narrative on file             Family History:   No family history on file.  No history of seizures known.         Allergies:    No Known Allergies          Medications:     Prescription Medications as of 1/26/2018             co-enzyme Q-10 100 MG CAPS capsule Take 1 capsule (100 mg) by mouth daily    aspirin  MG EC tablet Take 1 tablet (325 mg) by mouth daily    cloNIDine (CATAPRES) 0.1 MG tablet Take 1 tablet (0.1 mg) by mouth every 4 hours as needed (SBP > 160)    amLODIPine (NORVASC) 10 MG tablet Take 1 tablet (10 mg) by mouth daily    senna-docusate (SENOKOT-S;PERICOLACE) 8.6-50 MG per tablet Take 1-2 tablets by mouth 2 times daily as needed (constipation )  "   potassium phosphate, monobasic, (K-PHOS) 500 MG tablet Take 1 tablet (500 mg) by mouth daily    cholecalciferol 2000 UNITS tablet Take 2,000 Units by mouth daily      Facility Administered Medications as of 1/26/2018             naloxone (NARCAN) injection 0.1-0.4 mg Inject 0.25-1 mLs (0.1-0.4 mg) into the vein every 2 minutes as needed for opioid reversal    acetaminophen (TYLENOL) tablet 650 mg Take 2 tablets (650 mg) by mouth every 4 hours as needed for mild pain    senna-docusate (SENOKOT-S;PERICOLACE) 8.6-50 MG per tablet 1 tablet Take 1 tablet by mouth 2 times daily as needed for constipation    Linked Group 1:  \"Or\" Linked Group Details     senna-docusate (SENOKOT-S;PERICOLACE) 8.6-50 MG per tablet 2 tablet Take 2 tablets by mouth 2 times daily as needed for constipation    Linked Group 1:  \"Or\" Linked Group Details     polyethylene glycol (MIRALAX/GLYCOLAX) Packet 17 g Take 17 g by mouth daily as needed for constipation    bisacodyl (DULCOLAX) Suppository 10 mg Place 1 suppository (10 mg) rectally daily as needed for constipation    amLODIPine (NORVASC) tablet 10 mg Take 1 tablet (10 mg) by mouth daily    atorvastatin (LIPITOR) tablet 40 mg Starting on 1/27/2018. Take 2 tablets (40 mg) by mouth daily    levETIRAcetam (KEPPRA) tablet 1,000 mg Take 2 tablets (1,000 mg) by mouth 2 times daily    Warfarin Therapy Reminder (Check START DATE - warfarin may be starting in the FUTURE) 1 each continuous prn    aspirin EC EC tablet 325 mg (Discontinued) Take 1 tablet (325 mg) by mouth daily                Review of Systems:   10 point review of systems negative except for what is stated in HPI.           Physical Exam:   There were no vitals taken for this visit.   Physical Exam:   General: NAD  HEENT: sclera anicteric  Resp: Breathing comfortably, no respiratory distress  CVS: RRR  Extremities: No edema, but cuts and ulcer in distal RLE.    Neurologic:   Mental Status Exam: Alert, awake and oriented to person, " place and time. No dysarthria. Speech of normal fluency.  Name and repeats appropriately.  Follows multistep commands.     Cranial Nerves: PERRL, EOMs intact, no nystagmus, L homonymous hemianopia, Mild L facial asymmetry, facial sensation intact to light touch, hearing intact to conversation, palate and uvula rise symmetrically, no deviation in uvula or tongue, mild tongue tremor.     Motor: LUE increased tone with spasticity.  LLE increased tone with some spasticity.  R sided strength 5/5 diffusely.  LUE strength 3/5 throughout.   LLE 3/5 in proximal muscles 4+/5 distally.   Movements in LUE primarily proximal arrhythmic, not regular, with some entrainment with movements of R hand although never completely disappears.  If arm restrained when released movements worsened for short period of time.   LLE internally rotated.     Sensory: LUE/LLE impaired to light touch.  R side intact.    Coordination: Finger-nose-finger intact on R, L impaired by weakness. .    Reflexes: L upgoing toe.  R toe downgoing.    LUE reflexes 3+, 2+ on R.    3+ in LLE compared to 2+ RLE.     Gait: deferred          Data:   CBC:  Lab Results   Component Value Date    WBC 5.7 03/31/2017     Lab Results   Component Value Date    HGB 11.9 03/31/2017     Lab Results   Component Value Date    HCT 35.0 03/31/2017     Lab Results   Component Value Date     03/31/2017       Last Basic Metabolic Panel:  Lab Results   Component Value Date     03/31/2017      Lab Results   Component Value Date    POTASSIUM 3.4 03/31/2017     Lab Results   Component Value Date    CHLORIDE 108 03/31/2017     Lab Results   Component Value Date    RADHA 7.8 03/31/2017     Lab Results   Component Value Date    CO2 26 03/31/2017     Lab Results   Component Value Date    BUN 15 03/31/2017     Lab Results   Component Value Date    CR 0.56 03/31/2017     Lab Results   Component Value Date    GLC 95 03/31/2017       MRI:  Chronic R MCA Infract per outside read.       Patient seen and examined by me on January 27, 2018  Agree with note above by Dr. Marks, dated January 26, 2017  She has point tenderness in L shoulder and pain with passively abducting arm suggestive of frozen shoulder. Will give non-sedating pain med to see if any improvement in tremor.   Alec Lipscomb MD  January 27, 2018

## 2018-01-28 PROBLEM — R25.9 ABNORMAL MOVEMENT: Status: ACTIVE | Noted: 2018-01-01

## 2018-01-28 NOTE — PLAN OF CARE
Problem: Patient Care Overview  Goal: Interdisciplinary Rounds/Family Conf  Outcome: No Change  Pt bradycardic, other vitals are stable, c/o pain/ discomfort left shoulder- Ibuprofen and Tylenol given with some relief. Pt with visible tremors on the left upper extremity. Pt up to commode with assist of 1-2 people. Voiding, declined stool softener- last BM 2 days ago. Skin abrasion noted on left second toe- scabbing. [facility] transport here to take patient to TCU,  at bedside supportive. Cont with discahrge plans.

## 2018-01-28 NOTE — PROCEDURES
Procedure Date: 01/27/2018      EEG NUMBER:  -2       Video EEG day 2, on 01/27/2018.      SOURCE FILE DURATION:  13 hours and 53 minutes.      PATIENT INFORMATION:  A 77-year-old female with a history of right MCA stroke and 1 generalized tonic-clonic seizure in the past, presents with involuntary left upper extremity movement.  EEG is being done to evaluate for seizures.      MEDICATIONS:  Levetiracetam 1000 mg twice a day.     TECHNICAL SUMMARY: This continuous video- EEG monitoring procedure was performed with 23 scalp electrodes in 10-20 electrode system placements, and additional scalp, precordial and other surface electrodes used for electrical referencing and artifact detection.  Video monitoring was utilized and periodically reviewed by EEG technologists and the physician for electroclinical correlations.     BACKGROUND:  The patient has a parietooccipital rhythm of predominantly 7-8 Hz.  She, in rare instances, does have a parietooccipital rhythm up to 9 Hz.  Additionally, throughout the recording the patient has intermittent right temporal lobe delta-theta slowing identified throughout the recording.  In sleep, the patient had some sleep architecture identified such as sleep spindles, they are reduced in the right hemisphere. At times they appear asymmetric.  There are vertex waves and well formed.        Epileptiform discharges were not seen.      ACTIVATION PROCEDURES:  The patient was tested by staff.  She was asked to squeeze the right hand which she was able to squeeze, the left hand squeeze was slightly weak.  She had semirhythmic involuntary movements of the entire left arm and hand.  She is not able to elevate her left arm when asked.  She does raise her right arm, but is not able to raise her left arm due to pain in the left shoulder.  When asked to complete finger tapping, she is able to tap her fingers on the right, but the left arm she has decreased dexterity and has difficulty with  finger tapping.  When asked orientation questions, she is able to answer date of birth, year, date, and month.  She is able to count backwards.  She is able to answer questions appropriately.  Throughout the entire baseline testing, she continued to have semirhythmic involuntary twitches or movements of her left arm and shoulder.      ICTAL:  None.      IMPRESSION:  Video EEG day 2 is abnormal due to the presence of background slowing and intermittent theta slowing in the right temporal region.  These findings are consistent with cortical dysfunction in the right temporal region and a mild encephalopathy.  Focal slowing in the right temporal region is due to her history of right MCA stroke.  Her involuntary left upper extremity movements were examined and it seems that these are persistent throughout the recording. She does have decreased motor dexterity in the left arm and left hand and does have some pain in her left shoulder upon testing.  She had no EEG correlates with these specific spells.  These do not appear to be seizures clinically and electrographically, there was no correlate.  Additionally, the patient had no epileptiform discharges or electrographic seizures on this recording.      SUMMARY OF 2 DAYS OF RECORDING: The patient had persistent left and, at times, right arm tremoring with no associated EEG changes to suggest seizure activity.  No epileptiform discharges were seen.  The patient had background slowing and intermittent right temporal region theta slowing.  These findings were consistent with a mild encephalopathy and cortical dysfunction in the right temporal region, consistent with her history of right MCA stroke.         SANDY HUTTON MD             D: 2018   T: 2018   MT: LILI      Name:     FRAN YAO   MRN:      [id number]        Account:        [id number]   :      1940           Procedure Date: 2018      Document: [id number]

## 2018-01-28 NOTE — PLAN OF CARE
Problem: Patient Care Overview  Goal: Plan of Care/Patient Progress Review  PT: PT order received, and appreciated. At this time  has determined safe discharge plan for pt to discharge to TCU for rehab. Pt is OBS status, PT will hold eval because safe discharge plan has been determined and PT eval would be unnecessary utilization of services at this time. If discharge plan changes significantly, please notify PT.

## 2018-01-28 NOTE — PROGRESS NOTES
Neurology Progress Note  Marcia Kelley  [id number]  2018      Subjective:  No acute events overnight.    Further chart review: Seizure disorder summarized in 2017 Office note by Dr. Jamaal Guadarrama.  2017 admission for possible complex partial seizure and had EEG monitoring w/out seizure activity reported with movement. Discharged on Phenytoin and then switched to Keppra at some point in 2017 (reasoning unknown upon initial review).  2017 admission was due to fall where she hit her head with suspicion that a seizure caused the fall (unknown if witnessed).  No seizure history during 2017 due to R-posterior MCA infarction (given tPA for R-M1) and some hemorrhagic transformation ultimately leaving her with left sided weakness, severe cognitive/linguistic impairments, inattention/destractibility, Left visual inattention, and reduced executive dysfunction.    Objective:  /63 (BP Location: Right arm)  Pulse 55  Temp 96.6  F (35.9  C) (Oral)  Resp 16  SpO2 93%  General: NAD, cooperative  HEENT: NC/AT  Cardiac: RRR no M  Chest: CTAB no w/c/r  Abdomen: S/NT/ND  Extremities: No LE swelling  Skin: No rash or lesion  Neuro:  Mental status: Awake, alert, attentive, oriented to time, place, situation, and follows multiple step commands  Cranial nerves: Eyes conjugate, PERRLA, EOMI, VFF, left lower facial droop, tongue/uvula midline.   Motor: Increased tone in LUE. 5/5 strength in RUE, LUE 2/5 deltoid, 3/5 bicep and tricep, weak  strength. LEs   Reflexes: 2/4 on L, 2/4 on R UE b/l and LE b/l.  Sensory: Vague sensory deficits throughout LLE, RLE that still indicate intact sensation just a dulling of the modality.   Coordination: FNF smooth w/out dysmetria or ataxia b/l.  Gait: Deferred due to weakness    Pertinent Imaging and Labs:  EE, Preliminary read:  Video EEG day 1 is abnormal due to intermittent generalized theta slowing consistent with a mild  encephalopathy.  The patient does have a low amplitude right arm tremor seen throughout the recording on the video.  There is no EEG correlate to suggest this is a seizure.  This tremor does not appear to be a simple motor seizure mainly because it is persistent, and if patient was in a simple motor status epilepticus, we would perhaps see something on her EEG.  Additionally, there is more pronounced intermittent focal slowing in the right temporal region suggestive of cortical dysfunction in this region.  This is consistent with patient's history of right MCA territory stroke.  No clinical seizures nor epileptiform discharges were seen.    Assessment/Recommendations:  Marcia Kelley is a 77 year old female with history of R MCA stroke and one GTC in past who presents with uncontrollable LUE movements.        Plan:  #LUE twitching  Irregular, non ryhthmic LUE movements without EEG correlate at our institution thus far, as well as at OSH prior to transfer, unlikely to be seizure. Movements are reportedly not suppressible but disappear in sleep and when are absent when patient is unaware of being observed. Movements are not myoclonic in naturel and MRI C spine in care everywhere did not reveal any lesion that could cause segmental myoclonus. Perhaps these movements are secondary to shoulder pain as she has increased tone and restricted ROM In that shoulder.   - Trial of pain medications lead to improved pain management, but not reduction in movements  - PT as outpatient for post-stroke rehab as well as conversion treatment     #Hx R MCA stroke:  Afib found on holter monitor per report  - Continue coumadin at home dose      #HTN: continue amlodipine at home dose      #HLD: Continue lipitor at home dose    #Depression: continue Zoloft at last dose.  It does seem that at one point the patient was on 100 mg QD but we cannot confirm this dose    - Zoloft 50 mg QD      Code: Full  FEN:  Regular diet  Px:  SCD's and  warfarin  Dispo: Pending TCU placement. Ready for discharge today    Discussed and seen with attending staff physician, MD Aida Plascencia DO  PGY4 Neurology  [phone]    Patient seen and examined by me on January 28, 2018  Agree with note above by Dr. Fowler, dated January 28, 2018  Discharge planning was less than 30 min  Alec Lipscomb MD  January 28, 2018

## 2018-01-28 NOTE — PROGRESS NOTES
Problem: Patient Care Overview  Goal: Plan of Care/Patient Progress Review  Outcome: No Change  Outpatient/Observation goals to be met before discharge home:      1. Diagnostic tests completed - YES  2. Vitals normalized - YES  3. Therapy assesses need for rehab - NO, PT/OT has not yet seen the pt.

## 2018-01-28 NOTE — PROGRESS NOTES
Social Work Services Discharge Note      Patient Name:  Marcia Kelley     Anticipated Discharge Date:  1.28.18    Discharge Disposition:   TCU:  [facility] Phone [phone] Fax [phone]    Following MD:  Facility MD     Pre-Admission Screening (PAS) online form has been completed.  The Level of Care (LOC) is:  Determined  Confirmation Code is:  [id number]     Additional Services/Equipment Arranged:  [employer] will bring the  for the transport     Patient / Family response to discharge plan:   is in agreement - he indicated that they would like a private room and are aware of the private cost (range provided by facility was $30 - $50 / day). He is choosing to have [employer] transport - in the past they used [employer] but if they use this service now then  would have to go home and get her WC. He is willing to pay the higher fee to use [employer] today.      Persons notified of above discharge plan:   (he will update patient), MD, facility Charge, and bedside RN    Staff Discharge Instructions:    1) Please fax discharge orders and signed hard scripts for any controlled substances to [phone]    2) Please print a packet and send with patient.     3) Please call RN to RN report to [phone]    CTS Handoff completed:  NO - no one listed in One Plan        [employer] ride via WC set for 1130 today.           Carolyn SINGER LICSW  1/28/2018    ON CALL PAGER   0800 - 1600   [phone]    ON CALL COVERAGE AFTER 1600  [phone]

## 2018-01-28 NOTE — PLAN OF CARE
Problem: Patient Care Overview  Goal: Plan of Care/Patient Progress Review  IP OT 7B:  Pt was observation pt and per SW, pt was deemed appropriate to transition to TCU for discharge, prior to OT or PT evaluation.  Per chart review, this is appeared appropriate care plan at the time.  Pt now discharged to TCU for further rehab management.

## 2018-01-28 NOTE — PLAN OF CARE
Problem: Patient Care Overview  Goal: Plan of Care/Patient Progress Review  Outcome: No Change  Outpatient/Observation goals to be met before discharge home:    1. Diagnostic tests completed - YES  2. Vitals normalized - YES  3. Therapy assesses need for rehab - NO, PT/OT has not yet seen the pt.       Pt A/O, VSS on RA. C/o pain and tremors to L shoulder, reports pain is managed well w/Tylenol and Ibuprofen scheduled. Denies nausea, tolerating reg diet. Up assist x2. Voiding sm amt, push fluids. Last bm yesterday per pt. Loss of IV access, provider ok to not have one.  at bedside tonight. Plan to discharge tomorrow when bed available at TCU. Continue to monitor and w/POC.

## 2020-10-10 NOTE — PROGRESS NOTES
Social Work: Assessment with Discharge Plan    Patient Name:  Marcia Kelley  :  1940  Age:  77 year old  MRN:  [id number]  Risk/Complexity Score:     Completed assessment with:  Marcia and spouse Oj    Presenting Information   Reason for Referral:  Discharge plan  Date of Intake:  2018  Referral Source:  Physician  Decision Maker:  Magdalena  Alternate Decision Maker:  Oj  Health Care Directive:  Declined completing- she filled one out two times but never signed it.   Living Situation:  House  Previous Functional Status:  Not fully assessed but she had stroke last summer, was in TCU [facility] and has been home for a few months getting outpt PT at [facility]  Patient and family understanding of hospitalization:  To determine cause of tremors. She was admitted to [facility] 2018 and DC'd to [facility] 18. They are aware she is now in observation status  Cultural/Language/Spiritual Considerations:  Judaism. Was a lay   Adjustment to Illness:  Not addressed    Physical Health  Reason for Admission:  No diagnosis found.  Services Needed/Recommended:  TCU    Mental Health/Chemical Dependency  Diagnosis:  Not identified via medical record  Support/Services in Place:    Services Needed/Recommended:      Support System  Significant relationship at present time:  Spouse Oj  Family of origin is available for support:  6 children, 14 grand children 2 freat grand children  Other support available:    Gaps in support system:  None identified  Patient is caregiver to:  None     Provider Information   Primary Care Physician:  No primary care provider on file.   None   Clinic:  No primary physician on file.      :      Financial   Income Source:  halfway  Financial Concerns:  None identified  Insurance:    Payor/Plan Subscriber Name Rel Member # Group #   MEDICARE - MEDICARE F* MARCIA KELLEY  [id number]       ATTN CLAIMS, [address]    Critical access hospital* FRAN YAO  [id number]      [address]       Discharge Plan   Patient and family discharge goal:  To go to a TCU and then return home when stronger  Provided education on discharge plan:  YES  Patient agreeable to discharge plan:  YES  A list of Medicare Certified Facilities was provided to the patient and/or family to encourage patient choice. Patient's choices for facility are:  #1 [facility], [facility], [facility]  Will NH provide Skilled rehabilitation or complex medical:  YES  General information regarding anticipated insurance coverage and possible out of pocket cost was discussed. Patient and patient's family are aware patient may incur the cost of transportation to the facility, pending insurance payment: YES  Barriers to discharge:  Openning    Discharge Recommendations   Anticipated Disposition:  Facility:  TBD  Transportation Needs:  Spouse if able  Name of Transportation Company and Phone:  TBD    Additional comments   Referrals made to [facility] where Pt very much wants to go. They anticipate no openings until Tuesday.Referral to [facility] but no openings this weekend. [facility] is assessing and may have an opening. Contact [phone] for update on Sunday.  Updated spouse and he sent a message to me to also try [facility].  -Pt would like a pvt room if possible.     SW to f/u tomorrow.    Olena Greenebrg, LICSW   [phone]    6

## 2021-05-30 VITALS — BODY MASS INDEX: 25.82 KG/M2 | WEIGHT: 160 LBS

## 2021-05-30 VITALS — WEIGHT: 161 LBS | BODY MASS INDEX: 25.99 KG/M2

## 2021-05-30 VITALS — WEIGHT: 165 LBS | BODY MASS INDEX: 26.63 KG/M2

## 2021-05-30 VITALS — BODY MASS INDEX: 23.89 KG/M2 | WEIGHT: 148 LBS

## 2021-05-31 VITALS — WEIGHT: 157 LBS | BODY MASS INDEX: 23.79 KG/M2 | HEIGHT: 68 IN

## 2021-05-31 VITALS — WEIGHT: 157 LBS | HEIGHT: 68 IN | BODY MASS INDEX: 23.79 KG/M2

## 2021-05-31 VITALS — WEIGHT: 162 LBS | BODY MASS INDEX: 26.15 KG/M2

## 2021-05-31 VITALS — HEIGHT: 68 IN | WEIGHT: 150 LBS | BODY MASS INDEX: 22.73 KG/M2

## 2021-05-31 VITALS — HEIGHT: 67 IN | BODY MASS INDEX: 24.72 KG/M2 | WEIGHT: 157.5 LBS

## 2021-05-31 VITALS — HEIGHT: 68 IN | WEIGHT: 152 LBS | BODY MASS INDEX: 23.04 KG/M2

## 2021-05-31 VITALS — HEIGHT: 67 IN | WEIGHT: 157 LBS | BODY MASS INDEX: 24.64 KG/M2

## 2021-05-31 VITALS — BODY MASS INDEX: 25.5 KG/M2 | WEIGHT: 158 LBS

## 2021-05-31 VITALS — HEIGHT: 68 IN | BODY MASS INDEX: 23.04 KG/M2 | WEIGHT: 152 LBS

## 2021-05-31 VITALS — WEIGHT: 157 LBS | BODY MASS INDEX: 25.34 KG/M2

## 2021-05-31 VITALS — BODY MASS INDEX: 25.18 KG/M2 | WEIGHT: 156 LBS

## 2021-05-31 VITALS — BODY MASS INDEX: 24.8 KG/M2 | WEIGHT: 158 LBS | HEIGHT: 67 IN

## 2021-05-31 VITALS — WEIGHT: 156.4 LBS | BODY MASS INDEX: 25.24 KG/M2

## 2021-05-31 VITALS — WEIGHT: 159 LBS | BODY MASS INDEX: 25.66 KG/M2

## 2021-05-31 VITALS — BODY MASS INDEX: 25.34 KG/M2 | WEIGHT: 157 LBS

## 2021-06-01 VITALS — WEIGHT: 143.8 LBS | BODY MASS INDEX: 21.86 KG/M2

## 2021-06-01 VITALS — WEIGHT: 142 LBS | BODY MASS INDEX: 21.59 KG/M2

## 2021-06-01 VITALS — BODY MASS INDEX: 21.59 KG/M2 | WEIGHT: 142 LBS

## 2021-06-01 VITALS — BODY MASS INDEX: 21.79 KG/M2 | WEIGHT: 143.3 LBS

## 2021-06-01 VITALS — BODY MASS INDEX: 21.67 KG/M2 | WEIGHT: 143 LBS | HEIGHT: 68 IN

## 2021-06-01 VITALS — BODY MASS INDEX: 21.59 KG/M2 | HEIGHT: 68 IN

## 2021-06-01 VITALS — WEIGHT: 144 LBS | BODY MASS INDEX: 21.9 KG/M2

## 2021-06-01 VITALS — BODY MASS INDEX: 21.9 KG/M2 | WEIGHT: 144 LBS

## 2021-06-10 NOTE — PROGRESS NOTES
[facility]      Code Status:  FULL CODE  Visit Type: Review Of Multiple Medical Conditions     Facility:  [facility] [[id number]]           History of Present Illness: Marcia Kelley is a 76 y.o. female who is readmitted to the TCU from hospital with influenza.  She was examined in the presence of her  who supplemented her history. This is a 76-year-old with past medical history significant only for hypertension and who was completely independent with her ADLs using no assist device who presented to the hospital after a fall.  She was admitted with acute right MCA territory stroke associated with left-sided weakness facial groups letting of speech confusion and collapse. She was given IV TPA and from back to me was attempted but was unsuccessful course was complicated by hemorrhage.  Her blood pressures were stable however she had evidence of atrial allocation suggestive of a fib and she was initially started on aspirin and then switched over to Coumadin  she has had significant depression associated lability of mood and poor intake. She was seen by psychiatric and has been started on medications but continues to have a poor appetite and crying during exam   and wife both indicate the goal is that she be ambulatory and the goal is to discharge her home  While in the TCU patient developed a fever of around 100 Fahrenheit and workup was initiated including labs her white count was normal.  However she became hypotensive and her influenza was positive she was sent to the hospital where she was noted to have a UTI and started on antibiotics.  She has been discharged back to the TCU .  She has lost greater than 10 pounds since her last admission due to her poor appetite but refuses to take any medications/supplements  Blood pressures remain soft and she is not eating and drinking in fact last night she took her medications and was gagging and choking on them because  she was nauseated  Unfortunately she remains resistant to the idea of taking any medications and told me she is not too keen to try anything different there was some concern that she may be constipated but she absolutely refuses to take any stool softeners.  Past Medical History:   Diagnosis Date     Acute right MCA stroke      Anorexia      Cranial neuropathies, multiple      Depressive disorder      Hemiplegia affecting left nondominant side      HTN (hypertension)      Hypokalemia      Hypovitaminosis D      Iliotibial band syndrome      Impaired mobility and activities of daily living      Influenza B      Urinary incontinence      No past surgical history on file.  No family history on file.  Social History     Social History     Marital status:      Spouse name: N/A     Number of children: N/A     Years of education: N/A     Occupational History     Not on file.     Social History Main Topics     Smoking status: Never Smoker     Smokeless tobacco: Not on file     Alcohol use Yes      Comment: occasional     Drug use: Not on file     Sexual activity: Not on file     Other Topics Concern     Not on file     Social History Narrative     Current Outpatient Prescriptions   Medication Sig Dispense Refill     amLODIPine (NORVASC) 10 MG tablet Take 10 mg by mouth daily.       ciprofloxacin HCl (CIPRO) 500 MG tablet Take 500 mg by mouth 2 (two) times a day.       coenzyme Q10 100 mg capsule Take 100 mg by mouth daily.       ergocalciferol (ERGOCALCIFEROL) 50,000 unit capsule Take 50,000 Units by mouth once a week. Every Sun.       lidocaine (LIDODERM) 5 % Place 1 patch on the skin daily.       mirtazapine (REMERON) 30 MG tablet Take 30 mg by mouth at bedtime.       ondansetron (ZOFRAN) 4 MG tablet Take 4 mg by mouth every 6 (six) hours as needed for nausea.       senna-docusate (PERICOLACE) 8.6-50 mg tablet Take 1 tablet by mouth every 12 (twelve) hours as needed.       senna-docusate (SENNOSIDES-DOCUSATE  SODIUM) 8.6-50 mg tablet Take 1 tablet by mouth 2 (two) times a day.       sertraline (ZOLOFT) 25 MG tablet Take 25 mg by mouth at bedtime.       WARFARIN SODIUM (WARFARIN ORAL) Take by mouth. 4/24/17 5mg daily recheck INR 4/25/17 4/19/17 5mg daily       No current facility-administered medications for this visit.      No Known Allergies      Review of Systems:    Constitutional: Negative.  Negative for fever, chills, has activity change, appetite change and fatigue.   HENT: Negative for congestion and facial swelling.    Eyes: Negative for photophobia, redness and visual disturbance.   Respiratory: Negative for cough and chest tightness.    Cardiovascular: Negative for chest pain, palpitations and leg swelling.   Gastrointestinal: Negative for nausea, diarrhea, constipation, blood in stool and abdominal distention.   Per nursing she is not having regular BMs.  She was choking and gagging on food and nothing tastes good to her as per her  Genitourinary: Negative.    Musculoskeletal: Negative.  Weak with increased muscle spasms reported by patient on the paralyzed side   Skin: Negative.    Neurological: Negative for dizziness, tremors, syncope, weakness, light-headedness and headaches.   Hematological: Does not bruise/bleed easily.   Psychiatric/Behavioral: Negative.      Vitals:    04/27/17 1555   BP: 111/65   Pulse: 64   Temp: 98  F (36.7  C)       Physical Exam:    GENERAL: no acute distress. Cooperative in conversation.   HEENT: pupils are equal, round and reactive. Oral mucosa is moist and intact.  RESP:Chest symmetric. Regular respiratory rate. No stridor.  CVS: S1S2  ABD: Nondistended, soft.  EXTREMITIES: No lower extremity edema. LEFT SIDED WEAKNESS WITH NEGLECT AND DROOP ; LEFT SIDED VISUAL FIELD CUT AND NEGLECT NOTED  NEURO: non focal. Alert and oriented x3.   PSYCH: within normal limits. No depression or anxiety.  SKIN: warm dry intact       Labs:  INR 3.0  Recent Results (from the past 240 hour(s))    Basic Metabolic Panel   Result Value Ref Range    Sodium 139 136 - 145 mmol/L    Potassium 3.8 3.5 - 5.0 mmol/L    Chloride 104 98 - 107 mmol/L    CO2 26 22 - 31 mmol/L    Anion Gap, Calculation 9 5 - 18 mmol/L    Glucose 135 (H) 70 - 125 mg/dL    Calcium 8.4 (L) 8.5 - 10.5 mg/dL    BUN 20 8 - 28 mg/dL    Creatinine 0.90 0.60 - 1.10 mg/dL    GFR MDRD Af Amer >60 >60 mL/min/1.73m2    GFR MDRD Non Af Amer >60 >60 mL/min/1.73m2   Magnesium   Result Value Ref Range    Magnesium 1.7 (L) 1.8 - 2.6 mg/dL   HM2(CBC w/o Differential)   Result Value Ref Range    WBC 4.6 4.0 - 11.0 thou/uL    RBC 4.08 3.80 - 5.40 mill/uL    Hemoglobin 12.5 12.0 - 16.0 g/dL    Hematocrit 39.9 35.0 - 47.0 %    MCV 98 80 - 100 fL    MCH 30.6 27.0 - 34.0 pg    MCHC 31.3 (L) 32.0 - 36.0 g/dL    RDW 13.7 11.0 - 14.5 %    Platelets 221 140 - 440 thou/uL    MPV 11.7 8.5 - 12.5 fL   Urinalysis-UC if Indicated   Result Value Ref Range    Color, UA Yellow Colorless, Yellow, Straw, Light Yellow    Clarity, UA Cloudy (!) Clear    Glucose, UA Negative Negative    Bilirubin, UA Negative Negative    Ketones, UA Trace (!) Negative    Specific Gravity, UA 1.024 1.001 - 1.030    Blood, UA Negative Negative    pH, UA 5.5 4.5 - 8.0    Protein, UA 30 mg/dL (!) Negative mg/dL    Urobilinogen, UA 2.0 E.U./dL <2.0 E.U./dL, 2.0 E.U./dL    Nitrite, UA Negative Negative    Leukocytes, UA Large (!) Negative    Bacteria, UA Many (!) None Seen hpf    RBC, UA 0-2 None Seen, 0-2 hpf    WBC, UA 25-50 (!) None Seen, 0-5 hpf    Squam Epithel, UA 25-50 (!) None Seen, 0-5 lpf    Mucus, UA Many (!) None Seen lpf   Culture, Urine   Result Value Ref Range    Culture >100,000 col/ml ESBL producing Escherichia coli (!)        Susceptibility    ESBL producing Escherichia coli -  (no method available)     Amoxicillin + Clavulanate 8/4 Resistant      Ampicillin >16 Resistant      Cefazolin >16 Resistant      Cefepime >16 Resistant      Ceftriaxone >32 Resistant      Ciprofloxacin <=0.5  Sensitive      Gentamicin <=2 Sensitive      Levofloxacin <=1 Sensitive      Meropenem <=0.25 Sensitive      Nitrofurantoin 64 Intermediate      Tetracycline >8 Resistant      Tobramycin <=2 Sensitive      Trimethoprim + Sulfamethoxazole <=0.5 Sensitive          Assessment/Plan:   Acute Influenza-given Tamiflu;afebrile; nl WBC    Acute right MCA embolic stroke on 3/21/17 status post TPA at [facility]  thrombectomy was attempted but was unsuccessful  Her course in the hospital was complicated by hemorrhagic conversion     Left sided hemiparesis- patient his here after doing either extensive rehabilitation at [facility] and will continue with gentle rehabilitation and speech therapies in the TCU setting  she remains a two person assist to for transfers but families goal is to make her independent  Unfortunately she has become weaker since her last episode and has been downgraded from one person to 2 person assist currently     Severe loss of appetite- refuses Remeron .     Mood disorder with emotional lability she's on a starting those of Zoloft along with Remeron and was seen by psychiatriy the hospital  continue to monitor mood and behaviors closely . Patient flatly refused to have any adjustment made in her medications.  She has refused to sign the consents to take either of the 2 medications.  I am going to order psychology eval for her     Atrial fibrillation felt to be most likely the probable cause off her recent CVA though she is in sinus rhythm currently she was on aspirin and now has been switched over to Coumadin will monitor INR'     Incontinence of bladder and bowel she will be on time voiding with therapy     Hypertension continue with her blood pressure medications for now. monitor blood pressures.  Due to recent poor intake with nausea and emesis blood pressures have been low so will give hold parameters for her amlodipine     Cognitive impairment associated with significant  left-sided neglect and weakness felt to be a result of vascular dementia associated with his CVA  speech will be working with her families hoping for improvement     Debilitation continue with her PT OT and rehab     Vitamin D deficiency she's on high dozes of supplementation continue to monitor.r/ levels in 4 weeks    UTI-cultures grew ESBL E. coli and she is currently on ciprofloxacin    Nausea with dyspepsia with possible constipation.  She has been given Zofran as needed.  In addition we did order Prilosec 20 mg in the morning for her she was advised to eat before starting her medications  In addition I have advised her to monitor her intake she is already lost greater than 10 pounds recently due to poor intake the plan is that if she does not improve in her intake and there is risk of dehydration we will give her IV fluids.  Total time spent was 35 minutes, more than half of it was in face-to-face counseling regarding disease state, treatment, side effects, documentation, review of clinical data and coordination of care    Electronically signed by: BENNY Bertrand    This progress note was completed using Dragon software and there may be grammatical errors.

## 2021-06-10 NOTE — PROGRESS NOTES
[facility]      Code Status:  FULL CODE  Visit Type: Review Of Multiple Medical Conditions     Facility:  [facility] [[id number]]           History of Present Illness: Marcia Kelley is a 76 y.o. female who is readmitted to the TCU from hospital with influenza.  She was examined in the presence of her  who supplemented her history. This is a 76-year-old with past medical history significant only for hypertension and who was completely independent with her ADLs using no assist device who presented to the hospital after a fall.  She was admitted with acute right MCA territory stroke associated with left-sided weakness facial groups letting of speech confusion and collapse. She was given IV TPA and from back to me was attempted but was unsuccessful course was complicated by hemorrhage.  Her blood pressures were stable however she had evidence of atrial allocation suggestive of a fib and she was initially started on aspirin and then switched over to Coumadin  Patient has made good strides in therapy.  She was fitted with an AFO  brace but at wheelchair level she is contact-guard to standby assist with all her transfers now.  She is eating poorly and refusing her supplements now.  She had some muscle spasms for which she is taking Tylenol and feels that is adequate.  She does not want to try any muscle relaxers as yet.  Her mood is much improved today and she is actually not as tearful in affect.  She was walking using a hemiwalker and her KAFO in place and that did quite wonderfully.  Off note she did sign her Zoloft consent and started the medication about a week ago  She was seen ambulating using a hemiwalker and also frequently without it and without her brace and was counseled not to do it  Past Medical History:   Diagnosis Date     Acute right MCA stroke      Anorexia      Cranial neuropathies, multiple      Depressive disorder      Hemiplegia affecting left  nondominant side      HTN (hypertension)      Hypokalemia      Hypovitaminosis D      Iliotibial band syndrome      Impaired mobility and activities of daily living      Influenza B      Urinary incontinence      No past surgical history on file.  No family history on file.  Social History     Social History     Marital status:      Spouse name: N/A     Number of children: N/A     Years of education: N/A     Occupational History     Not on file.     Social History Main Topics     Smoking status: Never Smoker     Smokeless tobacco: Not on file     Alcohol use Yes      Comment: occasional     Drug use: Not on file     Sexual activity: Not on file     Other Topics Concern     Not on file     Social History Narrative     Current Outpatient Prescriptions   Medication Sig Dispense Refill     acetaminophen (TYLENOL) 325 MG tablet Take 650 mg by mouth every 4 (four) hours as needed for pain.       amLODIPine (NORVASC) 10 MG tablet Take 10 mg by mouth daily.       coenzyme Q10 100 mg capsule Take 100 mg by mouth daily.       lidocaine (LIDODERM) 5 % Place 1 patch on the skin daily.       mirtazapine (REMERON) 30 MG tablet Take 30 mg by mouth at bedtime.       ondansetron (ZOFRAN) 4 MG tablet Take 4 mg by mouth every 6 (six) hours as needed for nausea.       senna-docusate (PERICOLACE) 8.6-50 mg tablet Take 1 tablet by mouth every 12 (twelve) hours as needed.       senna-docusate (SENNOSIDES-DOCUSATE SODIUM) 8.6-50 mg tablet Take 1 tablet by mouth 2 (two) times a day.       sertraline (ZOLOFT) 25 MG tablet Take 25 mg by mouth at bedtime.       WARFARIN SODIUM (WARFARIN ORAL) Take by mouth. 5/15/17 INR 2.6. Take 2.5mg daily. Next INR 5/18/17 5/12/17 INR 4.4. Hold. Next INR 5/15/17  5/1/17 INR 4.6. Hold 5/1. Next INR 5/2 4/27/17 INR 3.94. On call said give 4.5mg. 4/28/17 Hold X2 then 4mg daily. Next INR 5/1 4/24/17 5mg daily recheck INR 4/25/17 4/19/17 5mg daily       No current facility-administered medications for  this visit.      No Known Allergies    Review of Systems:    Constitutional: Negative.  Negative for fever, chills, has activity change, appetite change and fatigue.   HENT: Negative for congestion and facial swelling.    Eyes: Negative for photophobia, redness and visual disturbance.   Respiratory: Negative for cough and chest tightness.    Cardiovascular: Negative for chest pain, palpitations and leg swelling.   Gastrointestinal: Negative for nausea, diarrhea, constipation, blood in stool and abdominal distention.   Genitourinary: Negative.    Musculoskeletal: Negative.  Weak with increased muscle spasms reported by patient on the paralyzed side   Skin: Negative.    Neurological: Negative for dizziness, tremors, syncope, weakness, light-headedness and headaches.   Hematological: Does not bruise/bleed easily.   Psychiatric/Behavioral: Negative.      Vitals:    05/23/17 1016   BP: 118/69   Pulse: 72   Temp: 98  F (36.7  C)       Physical Exam:    GENERAL: no acute distress. Cooperative in conversation.   HEENT: pupils are equal, round and reactive. Oral mucosa is moist and intact.  RESP:Chest symmetric. Regular respiratory rate. No stridor.  CVS: S1S2  ABD: Nondistended, soft.  EXTREMITIES: No lower extremity edema. LEFT SIDED WEAKNESS WITH NEGLECT AND DROOP ; LEFT SIDED VISUAL FIELD CUT AND NEGLECT NOTED  NEURO: non focal. Alert and oriented x3.   PSYCH: within normal limits. No depression or anxiety.  SKIN: warm dry intact     Labs:   Vitamin D, Total (25-Hydroxy)   Date Value Ref Range Status   05/19/2017 33.0 30.0 - 80.0 ng/mL Final     Results for orders placed or performed in visit on 05/03/17   Basic Metabolic Panel   Result Value Ref Range    Sodium 140 136 - 145 mmol/L    Potassium 3.9 3.5 - 5.0 mmol/L    Chloride 108 (H) 98 - 107 mmol/L    CO2 26 22 - 31 mmol/L    Anion Gap, Calculation 6 5 - 18 mmol/L    Glucose 92 70 - 125 mg/dL    Calcium 8.8 8.5 - 10.5 mg/dL    BUN 26 8 - 28 mg/dL    Creatinine 1.51  (H) 0.60 - 1.10 mg/dL    GFR MDRD Af Amer 41 (L) >60 mL/min/1.73m2    GFR MDRD Non Af Amer 34 (L) >60 mL/min/1.73m2     INR 2.1  Assessment/Plan:   Acute right MCA embolic stroke on 3/21/17 status post TPA at [facility]  thrombectomy was attempted but was unsuccessful  Her course in the hospital was complicated by hemorrhagic conversion     Left sided hemiparesis- patient his here [facility] and will continue with gentle rehabilitation and speech therapies in the TCU setting  . she is doing much better and now is a contact-guard to standby assist with her transfers and pretty much independent with most of her ADLs now.has a KAFO now .ambulating with a sujatha walker today     Severe loss of appetite- refuses Remeron .eating better.     Mood disorder with emotional lability -on low dose zoloft     Atrial fibrillation felt to be most likely the probable cause off her recent CVA though she is in sinus rhythm currently she was on aspirin and now has been switched over to Coumadin will monitor INR      Incontinence of bladder and bowel she will be on time voiding with therapy     Hypertension continue with her blood pressure medications for now. monitor blood pressures.       Cognitive impairment associated with significant left-sided neglect and weakness felt to be a result of vascular dementia associated with his CVA  speech will be working with her families hoping for improvement.SLUMS21/30     Debilitation continue with her PT OT and rehab she has had a huge improvement recently      Vitamin D deficiency she's on high dozes of supplementation continue to monitor. As her levels are adequate take her off the high-dose supplement and start him on regular replacement dose of 1000 units daily    muscle spasms-try tynelol as pt refused any muscle relaxer; PT using diathermy and E stem.   tinetti 3/28 on admission and is now 21/28 and [facility] 21/30  She is  making good progress with therapy and was reassured.  She was advised to be compliant with a hemiwalker and her KAFO.  She did see psychology    Electronically signed by: BENNY Bertrand  This progress note was completed using Dragon software and there may be grammatical errors.

## 2021-06-10 NOTE — PROGRESS NOTES
[facility]      Code Status:  FULL CODE  Visit Type: Review Of Multiple Medical Conditions     Facility:  [facility] [[id number]]           History of Present Illness: Marcia Kelley is a 76 y.o. female who is readmitted to the TCU from hospital with influenza.  . This is a 76-year-old with past medical history significant only for hypertension and who was completely independent with her ADLs using no assist device who presented to the hospital after a fall.  She was admitted with acute right MCA territory stroke associated with left-sided weakness , speech difficulty; confusion and collapse. She was given IV TPA and from back to me was attempted but was unsuccessful course was complicated by hemorrhage.  Her blood pressures were stable however she had evidence of atrial allocation suggestive of a fib and she was initially started on aspirin and then switched over to Coumadin.  Patient has made good strides in therapy.  She was fitted with an KAFO  brace but at wheelchair level she is contact-guard to standby assist with all her transfers now..  She had some muscle spasms for which she is taking Tylenol and feels that is adequate.  She does not want to try any muscle relaxers as yet.  Her mood is much improved today and she is actually not as tearful in affect.  She was walking using a hemiwalker and her KAFO in place and that did quite wonderfully.  Off note she did sign her Zoloft consent and started the medication about a week ago  NO CHANGE IN WTS.  Past Medical History:   Diagnosis Date     Acute right MCA stroke      Anorexia      Cranial neuropathies, multiple      Depressive disorder      Hemiplegia affecting left nondominant side      HTN (hypertension)      Hypokalemia      Hypovitaminosis D      Iliotibial band syndrome      Impaired mobility and activities of daily living      Influenza B      Urinary incontinence      No past surgical history on file.  No family  history on file.  Social History     Social History     Marital status:      Spouse name: N/A     Number of children: N/A     Years of education: N/A     Occupational History     Not on file.     Social History Main Topics     Smoking status: Never Smoker     Smokeless tobacco: Not on file     Alcohol use Yes      Comment: occasional     Drug use: Not on file     Sexual activity: Not on file     Other Topics Concern     Not on file     Social History Narrative     Current Outpatient Prescriptions   Medication Sig Dispense Refill     acetaminophen (TYLENOL) 325 MG tablet Take 650 mg by mouth every 4 (four) hours as needed for pain.       amLODIPine (NORVASC) 10 MG tablet Take 10 mg by mouth daily.       coenzyme Q10 100 mg capsule Take 100 mg by mouth daily.       lidocaine (LIDODERM) 5 % Place 1 patch on the skin daily.       mirtazapine (REMERON) 30 MG tablet Take 30 mg by mouth at bedtime.       ondansetron (ZOFRAN) 4 MG tablet Take 4 mg by mouth every 6 (six) hours as needed for nausea.       senna-docusate (PERICOLACE) 8.6-50 mg tablet Take 1 tablet by mouth every 12 (twelve) hours as needed.       senna-docusate (SENNOSIDES-DOCUSATE SODIUM) 8.6-50 mg tablet Take 1 tablet by mouth 2 (two) times a day.       sertraline (ZOLOFT) 25 MG tablet Take 25 mg by mouth at bedtime.       WARFARIN SODIUM (WARFARIN ORAL) Take by mouth. 5/15/17 INR 2.6. Take 2.5mg daily. Next INR 5/18/17 5/12/17 INR 4.4. Hold. Next INR 5/15/17  5/1/17 INR 4.6. Hold 5/1. Next INR 5/2 4/27/17 INR 3.94. On call said give 4.5mg. 4/28/17 Hold X2 then 4mg daily. Next INR 5/1 4/24/17 5mg daily recheck INR 4/25/17 4/19/17 5mg daily       No current facility-administered medications for this visit.      No Known Allergies    Review of Systems:    Constitutional: Negative.  Negative for fever, chills, has activity change, appetite change and fatigue.   HENT: Negative for congestion and facial swelling.    Eyes: Negative for photophobia,  redness and visual disturbance.   Respiratory: Negative for cough and chest tightness.    Cardiovascular: Negative for chest pain, palpitations and leg swelling.   Gastrointestinal: Negative for nausea, diarrhea, constipation, blood in stool and abdominal distention.   Genitourinary: Negative.    Musculoskeletal: Negative.  Weak with increased muscle spasms reported by patient on the paralyzed side   Skin: Negative.    Neurological: Negative for dizziness, tremors, syncope, weakness, light-headedness and headaches.   Hematological: Does not bruise/bleed easily.   Psychiatric/Behavioral: Negative.      Vitals:    05/25/17 1559   BP: 117/73   Pulse: 68   Temp: 98  F (36.7  C)       Physical Exam:    GENERAL: no acute distress. Cooperative in conversation.   HEENT: pupils are equal, round and reactive. Oral mucosa is moist and intact.  RESP:Chest symmetric. Regular respiratory rate. No stridor.  CVS: S1S2  ABD: Nondistended, soft.  EXTREMITIES: No lower extremity edema. LEFT SIDED WEAKNESS WITH NEGLECT AND DROOP ; LEFT SIDED VISUAL FIELD CUT AND NEGLECT NOTED.KAFO NOTED  NEURO: non focal. Alert and oriented x3.   PSYCH: within normal limits. No depression or anxiety.  SKIN: warm dry intact     Labs:   Vitamin D, Total (25-Hydroxy)   Date Value Ref Range Status   05/23/2017 38.2 30.0 - 80.0 ng/mL Final     Results for orders placed or performed in visit on 05/03/17   Basic Metabolic Panel   Result Value Ref Range    Sodium 140 136 - 145 mmol/L    Potassium 3.9 3.5 - 5.0 mmol/L    Chloride 108 (H) 98 - 107 mmol/L    CO2 26 22 - 31 mmol/L    Anion Gap, Calculation 6 5 - 18 mmol/L    Glucose 92 70 - 125 mg/dL    Calcium 8.8 8.5 - 10.5 mg/dL    BUN 26 8 - 28 mg/dL    Creatinine 1.51 (H) 0.60 - 1.10 mg/dL    GFR MDRD Af Amer 41 (L) >60 mL/min/1.73m2    GFR MDRD Non Af Amer 34 (L) >60 mL/min/1.73m2     INR 2.4  Assessment/Plan:   Acute right MCA embolic stroke on 3/21/17 status post TPA at [facility]  thrombectomy was  attempted but was unsuccessful  Her course in the hospital was complicated by hemorrhagic conversion     Left sided hemiparesis- patient his here after doing either extensive rehabilitation at [facility] and will continue with gentle rehabilitation and speech therapies in the TCU setting  . she is doing much better and now is a contact-guard to standby assist with her transfers and pretty much independent with most of her ADLs now.has a KAFO now .ambulating with a sujatha walker today     Severe loss of appetite- refuses Remeron .eating better.     Mood disorder with emotional lability -on low dose zoloft     Atrial fibrillation felt to be most likely the probable cause off her recent CVA though she is in sinus rhythm currently she was on aspirin and now has been switched over to Coumadin will monitor INR .     Incontinence of bladder and bowel she will be on time voiding with therapy.     Hypertension continue with her blood pressure medications for now. monitor blood pressures.       Cognitive impairment associated with significant left-sided neglect and weakness felt to be a result of vascular dementia associated with his CVA  speech will be working with her families hoping for improvement.SLUMS21/30     Debilitation continue with her PT OT and rehab she has had a huge improvement recently      Vitamin D deficiency NOW on regular replacement dose of 1000 units daily    muscle spasms-try tynelol as pt refused any muscle relaxer; PT using diathermy and E stem.   tinetti 3/28 on admission and is now 21/28 and SLUMS 21/30  She is making good progress with therapy and was reassured.  She was advised to be compliant with a hemiwalker and her KAFO.  NO NEW CHANGE AND PROGRESSING WELL IN PT    Electronically signed by: BENNY Bertrand  This progress note was completed using Dragon software and there may be grammatical errors.

## 2021-06-10 NOTE — PROGRESS NOTES
[facility]      Code Status:  FULL CODE  Visit Type: Review Of Multiple Medical Conditions     Facility:  [facility] [[id number]]           History of Present Illness: Marcia Kelley is a 76 y.o. female who is readmitted to the TCU from hospital with influenza.  She was examined in the presence of her  who supplemented her history. This is a 76-year-old with past medical history significant only for hypertension and who was completely independent with her ADLs using no assist device who presented to the hospital after a fall.  She was admitted with acute right MCA territory stroke associated with left-sided weakness facial groups letting of speech confusion and collapse. She was given IV TPA and from back to me was attempted but was unsuccessful course was complicated by hemorrhage.  Her blood pressures were stable however she had evidence of atrial allocation suggestive of a fib and she was initially started on aspirin and then switched over to Coumadin  Patient has made good strides in therapy.  She was fitted with an AFO  brace but at wheelchair level she is contact-guard to standby assist with all her transfers now.  She is eating poorly and refusing her supplements now.  She had some muscle spasms for which she is taking Tylenol and feels that is adequate.  She does not want to try any muscle relaxers as yet.  She is now refusing her Prilosec and told me she did not want to take it  Nursing on examination have found that she has a breast lump on her left breast I did talk to her about it and offered an examination.  She feels uncomfortable discussing this matter she says the chronic lump and refusing examination  Very emotional and crying today .has refused antidepressants.  Past Medical History:   Diagnosis Date     Acute right MCA stroke      Anorexia      Cranial neuropathies, multiple      Depressive disorder      Hemiplegia affecting left nondominant side       HTN (hypertension)      Hypokalemia      Hypovitaminosis D      Iliotibial band syndrome      Impaired mobility and activities of daily living      Influenza B      Urinary incontinence      No past surgical history on file.  No family history on file.  Social History     Social History     Marital status:      Spouse name: N/A     Number of children: N/A     Years of education: N/A     Occupational History     Not on file.     Social History Main Topics     Smoking status: Never Smoker     Smokeless tobacco: Not on file     Alcohol use Yes      Comment: occasional     Drug use: Not on file     Sexual activity: Not on file     Other Topics Concern     Not on file     Social History Narrative     Current Outpatient Prescriptions   Medication Sig Dispense Refill     acetaminophen (TYLENOL) 325 MG tablet Take 650 mg by mouth every 4 (four) hours as needed for pain.       amLODIPine (NORVASC) 10 MG tablet Take 10 mg by mouth daily.       coenzyme Q10 100 mg capsule Take 100 mg by mouth daily.       ergocalciferol (ERGOCALCIFEROL) 50,000 unit capsule Take 50,000 Units by mouth once a week. Every Sun.       lidocaine (LIDODERM) 5 % Place 1 patch on the skin daily.       mirtazapine (REMERON) 30 MG tablet Take 30 mg by mouth at bedtime.       ondansetron (ZOFRAN) 4 MG tablet Take 4 mg by mouth every 6 (six) hours as needed for nausea.       senna-docusate (PERICOLACE) 8.6-50 mg tablet Take 1 tablet by mouth every 12 (twelve) hours as needed.       senna-docusate (SENNOSIDES-DOCUSATE SODIUM) 8.6-50 mg tablet Take 1 tablet by mouth 2 (two) times a day.       sertraline (ZOLOFT) 25 MG tablet Take 25 mg by mouth at bedtime.       WARFARIN SODIUM (WARFARIN ORAL) Take by mouth. 5/1/17 INR 4.6. Hold 5/1. Next INR 5/2  4/27/17 INR 3.94. On call said give 4.5mg. 4/28/17 Hold X2 then 4mg daily. Next INR 5/1 4/24/17 5mg daily recheck INR 4/25/17 4/19/17 5mg daily       No current facility-administered medications for this  visit.      No Known Allergies    Review of Systems:    Constitutional: Negative.  Negative for fever, chills, has activity change, appetite change and fatigue.   HENT: Negative for congestion and facial swelling.    Eyes: Negative for photophobia, redness and visual disturbance.   Respiratory: Negative for cough and chest tightness.    Cardiovascular: Negative for chest pain, palpitations and leg swelling.   Gastrointestinal: Negative for nausea, diarrhea, constipation, blood in stool and abdominal distention.   Per nursing she is not having regular BMs.  She was choking and gagging on food and nothing tastes good to her as per her  Genitourinary: Negative.    Musculoskeletal: Negative.  Weak with increased muscle spasms reported by patient on the paralyzed side   Skin: Negative.    Neurological: Negative for dizziness, tremors, syncope, weakness, light-headedness and headaches.   Hematological: Does not bruise/bleed easily.   Psychiatric/Behavioral: Negative.  crying and she is emotionally very labile    Vitals:    05/11/17 1358   BP: 130/81   Pulse: 63   Temp: 97  F (36.1  C)       Physical Exam:    GENERAL: no acute distress. Cooperative in conversation.   HEENT: pupils are equal, round and reactive. Oral mucosa is moist and intact.  RESP:Chest symmetric. Regular respiratory rate. No stridor.  CVS: S1S2  ABD: Nondistended, soft.  EXTREMITIES: No lower extremity edema. LEFT SIDED WEAKNESS WITH NEGLECT AND DROOP ; LEFT SIDED VISUAL FIELD CUT AND NEGLECT NOTED  NEURO: non focal. Alert and oriented x3.   PSYCH: within normal limits. No depression or anxiety.  SKIN: warm dry intact     Labs:  INR 2.2  Recent Results (from the past 240 hour(s))   Basic Metabolic Panel   Result Value Ref Range    Sodium 140 136 - 145 mmol/L    Potassium 3.9 3.5 - 5.0 mmol/L    Chloride 108 (H) 98 - 107 mmol/L    CO2 26 22 - 31 mmol/L    Anion Gap, Calculation 6 5 - 18 mmol/L    Glucose 92 70 - 125 mg/dL    Calcium 8.8 8.5 - 10.5 mg/dL     BUN 26 8 - 28 mg/dL    Creatinine 1.51 (H) 0.60 - 1.10 mg/dL    GFR MDRD Af Amer 41 (L) >60 mL/min/1.73m2    GFR MDRD Non Af Amer 34 (L) >60 mL/min/1.73m2   Magnesium   Result Value Ref Range    Magnesium 2.3 1.8 - 2.6 mg/dL   HM2(CBC w/o Differential)   Result Value Ref Range    WBC 6.8 4.0 - 11.0 thou/uL    RBC 3.67 (L) 3.80 - 5.40 mill/uL    Hemoglobin 11.2 (L) 12.0 - 16.0 g/dL    Hematocrit 35.4 35.0 - 47.0 %    MCV 97 80 - 100 fL    MCH 30.5 27.0 - 34.0 pg    MCHC 31.6 (L) 32.0 - 36.0 g/dL    RDW 13.8 11.0 - 14.5 %    Platelets 297 140 - 440 thou/uL    MPV 12.0 8.5 - 12.5 fL   Urinalysis   Result Value Ref Range    Color, UA Yellow Colorless, Yellow, Straw, Light Yellow    Clarity, UA Clear Clear    Glucose, UA Negative Negative    Bilirubin, UA Negative Negative    Ketones, UA Negative Negative    Specific Gravity, UA 1.016 1.001 - 1.030    Blood, UA Negative Negative    pH, UA 5.0 4.5 - 8.0    Protein, UA Trace (!) Negative mg/dL    Urobilinogen, UA <2.0 E.U./dL <2.0 E.U./dL, 2.0 E.U./dL    Nitrite, UA Negative Negative    Leukocytes, UA Large (!) Negative    Bacteria, UA Few (!) None Seen hpf    RBC, UA 0-2 None Seen, 0-2 hpf    WBC, UA 0-5 None Seen, 0-5 hpf    Squam Epithel, UA 10-25 (!) None Seen, 0-5 lpf    Mucus, UA Few (!) None Seen lpf     Assessment/Plan:   Acute right MCA embolic stroke on 3/21/17 status post TPA at [facility]  thrombectomy was attempted but was unsuccessful  Her course in the hospital was complicated by hemorrhagic conversion     Left sided hemiparesis- patient his here after doing either extensive rehabilitation at [facility] and will continue with gentle rehabilitation and speech therapies in the TCU setting  . she is doing much better and now is a contact-guard to standby assist with her transfers and pretty much independent with most of her ADLs now.has a AFO now .     Severe loss of appetite- refuses Remeron .     Mood disorder with emotional  lability -refused meds;crying since yest.     Atrial fibrillation felt to be most likely the probable cause off her recent CVA though she is in sinus rhythm currently she was on aspirin and now has been switched over to Coumadin will monitor INR      Incontinence of bladder and bowel she will be on time voiding with therapy     Hypertension continue with her blood pressure medications for now. monitor blood pressures.  Due to recent poor intake with nausea and emesis blood pressures have been low so will give hold parameters for her amlodipine     Cognitive impairment associated with significant left-sided neglect and weakness felt to be a result of vascular dementia associated with his CVA  speech will be working with her families hoping for improvement.SLUMS21/30     Debilitation continue with her PT OT and rehab she has had a huge improvement recently      Vitamin D deficiency she's on high dozes of supplementation continue to monitor.r/ levels in 4 weeks    muscle spasms-try tynelol as pt refused any muscle relaxer; PT using diathermy and E stem.refuses med often   tinetti 3/28 and SLUMS 21/30  She is making good progress with therapy and was reassured.  Continue to monitor mood a psych eval is still pending    Electronically signed by: BENNY Bertrand  This progress note was completed using Dragon software and there may be grammatical errors.

## 2021-06-10 NOTE — PROGRESS NOTES
[facility]      Code Status:  FULL CODE  Visit Type: Review Of Multiple Medical Conditions     Facility:  [facility] [[id number]]           History of Present Illness: Marcia Kelley is a 76 y.o. female who is readmitted to the TCU from hospital with influenza.  She was examined in the presence of her  who supplemented her history. This is a 76-year-old with past medical history significant only for hypertension and who was completely independent with her ADLs using no assist device who presented to the hospital after a fall.  She was admitted with acute right MCA territory stroke associated with left-sided weakness facial groups letting of speech confusion and collapse. She was given IV TPA and from back to me was attempted but was unsuccessful course was complicated by hemorrhage.  Her blood pressures were stable however she had evidence of atrial allocation suggestive of a fib and she was initially started on aspirin and then switched over to Coumadin  she has had significant depression associated lability of mood and poor intake.   and wife both indicate the goal is that she be ambulatory and the goal is to discharge her home  While in the TCU patient developed a fever of around 100 Fahrenheit and workup was initiated including labs her white count was normal.  However she became hypotensive and her influenza was positive she was sent to the hospital where she was noted to have a UTI and started on antibiotics.  She has been discharged back to the TCU .  She has lost greater than 10 pounds since her last admission due to her poor appetite but refuses to take any medications/supplements, but now eating better.wts are stable as per staff however she is not doing well not eating and choking and gagging on food she threw up last night.  Blood pressures remain soft     Now complaining of muscle spasms but does not want to try anything -did agree to try tylenol this  was scheduled for her but as per staff she frequently will refuse medication  PT is using Estem and diathermy.  She has impaired creatinine I suspect there is some degree of dehydration but she absolutely refuses to have any IV fluids given to her  Her  was updated about her care plan also  Past Medical History:   Diagnosis Date     Acute right MCA stroke      Anorexia      Cranial neuropathies, multiple      Depressive disorder      Hemiplegia affecting left nondominant side      HTN (hypertension)      Hypokalemia      Hypovitaminosis D      Iliotibial band syndrome      Impaired mobility and activities of daily living      Influenza B      Urinary incontinence      No past surgical history on file.  No family history on file.  Social History     Social History     Marital status:      Spouse name: N/A     Number of children: N/A     Years of education: N/A     Occupational History     Not on file.     Social History Main Topics     Smoking status: Never Smoker     Smokeless tobacco: Not on file     Alcohol use Yes      Comment: occasional     Drug use: Not on file     Sexual activity: Not on file     Other Topics Concern     Not on file     Social History Narrative     Current Outpatient Prescriptions   Medication Sig Dispense Refill     acetaminophen (TYLENOL) 325 MG tablet Take 650 mg by mouth every 4 (four) hours as needed for pain.       amLODIPine (NORVASC) 10 MG tablet Take 10 mg by mouth daily.       coenzyme Q10 100 mg capsule Take 100 mg by mouth daily.       ergocalciferol (ERGOCALCIFEROL) 50,000 unit capsule Take 50,000 Units by mouth once a week. Every Sun.       lidocaine (LIDODERM) 5 % Place 1 patch on the skin daily.       mirtazapine (REMERON) 30 MG tablet Take 30 mg by mouth at bedtime.       ondansetron (ZOFRAN) 4 MG tablet Take 4 mg by mouth every 6 (six) hours as needed for nausea.       senna-docusate (PERICOLACE) 8.6-50 mg tablet Take 1 tablet by mouth every 12 (twelve) hours  as needed.       senna-docusate (SENNOSIDES-DOCUSATE SODIUM) 8.6-50 mg tablet Take 1 tablet by mouth 2 (two) times a day.       sertraline (ZOLOFT) 25 MG tablet Take 25 mg by mouth at bedtime.       WARFARIN SODIUM (WARFARIN ORAL) Take by mouth. 5/1/17 INR 4.6. Hold 5/1. Next INR 5/2  4/27/17 INR 3.94. On call said give 4.5mg. 4/28/17 Hold X2 then 4mg daily. Next INR 5/1 4/24/17 5mg daily recheck INR 4/25/17 4/19/17 5mg daily       No current facility-administered medications for this visit.      No Known Allergies      Review of Systems:    Constitutional: Negative.  Negative for fever, chills, has activity change, appetite change and fatigue.   HENT: Negative for congestion and facial swelling.    Eyes: Negative for photophobia, redness and visual disturbance.   Respiratory: Negative for cough and chest tightness.    Cardiovascular: Negative for chest pain, palpitations and leg swelling.   Gastrointestinal: Negative for nausea, diarrhea, constipation, blood in stool and abdominal distention.   Per nursing she is not having regular BMs.  She was choking and gagging on food and nothing tastes good to her as per her  Genitourinary: Negative.    Musculoskeletal: Negative.  Weak with increased muscle spasms reported by patient on the paralyzed side   Skin: Negative.    Neurological: Negative for dizziness, tremors, syncope, weakness, light-headedness and headaches.   Hematological: Does not bruise/bleed easily.   Psychiatric/Behavioral: Negative.      Vitals:    05/04/17 1452   BP: 108/68   Pulse: 73   Resp: 17   Temp: 98  F (36.7  C)       Physical Exam:    GENERAL: no acute distress. Cooperative in conversation.   HEENT: pupils are equal, round and reactive. Oral mucosa is moist and intact.  RESP:Chest symmetric. Regular respiratory rate. No stridor.  CVS: S1S2  ABD: Nondistended, soft.  EXTREMITIES: No lower extremity edema. LEFT SIDED WEAKNESS WITH NEGLECT AND DROOP ; LEFT SIDED VISUAL FIELD CUT AND NEGLECT  NOTED  NEURO: non focal. Alert and oriented x3.   PSYCH: within normal limits. No depression or anxiety.  SKIN: warm dry intact     Labs:  INR 2.2  Recent Results (from the past 240 hour(s))   INR   Result Value Ref Range    INR 3.94 (H) 0.90 - 1.10   Basic Metabolic Panel   Result Value Ref Range    Sodium 140 136 - 145 mmol/L    Potassium 3.9 3.5 - 5.0 mmol/L    Chloride 108 (H) 98 - 107 mmol/L    CO2 26 22 - 31 mmol/L    Anion Gap, Calculation 6 5 - 18 mmol/L    Glucose 92 70 - 125 mg/dL    Calcium 8.8 8.5 - 10.5 mg/dL    BUN 26 8 - 28 mg/dL    Creatinine 1.51 (H) 0.60 - 1.10 mg/dL    GFR MDRD Af Amer 41 (L) >60 mL/min/1.73m2    GFR MDRD Non Af Amer 34 (L) >60 mL/min/1.73m2   Magnesium   Result Value Ref Range    Magnesium 2.3 1.8 - 2.6 mg/dL   HM2(CBC w/o Differential)   Result Value Ref Range    WBC 6.8 4.0 - 11.0 thou/uL    RBC 3.67 (L) 3.80 - 5.40 mill/uL    Hemoglobin 11.2 (L) 12.0 - 16.0 g/dL    Hematocrit 35.4 35.0 - 47.0 %    MCV 97 80 - 100 fL    MCH 30.5 27.0 - 34.0 pg    MCHC 31.6 (L) 32.0 - 36.0 g/dL    RDW 13.8 11.0 - 14.5 %    Platelets 297 140 - 440 thou/uL    MPV 12.0 8.5 - 12.5 fL   Urinalysis   Result Value Ref Range    Color, UA Yellow Colorless, Yellow, Straw, Light Yellow    Clarity, UA Clear Clear    Glucose, UA Negative Negative    Bilirubin, UA Negative Negative    Ketones, UA Negative Negative    Specific Gravity, UA 1.016 1.001 - 1.030    Blood, UA Negative Negative    pH, UA 5.0 4.5 - 8.0    Protein, UA Trace (!) Negative mg/dL    Urobilinogen, UA <2.0 E.U./dL <2.0 E.U./dL, 2.0 E.U./dL    Nitrite, UA Negative Negative    Leukocytes, UA Large (!) Negative    Bacteria, UA Few (!) None Seen hpf    RBC, UA 0-2 None Seen, 0-2 hpf    WBC, UA 0-5 None Seen, 0-5 hpf    Squam Epithel, UA 10-25 (!) None Seen, 0-5 lpf    Mucus, UA Few (!) None Seen lpf     Assessment/Plan:   Acute Influenza-given Tamiflu;afebrile; nl WBC.resolved    Acute right MCA embolic stroke on 3/21/17 status post TPA at  [facility]  thrombectomy was attempted but was unsuccessful  Her course in the hospital was complicated by hemorrhagic conversion     Left sided hemiparesis- patient his here [facility] and will continue with gentle rehabilitation and speech therapies in the TCU setting  she remains a two person assist to for transfers but families goal is to make her independent  Unfortunately she has become weaker since her last episode and has been downgraded from one person to 2 person assist currently     Severe loss of appetite- refuses Remeron .     Mood disorder with emotional lability she's on a starting those of Zoloft along with Remeron and was seen by psychiatriy the hospital  continue to monitor mood and behaviors closely . Patient flatly refused to have any adjustment made in her medications.  She has refused to sign the consents to take either of the 2 medications.  I am going to order psychology eval for her which is pending.     Atrial fibrillation felt to be most likely the probable cause off her recent CVA though she is in sinus rhythm currently she was on aspirin and now has been switched over to Coumadin will monitor INR      Incontinence of bladder and bowel she will be on time voiding with therapy     Hypertension continue with her blood pressure medications for now. monitor blood pressures.  Due to recent poor intake with nausea and emesis blood pressures have been low so will give hold parameters for her amlodipine     Cognitive impairment associated with significant left-sided neglect and weakness felt to be a result of vascular dementia associated with his CVA  speech will be working with her families hoping for improvement     Debilitation continue with her PT OT and rehab     Vitamin D deficiency she's on high dozes of supplementation continue to monitor.r/ levels in 4 weeks    muscle spasms-try tynelol as pt refused any muscle  relaxer; PT using diathermy and E stem.refuses med often   tinetti 0/28 and SLUMS 21/30  Labs show renal impairment with a worsening creatinine in addition with her nausea vomiting and choking and gagging.  I have offered to give her some IV fluids to see if that helps her overall and her symptom management   was updated about this separately also in her presence  She flatly refused both times to even entertain the thought and told me she will try to eat better  If she does not stop choking and gagging and requesting her to see GI speech will again reevaluate her tomorrow  Total time spent was 35 minutes, more than half of it was in face-to-face counseling regarding disease state, treatment, side effects, documentation, review of clinical data and coordination of care    Electronically signed by: BENNY Bertrand  This progress note was completed using Dragon software and there may be grammatical errors.

## 2021-06-10 NOTE — PROGRESS NOTES
[facility]      Code Status:  FULL CODE  Visit Type: H & P     Facility:  [facility] [[id number]]           History of Present Illness: Marcia Kelley is a 76 y.o. female Who is currently admitted in the nursing home as a transfer from a rehab facility.  She was examined in the presence of her  who supplemented her history. This is a 76-year-old with past medical history significant only for hypertension and who was completely independent with her ADLs using no assist device who presented to the hospital after a fall.  She was admitted with acute right MCA territory stroke associated with left-sided weakness facial groups letting of speech confusion and collapse. She was given IV TPA and from back to me was attempted but was unsuccessful course was complicated by hemorrhage.  Her blood pressures were stable however she had evidence of atrial allocation suggestive of a fib and she was initially started on aspirin and then switched over to Coumadin  she has had significant depression associated lability of mood and poor intake. She was seen by psychiatric and has been started on medications but continues to have a poor appetite and crying during exam   and wife both indicate the goal is that she be ambulatory and the goal is to discharge her home    Past Medical History:   Diagnosis Date     Acute right MCA stroke      Anorexia      Cranial neuropathies, multiple      Depressive disorder      Hemiplegia affecting left nondominant side      HTN (hypertension)      Hypokalemia      Hypovitaminosis D      Iliotibial band syndrome      Impaired mobility and activities of daily living      Urinary incontinence      No past surgical history on file.  No family history on file.  Social History     Social History     Marital status:      Spouse name: N/A     Number of children: N/A     Years of education: N/A     Occupational History     Not on file.     Social  History Main Topics     Smoking status: Never Smoker     Smokeless tobacco: Not on file     Alcohol use Not on file     Drug use: Not on file     Sexual activity: Not on file     Other Topics Concern     Not on file     Social History Narrative     No narrative on file     Current Outpatient Prescriptions   Medication Sig Dispense Refill     amLODIPine (NORVASC) 10 MG tablet Take 10 mg by mouth daily.       coenzyme Q10 100 mg capsule Take 100 mg by mouth daily.       [START ON 4/23/2017] ergocalciferol (ERGOCALCIFEROL) 50,000 unit capsule Take 50,000 Units by mouth once a week. Every Sun.       lidocaine (LIDODERM) 5 % Place 1 patch on the skin daily.       mirtazapine (REMERON) 30 MG tablet Take 30 mg by mouth at bedtime.       senna-docusate (PERICOLACE) 8.6-50 mg tablet Take 1 tablet by mouth every 12 (twelve) hours as needed.       sertraline (ZOLOFT) 25 MG tablet Take 25 mg by mouth at bedtime.       WARFARIN SODIUM (WARFARIN ORAL) Take by mouth. 4/19/17 5mg daily       No current facility-administered medications for this visit.      No Known Allergies      Review of Systems:    Constitutional: Negative.  Negative for fever, chills, has activity change, appetite change and fatigue.   HENT: Negative for congestion and facial swelling.    Eyes: Negative for photophobia, redness and visual disturbance.   Respiratory: Negative for cough and chest tightness.    Cardiovascular: Negative for chest pain, palpitations and leg swelling.   Gastrointestinal: Negative for nausea, diarrhea, constipation, blood in stool and abdominal distention.   Genitourinary: Negative.    Musculoskeletal: Negative.weak    Skin: Negative.    Neurological: Negative for dizziness, tremors, syncope, weakness, light-headedness and headaches.   Hematological: Does not bruise/bleed easily.   Psychiatric/Behavioral: Negative.  tearful    Vitals:    04/20/17 1557   BP: 101/65   Pulse: 79   Resp: 18   Temp: 99  F (37.2  C)       Physical Exam:     GENERAL: no acute distress. Cooperative in conversation.   HEENT: pupils are equal, round and reactive. Oral mucosa is moist and intact.  RESP:Chest symmetric. Regular respiratory rate. No stridor.  CVS: S1S2  ABD: Nondistended, soft.  EXTREMITIES: No lower extremity edema. LEFT SIDED WEAKNESS WITH NEGLECT AND DROOP ; LEFT SIDED VISUAL FIELD CUT AND NEGLECT NOTED  NEURO: non focal. Alert and oriented x3.   PSYCH: within normal limits. No depression or anxiety.TEARFUL  SKIN: warm dry intact       Labs:    Recent Results (from the past 240 hour(s))   Basic Metabolic Panel   Result Value Ref Range    Sodium 139 136 - 145 mmol/L    Potassium 3.8 3.5 - 5.0 mmol/L    Chloride 104 98 - 107 mmol/L    CO2 26 22 - 31 mmol/L    Anion Gap, Calculation 9 5 - 18 mmol/L    Glucose 135 (H) 70 - 125 mg/dL    Calcium 8.4 (L) 8.5 - 10.5 mg/dL    BUN 20 8 - 28 mg/dL    Creatinine 0.90 0.60 - 1.10 mg/dL    GFR MDRD Af Amer >60 >60 mL/min/1.73m2    GFR MDRD Non Af Amer >60 >60 mL/min/1.73m2   Magnesium   Result Value Ref Range    Magnesium 1.7 (L) 1.8 - 2.6 mg/dL   HM2(CBC w/o Differential)   Result Value Ref Range    WBC 4.6 4.0 - 11.0 thou/uL    RBC 4.08 3.80 - 5.40 mill/uL    Hemoglobin 12.5 12.0 - 16.0 g/dL    Hematocrit 39.9 35.0 - 47.0 %    MCV 98 80 - 100 fL    MCH 30.6 27.0 - 34.0 pg    MCHC 31.3 (L) 32.0 - 36.0 g/dL    RDW 13.7 11.0 - 14.5 %    Platelets 221 140 - 440 thou/uL    MPV 11.7 8.5 - 12.5 fL     No results found.  MRI of the brain showed moderate to large acute ischemic right posterior division middle cerebral artery territory infarct with some petechial hemorrhage and difficult to exclude macroscopic hemorrhage  small old bilateral cerebellar infarcts    CT had showed involving large area of infarction right hemisphere with no hemorrhage and moderate mass effect with midline shift      Assessment/Plan:    Acute right MCA embolic stroke on 3/21/17 status post TPA at [facility]  from ectomy was attempted but  was unsuccessful  Her course in the hospital was complicated by hemorrhagic conversion    Left sided hemiparesis- patient his here [facility] and will continue with gentle rehabilitation and speech therapies in the TCU setting  she remains a two person assist to for transfers but families goal is to make her independent    Severe loss of appetite- currently on Remeron with not much improvement does not want to improve increase the dose anymore  plan is to start her on ensure 2 times a day    Mood disorder with emotional lability she's on a starting those of Zoloft along with Remeron and was seen by psychiatric the hospital  she was crying and still is having adjustment reaction to her recent diagnoses  continue to monitor mood and behaviors closely . Patient norma refused to have any adjustment made in her medications    Atrial fibrillation felt to be most likely the probable cause off her recent CVA though she is in sinus rhythm currently she was on aspirin and now has been switched over to Coumadin will monitor INR's is scheduled    Incontinence of bladder and bowel she will be on time voiding with therapy    Hypertension continue with her blood pressure medications for now monitor blood pressures    Cognitive impairment associated with significant left-sided neglect and weakness felt to be a result of vascular dementia associated with his CVA  speech will be working with her families hoping for improvement    Debilitation continue with her PT OT and rehab    Vitamin D deficiency she's on high dozes of supplementation continue to monitor    Low-grade fever- she appears to be Stable at 's request would like to check I UA you see as well as baseline labs  she just had her IVC filter removed and he's wondering if she got infected but her site appears to be real healing well    Total time spent was 45 minutes, more than half of it was in  face-to-face counseling regarding disease state, treatment, side effects, documentation, review of clinical data and coordination of care  Electronically signed by: BENNY Bertrand  This progress note was completed using Dragon software and there may be grammatical errors.

## 2021-06-10 NOTE — PROGRESS NOTES
[facility]      Code Status:  FULL CODE  Visit Type: Review Of Multiple Medical Conditions     Facility:  [facility] [[id number]]           History of Present Illness: Marcia Kelley is a 76 y.o. female who is readmitted to the TCU from hospital with influenza.  She was examined in the presence of her  who supplemented her history. This is a 76-year-old with past medical history significant only for hypertension and who was completely independent with her ADLs using no assist device who presented to the hospital after a fall.  She was admitted with acute right MCA territory stroke associated with left-sided weakness facial groups letting of speech confusion and collapse. She was given IV TPA and from back to me was attempted but was unsuccessful course was complicated by hemorrhage.  Her blood pressures were stable however she had evidence of atrial allocation suggestive of a fib and she was initially started on aspirin and then switched over to Coumadin  Patient has made good strides in therapy.  She was fitted with an AFO  brace but at wheelchair level she is contact-guard to standby assist with all her transfers now.  She is eating poorly and refusing her supplements now.  She had some muscle spasms for which she is taking Tylenol and feels that is adequate.  She does not want to try any muscle relaxers as yet.  Her mood is much improved today and she is actually not as tearful in affect.  She was walking using a hemiwalker and her KAFO in place and that did quite wonderfully.  Off note she did sign her Zoloft consent and started the medication about a week ago  She was seen ambulating using a hemiwalker  Past Medical History:   Diagnosis Date     Acute right MCA stroke      Anorexia      Cranial neuropathies, multiple      Depressive disorder      Hemiplegia affecting left nondominant side      HTN (hypertension)      Hypokalemia      Hypovitaminosis D       Iliotibial band syndrome      Impaired mobility and activities of daily living      Influenza B      Urinary incontinence      No past surgical history on file.  No family history on file.  Social History     Social History     Marital status:      Spouse name: N/A     Number of children: N/A     Years of education: N/A     Occupational History     Not on file.     Social History Main Topics     Smoking status: Never Smoker     Smokeless tobacco: Not on file     Alcohol use Yes      Comment: occasional     Drug use: Not on file     Sexual activity: Not on file     Other Topics Concern     Not on file     Social History Narrative     Current Outpatient Prescriptions   Medication Sig Dispense Refill     acetaminophen (TYLENOL) 325 MG tablet Take 650 mg by mouth every 4 (four) hours as needed for pain.       amLODIPine (NORVASC) 10 MG tablet Take 10 mg by mouth daily.       coenzyme Q10 100 mg capsule Take 100 mg by mouth daily.       ergocalciferol (ERGOCALCIFEROL) 50,000 unit capsule Take 50,000 Units by mouth once a week. Every Sun.       lidocaine (LIDODERM) 5 % Place 1 patch on the skin daily.       mirtazapine (REMERON) 30 MG tablet Take 30 mg by mouth at bedtime.       ondansetron (ZOFRAN) 4 MG tablet Take 4 mg by mouth every 6 (six) hours as needed for nausea.       senna-docusate (PERICOLACE) 8.6-50 mg tablet Take 1 tablet by mouth every 12 (twelve) hours as needed.       senna-docusate (SENNOSIDES-DOCUSATE SODIUM) 8.6-50 mg tablet Take 1 tablet by mouth 2 (two) times a day.       sertraline (ZOLOFT) 25 MG tablet Take 25 mg by mouth at bedtime.       WARFARIN SODIUM (WARFARIN ORAL) Take by mouth. 5/15/17 INR 2.6. Take 2.5mg daily. Next INR 5/18/17 5/12/17 INR 4.4. Hold. Next INR 5/15/17  5/1/17 INR 4.6. Hold 5/1. Next INR 5/2 4/27/17 INR 3.94. On call said give 4.5mg. 4/28/17 Hold X2 then 4mg daily. Next INR 5/1 4/24/17 5mg daily recheck INR 4/25/17 4/19/17 5mg daily       No current  facility-administered medications for this visit.      No Known Allergies    Review of Systems:    Constitutional: Negative.  Negative for fever, chills, has activity change, appetite change and fatigue.   HENT: Negative for congestion and facial swelling.    Eyes: Negative for photophobia, redness and visual disturbance.   Respiratory: Negative for cough and chest tightness.    Cardiovascular: Negative for chest pain, palpitations and leg swelling.   Gastrointestinal: Negative for nausea, diarrhea, constipation, blood in stool and abdominal distention.   Genitourinary: Negative.    Musculoskeletal: Negative.  Weak with increased muscle spasms reported by patient on the paralyzed side   Skin: Negative.    Neurological: Negative for dizziness, tremors, syncope, weakness, light-headedness and headaches.   Hematological: Does not bruise/bleed easily.   Psychiatric/Behavioral: Negative.      Vitals:    05/18/17 1537   BP: 129/73   Pulse: 65   Temp: 98  F (36.7  C)       Physical Exam:    GENERAL: no acute distress. Cooperative in conversation.   HEENT: pupils are equal, round and reactive. Oral mucosa is moist and intact.  RESP:Chest symmetric. Regular respiratory rate. No stridor.  CVS: S1S2  ABD: Nondistended, soft.  EXTREMITIES: No lower extremity edema. LEFT SIDED WEAKNESS WITH NEGLECT AND DROOP ; LEFT SIDED VISUAL FIELD CUT AND NEGLECT NOTED  NEURO: non focal. Alert and oriented x3.   PSYCH: within normal limits. No depression or anxiety.  SKIN: warm dry intact     Labs:   Results for orders placed or performed in visit on 05/03/17   Basic Metabolic Panel   Result Value Ref Range    Sodium 140 136 - 145 mmol/L    Potassium 3.9 3.5 - 5.0 mmol/L    Chloride 108 (H) 98 - 107 mmol/L    CO2 26 22 - 31 mmol/L    Anion Gap, Calculation 6 5 - 18 mmol/L    Glucose 92 70 - 125 mg/dL    Calcium 8.8 8.5 - 10.5 mg/dL    BUN 26 8 - 28 mg/dL    Creatinine 1.51 (H) 0.60 - 1.10 mg/dL    GFR MDRD Af Amer 41 (L) >60 mL/min/1.73m2     GFR MDRD Non Af Amer 34 (L) >60 mL/min/1.73m2     INR 2.1  Assessment/Plan:   Acute right MCA embolic stroke on 3/21/17 status post TPA at [facility]  thrombectomy was attempted but was unsuccessful  Her course in the hospital was complicated by hemorrhagic conversion     Left sided hemiparesis- patient his here [facility] and will continue with gentle rehabilitation and speech therapies in the TCU setting  . she is doing much better and now is a contact-guard to standby assist with her transfers and pretty much independent with most of her ADLs now.has a KAFO now .ambulating with a sujatha walker today     Severe loss of appetite- refuses Remeron .     Mood disorder with emotional lability -patient did sign her consent for Zoloft and finally has started receiving it with a significant improvement in mood she is very upset today with me because I requested a psychology eval for her  She finally did sign the consent and agreed to see a psychologist     Atrial fibrillation felt to be most likely the probable cause off her recent CVA though she is in sinus rhythm currently she was on aspirin and now has been switched over to Coumadin will monitor INR      Incontinence of bladder and bowel she will be on time voiding with therapy     Hypertension continue with her blood pressure medications for now. monitor blood pressures.       Cognitive impairment associated with significant left-sided neglect and weakness felt to be a result of vascular dementia associated with his CVA  speech will be working with her families hoping for improvement.SLUMS21/30     Debilitation continue with her PT OT and rehab she has had a huge improvement recently      Vitamin D deficiency she's on high dozes of supplementation continue to monitor.    muscle spasms-try tynelol as pt refused any muscle relaxer; PT using diathermy and E stem.refuses med often   tinetti 3/28 on  admission and is now 21/28 and SLUMS 21/30  She is making good progress with therapy and was reassured.  Continue to monitor mood a psych eval is still pending.walking better now and wants to go home soon  I did have another discussion with her and her  who wanted to do a medication review with me.  We did talk about mood and upcoming psychology appointment and I think overall she has done better with Zoloft so we are encouraging her to meet the psychologist  She continues to have difficulty adjusting to the new changes in her life  In addition medication review was done and I am going to discontinue her CoQ10 at her request this is a supplement.  Since she wants to get off her vitamin D supplementation my plan is to check another vitamin D level if it is adequate she can certainly get off her high-dose supplements the rest of all of her meds were deemed essential for her right now.  Several of her medications including Zofran Prilosec Senokot all as needed and I told her unless she asked for them we cannot administer them to her  Total time spent was 35 minutes, more than half of it was in face-to-face counseling regarding disease state, treatment, side effects, documentation, review of clinical data and coordination of care  Electronically signed by: BENNY Bertrand  This progress note was completed using Dragon software and there may be grammatical errors.

## 2021-06-10 NOTE — PROGRESS NOTES
[facility]      Code Status:  FULL CODE  Visit Type: Review Of Multiple Medical Conditions     Facility:  [facility] [[id number]]           History of Present Illness: Marcia Kelley is a 76 y.o. female who is readmitted to the TCU from hospital with influenza.  She was examined in the presence of her  who supplemented her history. This is a 76-year-old with past medical history significant only for hypertension and who was completely independent with her ADLs using no assist device who presented to the hospital after a fall.  She was admitted with acute right MCA territory stroke associated with left-sided weakness facial groups letting of speech confusion and collapse. She was given IV TPA and from back to me was attempted but was unsuccessful course was complicated by hemorrhage.  Her blood pressures were stable however she had evidence of atrial allocation suggestive of a fib and she was initially started on aspirin and then switched over to Coumadin  she has had significant depression associated lability of mood and poor intake.   and wife both indicate the goal is that she be ambulatory and the goal is to discharge her home  While in the TCU patient developed a fever of around 100 Fahrenheit and workup was initiated including labs her white count was normal.  However she became hypotensive and her influenza was positive she was sent to the hospital where she was noted to have a UTI and started on antibiotics.  She has been discharged back to the TCU .  She has lost greater than 10 pounds since her last admission due to her poor appetite but refuses to take any medications/supplements, but now eating better.wts are stable  Blood pressures remain soft     Now complaining of muscle spasms but does not want to try anything -did agree to try tylenol  PT is using Estem and diathermy.has been having low grade temps last night  Past Medical History:   Diagnosis Date      Acute right MCA stroke      Anorexia      Cranial neuropathies, multiple      Depressive disorder      Hemiplegia affecting left nondominant side      HTN (hypertension)      Hypokalemia      Hypovitaminosis D      Iliotibial band syndrome      Impaired mobility and activities of daily living      Influenza B      Urinary incontinence      No past surgical history on file.  No family history on file.  Social History     Social History     Marital status:      Spouse name: N/A     Number of children: N/A     Years of education: N/A     Occupational History     Not on file.     Social History Main Topics     Smoking status: Never Smoker     Smokeless tobacco: Not on file     Alcohol use Yes      Comment: occasional     Drug use: Not on file     Sexual activity: Not on file     Other Topics Concern     Not on file     Social History Narrative     Current Outpatient Prescriptions   Medication Sig Dispense Refill     acetaminophen (TYLENOL) 325 MG tablet Take 650 mg by mouth every 4 (four) hours as needed for pain.       amLODIPine (NORVASC) 10 MG tablet Take 10 mg by mouth daily.       coenzyme Q10 100 mg capsule Take 100 mg by mouth daily.       ergocalciferol (ERGOCALCIFEROL) 50,000 unit capsule Take 50,000 Units by mouth once a week. Every Sun.       lidocaine (LIDODERM) 5 % Place 1 patch on the skin daily.       mirtazapine (REMERON) 30 MG tablet Take 30 mg by mouth at bedtime.       ondansetron (ZOFRAN) 4 MG tablet Take 4 mg by mouth every 6 (six) hours as needed for nausea.       senna-docusate (PERICOLACE) 8.6-50 mg tablet Take 1 tablet by mouth every 12 (twelve) hours as needed.       senna-docusate (SENNOSIDES-DOCUSATE SODIUM) 8.6-50 mg tablet Take 1 tablet by mouth 2 (two) times a day.       sertraline (ZOLOFT) 25 MG tablet Take 25 mg by mouth at bedtime.       WARFARIN SODIUM (WARFARIN ORAL) Take by mouth. 5/1/17 INR 4.6. Hold 5/1. Next INR 5/2 4/27/17 INR 3.94. On call said give 4.5mg. 4/28/17  Hold X2 then 4mg daily. Next INR 5/1 4/24/17 5mg daily recheck INR 4/25/17 4/19/17 5mg daily       No current facility-administered medications for this visit.      No Known Allergies      Review of Systems:    Constitutional: Negative.  Negative for fever, chills, has activity change, appetite change and fatigue.   HENT: Negative for congestion and facial swelling.    Eyes: Negative for photophobia, redness and visual disturbance.   Respiratory: Negative for cough and chest tightness.    Cardiovascular: Negative for chest pain, palpitations and leg swelling.   Gastrointestinal: Negative for nausea, diarrhea, constipation, blood in stool and abdominal distention.   Per nursing she is not having regular BMs.  She was choking and gagging on food and nothing tastes good to her as per her  Genitourinary: Negative.    Musculoskeletal: Negative.  Weak with increased muscle spasms reported by patient on the paralyzed side   Skin: Negative.    Neurological: Negative for dizziness, tremors, syncope, weakness, light-headedness and headaches.   Hematological: Does not bruise/bleed easily.   Psychiatric/Behavioral: Negative.      Vitals:    05/02/17 1024   BP: 107/68   Pulse: 67   Temp: 98  F (36.7  C)       Physical Exam:    GENERAL: no acute distress. Cooperative in conversation.   HEENT: pupils are equal, round and reactive. Oral mucosa is moist and intact.  RESP:Chest symmetric. Regular respiratory rate. No stridor.  CVS: S1S2  ABD: Nondistended, soft.  EXTREMITIES: No lower extremity edema. LEFT SIDED WEAKNESS WITH NEGLECT AND DROOP ; LEFT SIDED VISUAL FIELD CUT AND NEGLECT NOTED  NEURO: non focal. Alert and oriented x3.   PSYCH: within normal limits. No depression or anxiety.  SKIN: warm dry intact     Labs:  INR 4.1  Recent Results (from the past 240 hour(s))   INR   Result Value Ref Range    INR 3.94 (H) 0.90 - 1.10     Assessment/Plan:   Acute Influenza-given Tamiflu;afebrile; nl WBC    Acute right MCA embolic stroke  on 3/21/17 status post TPA at [facility]  thrombectomy was attempted but was unsuccessful  Her course in the hospital was complicated by hemorrhagic conversion     Left sided hemiparesis- patient his here [facility] and will continue with gentle rehabilitation and speech therapies in the TCU setting  she remains a two person assist to for transfers but families goal is to make her independent  Unfortunately she has become weaker since her last episode and has been downgraded from one person to 2 person assist currently     Severe loss of appetite- refuses Remeron .     Mood disorder with emotional lability she's on a starting those of Zoloft along with Remeron and was seen by psychiatriy the hospital  continue to monitor mood and behaviors closely . Patient flatly refused to have any adjustment made in her medications.  She has refused to sign the consents to take either of the 2 medications.  I am going to order psychology eval for her     Atrial fibrillation felt to be most likely the probable cause off her recent CVA though she is in sinus rhythm currently she was on aspirin and now has been switched over to Coumadin will monitor INR -high now so being held for now     Incontinence of bladder and bowel she will be on time voiding with therapy     Hypertension continue with her blood pressure medications for now. monitor blood pressures.  Due to recent poor intake with nausea and emesis blood pressures have been low so will give hold parameters for her amlodipine     Cognitive impairment associated with significant left-sided neglect and weakness felt to be a result of vascular dementia associated with his CVA  speech will be working with her families hoping for improvement     Debilitation continue with her PT OT and rehab     Vitamin D deficiency she's on high dozes of supplementation continue to monitor.r/ levels in 4 weeks    muscle  spasms-try tynelol as pt refused any muscle relaxer; PT using diathermy and E stem    Due to low grade fevers check routine labs. R/c UA . F/u on labs and pain management . updated .wts to be monitored tinetti 0/28 and SLUMS 21/30  Total time spent was 35 minutes, more than half of it was in face-to-face counseling regarding disease state, treatment, side effects, documentation, review of clinical data and coordination of care    Electronically signed by: BENNY Bertrand  This progress note was completed using Dragon software and there may be grammatical errors.

## 2021-06-10 NOTE — PROGRESS NOTES
[facility]      Code Status:  FULL CODE  Visit Type: Review Of Multiple Medical Conditions     Facility:  [facility] [[id number]]           History of Present Illness: Marcia Kelley is a 76 y.o. female who is readmitted to the TCU from hospital with influenza.  She was examined in the presence of her  who supplemented her history. This is a 76-year-old with past medical history significant only for hypertension and who was completely independent with her ADLs using no assist device who presented to the hospital after a fall.  She was admitted with acute right MCA territory stroke associated with left-sided weakness facial groups letting of speech confusion and collapse. She was given IV TPA and from back to me was attempted but was unsuccessful course was complicated by hemorrhage.  Her blood pressures were stable however she had evidence of atrial allocation suggestive of a fib and she was initially started on aspirin and then switched over to Coumadin  Patient has made good strides in therapy.  She was fitted with an AFO in the knee brace but at wheelchair level she is contact-guard to standby assist with all her transfers now.  She is eating poorly and refusing her supplements now.  She had some muscle spasms for which she is taking Tylenol and feels that is adequate.  She does not want to try any muscle relaxers as yet.  She is now refusing her Prilosec and told me she did not want to take it  Nursing on examination have found that she has a breast lump on her left breast I did talk to her about it and offered an examination.  She feels uncomfortable discussing this matter she says the chronic lump and refusing examination  I did discuss this issue with both her in the presence of her  and nursing.  We have again offered to examine or offer further treatments for this lump if it is of any concern patient would like to defer this exam for now  Past Medical  History:   Diagnosis Date     Acute right MCA stroke      Anorexia      Cranial neuropathies, multiple      Depressive disorder      Hemiplegia affecting left nondominant side      HTN (hypertension)      Hypokalemia      Hypovitaminosis D      Iliotibial band syndrome      Impaired mobility and activities of daily living      Influenza B      Urinary incontinence      No past surgical history on file.  No family history on file.  Social History     Social History     Marital status:      Spouse name: N/A     Number of children: N/A     Years of education: N/A     Occupational History     Not on file.     Social History Main Topics     Smoking status: Never Smoker     Smokeless tobacco: Not on file     Alcohol use Yes      Comment: occasional     Drug use: Not on file     Sexual activity: Not on file     Other Topics Concern     Not on file     Social History Narrative     Current Outpatient Prescriptions   Medication Sig Dispense Refill     acetaminophen (TYLENOL) 325 MG tablet Take 650 mg by mouth every 4 (four) hours as needed for pain.       amLODIPine (NORVASC) 10 MG tablet Take 10 mg by mouth daily.       coenzyme Q10 100 mg capsule Take 100 mg by mouth daily.       ergocalciferol (ERGOCALCIFEROL) 50,000 unit capsule Take 50,000 Units by mouth once a week. Every Sun.       lidocaine (LIDODERM) 5 % Place 1 patch on the skin daily.       mirtazapine (REMERON) 30 MG tablet Take 30 mg by mouth at bedtime.       ondansetron (ZOFRAN) 4 MG tablet Take 4 mg by mouth every 6 (six) hours as needed for nausea.       senna-docusate (PERICOLACE) 8.6-50 mg tablet Take 1 tablet by mouth every 12 (twelve) hours as needed.       senna-docusate (SENNOSIDES-DOCUSATE SODIUM) 8.6-50 mg tablet Take 1 tablet by mouth 2 (two) times a day.       sertraline (ZOLOFT) 25 MG tablet Take 25 mg by mouth at bedtime.       WARFARIN SODIUM (WARFARIN ORAL) Take by mouth. 5/1/17 INR 4.6. Hold 5/1. Next INR 5/2 4/27/17 INR 3.94. On call  said give 4.5mg. 4/28/17 Hold X2 then 4mg daily. Next INR 5/1 4/24/17 5mg daily recheck INR 4/25/17 4/19/17 5mg daily       No current facility-administered medications for this visit.      No Known Allergies    Review of Systems:    Constitutional: Negative.  Negative for fever, chills, has activity change, appetite change and fatigue.   HENT: Negative for congestion and facial swelling.    Eyes: Negative for photophobia, redness and visual disturbance.   Respiratory: Negative for cough and chest tightness.    Cardiovascular: Negative for chest pain, palpitations and leg swelling.   Gastrointestinal: Negative for nausea, diarrhea, constipation, blood in stool and abdominal distention.   Per nursing she is not having regular BMs.  She was choking and gagging on food and nothing tastes good to her as per her  Genitourinary: Negative.    Musculoskeletal: Negative.  Weak with increased muscle spasms reported by patient on the paralyzed side   Skin: Negative.    Neurological: Negative for dizziness, tremors, syncope, weakness, light-headedness and headaches.   Hematological: Does not bruise/bleed easily.   Psychiatric/Behavioral: Negative.      Vitals:    05/09/17 1040   BP: 108/73   Pulse: (!) 59   Temp: 97  F (36.1  C)       Physical Exam:    GENERAL: no acute distress. Cooperative in conversation.   HEENT: pupils are equal, round and reactive. Oral mucosa is moist and intact.  RESP:Chest symmetric. Regular respiratory rate. No stridor.  CVS: S1S2  ABD: Nondistended, soft.  EXTREMITIES: No lower extremity edema. LEFT SIDED WEAKNESS WITH NEGLECT AND DROOP ; LEFT SIDED VISUAL FIELD CUT AND NEGLECT NOTED  NEURO: non focal. Alert and oriented x3.   PSYCH: within normal limits. No depression or anxiety.  SKIN: warm dry intact     Labs:  INR 2.2  Recent Results (from the past 240 hour(s))   Basic Metabolic Panel   Result Value Ref Range    Sodium 140 136 - 145 mmol/L    Potassium 3.9 3.5 - 5.0 mmol/L    Chloride 108 (H)  98 - 107 mmol/L    CO2 26 22 - 31 mmol/L    Anion Gap, Calculation 6 5 - 18 mmol/L    Glucose 92 70 - 125 mg/dL    Calcium 8.8 8.5 - 10.5 mg/dL    BUN 26 8 - 28 mg/dL    Creatinine 1.51 (H) 0.60 - 1.10 mg/dL    GFR MDRD Af Amer 41 (L) >60 mL/min/1.73m2    GFR MDRD Non Af Amer 34 (L) >60 mL/min/1.73m2   Magnesium   Result Value Ref Range    Magnesium 2.3 1.8 - 2.6 mg/dL   HM2(CBC w/o Differential)   Result Value Ref Range    WBC 6.8 4.0 - 11.0 thou/uL    RBC 3.67 (L) 3.80 - 5.40 mill/uL    Hemoglobin 11.2 (L) 12.0 - 16.0 g/dL    Hematocrit 35.4 35.0 - 47.0 %    MCV 97 80 - 100 fL    MCH 30.5 27.0 - 34.0 pg    MCHC 31.6 (L) 32.0 - 36.0 g/dL    RDW 13.8 11.0 - 14.5 %    Platelets 297 140 - 440 thou/uL    MPV 12.0 8.5 - 12.5 fL   Urinalysis   Result Value Ref Range    Color, UA Yellow Colorless, Yellow, Straw, Light Yellow    Clarity, UA Clear Clear    Glucose, UA Negative Negative    Bilirubin, UA Negative Negative    Ketones, UA Negative Negative    Specific Gravity, UA 1.016 1.001 - 1.030    Blood, UA Negative Negative    pH, UA 5.0 4.5 - 8.0    Protein, UA Trace (!) Negative mg/dL    Urobilinogen, UA <2.0 E.U./dL <2.0 E.U./dL, 2.0 E.U./dL    Nitrite, UA Negative Negative    Leukocytes, UA Large (!) Negative    Bacteria, UA Few (!) None Seen hpf    RBC, UA 0-2 None Seen, 0-2 hpf    WBC, UA 0-5 None Seen, 0-5 hpf    Squam Epithel, UA 10-25 (!) None Seen, 0-5 lpf    Mucus, UA Few (!) None Seen lpf     Assessment/Plan:   Acute Influenza-given Tamiflu;afebrile; nl WBC.resolved    Acute right MCA embolic stroke on 3/21/17 status post TPA at [facility]  thrombectomy was attempted but was unsuccessful  Her course in the hospital was complicated by hemorrhagic conversion     Left sided hemiparesis- patient his [facility] and will continue with gentle rehabilitation and speech therapies in the TCU setting  . she is doing much better and now is a  contact-guard to standby assist with her transfers and pretty much independent with most of her ADLs now     Severe loss of appetite- refuses Remeron .     Mood disorder with emotional lability she's on a starting those of Zoloft along with Remeron and was seen by psychiatriy the hospital  continue to monitor mood and behaviors closely . Patient flatly refused to have any adjustment made in her medications.  She has refused to sign the consents to take either of the 2 medications.  I am going to order psychology eval for her which is pending.     Atrial fibrillation felt to be most likely the probable cause off her recent CVA though she is in sinus rhythm currently she was on aspirin and now has been switched over to Coumadin will monitor INR      Incontinence of bladder and bowel she will be on time voiding with therapy     Hypertension continue with her blood pressure medications for now. monitor blood pressures.  Due to recent poor intake with nausea and emesis blood pressures have been low so will give hold parameters for her amlodipine     Cognitive impairment associated with significant left-sided neglect and weakness felt to be a result of vascular dementia associated with his CVA  speech will be working with her families hoping for improvement.SLUMS21/30     Debilitation continue with her PT OT and rehab she has had a huge improvement recently      Vitamin D deficiency she's on high dozes of supplementation continue to monitor.r/ levels in 4 weeks    muscle spasms-try tynelol as pt refused any muscle relaxer; PT using diathermy and E stem.refuses med often   tinetti 3/28 and SLUMS 21/30  Based on my discussion with her and her  today we will change her Prilosec to as needed.  I again talked to them about her muscle cramps and spasms and she is comfortable just trying Tylenol.  If she wants to try any muscle relaxers versus any other modality including Botox she can talk to her neurologist.  Patient  was counseled regarding subjective concern raised by nursing regarding a breast lump on her left breast.  She has not had any recent mammogram and an examination was offered to her she absolutely refuses to and feels uncomfortable so I am going to do for it however risk and benefits were explained to the patient as well as her  at her bedside  Total time spent was 35 minutes, more than half of it was in face-to-face counseling regarding disease state, treatment, side effects, documentation, review of clinical data and coordination of care    Electronically signed by: BENNY Bertrand  This progress note was completed using Dragon software and there may be grammatical errors.

## 2021-06-10 NOTE — PROGRESS NOTES
[facility]      Code Status:  FULL CODE  Visit Type: Review Of Multiple Medical Conditions     Facility:  [facility] [[id number]]           History of Present Illness: Marcia Kelley is a 76 y.o. female who is readmitted to the TCU from hospital with h/o of CVA  . This is a 76-year-old with past medical history significant only for hypertension and who was completely independent with her ADLs using no assist device who presented to the hospital after a fall.  She was admitted with acute right MCA territory stroke associated with left-sided weakness , speech difficulty; confusion and collapse. She was given IV TPA and from back to me was attempted but was unsuccessful course was complicated by hemorrhage.  Her blood pressures were stable however she had evidence of atrial allocation suggestive of a fib and she was initially started on aspirin and then switched over to Coumadin.INR 2.5  Patient has made good strides in therapy.  She was fitted with an KAFO  brace but at wheelchair level she is contact-guard to standby assist with all her transfers now..  She had some muscle spasms for which she is taking Tylenol and feels that is adequate.  She does not want to try any muscle relaxers as yet.  Her mood is much improved today and she is actually not as tearful in affect.  She was walking using a hemiwalker and her KAFO in place and that did quite wonderfully.  Off note she did sign her Zoloft consent and started the medication about a week ago but wants to stop her remeron.NO CHANGE IN WTS.   at her bedside wanted to discuss medications and discuss discharge planning. She's not planning to go home this week  Past Medical History:   Diagnosis Date     Acute right MCA stroke      Anorexia      Cranial neuropathies, multiple      Depressive disorder      Hemiplegia affecting left nondominant side      HTN (hypertension)      Hypokalemia      Hypovitaminosis D      Iliotibial  band syndrome      Impaired mobility and activities of daily living      Influenza B      Urinary incontinence      No past surgical history on file.  No family history on file.  Social History     Social History     Marital status:      Spouse name: N/A     Number of children: N/A     Years of education: N/A     Occupational History     Not on file.     Social History Main Topics     Smoking status: Never Smoker     Smokeless tobacco: Not on file     Alcohol use Yes      Comment: occasional     Drug use: Not on file     Sexual activity: Not on file     Other Topics Concern     Not on file     Social History Narrative     Current Outpatient Prescriptions   Medication Sig Dispense Refill     acetaminophen (TYLENOL) 325 MG tablet Take 650 mg by mouth every 4 (four) hours as needed for pain.       amLODIPine (NORVASC) 10 MG tablet Take 10 mg by mouth daily.       coenzyme Q10 100 mg capsule Take 100 mg by mouth daily.       lidocaine (LIDODERM) 5 % Place 1 patch on the skin daily.       mirtazapine (REMERON) 30 MG tablet Take 30 mg by mouth at bedtime.       ondansetron (ZOFRAN) 4 MG tablet Take 4 mg by mouth every 6 (six) hours as needed for nausea.       senna-docusate (PERICOLACE) 8.6-50 mg tablet Take 1 tablet by mouth every 12 (twelve) hours as needed.       senna-docusate (SENNOSIDES-DOCUSATE SODIUM) 8.6-50 mg tablet Take 1 tablet by mouth 2 (two) times a day.       sertraline (ZOLOFT) 25 MG tablet Take 25 mg by mouth at bedtime.       WARFARIN SODIUM (WARFARIN ORAL) Take by mouth. 5/15/17 INR 2.6. Take 2.5mg daily. Next INR 5/18/17 5/12/17 INR 4.4. Hold. Next INR 5/15/17  5/1/17 INR 4.6. Hold 5/1. Next INR 5/2 4/27/17 INR 3.94. On call said give 4.5mg. 4/28/17 Hold X2 then 4mg daily. Next INR 5/1 4/24/17 5mg daily recheck INR 4/25/17 4/19/17 5mg daily       No current facility-administered medications for this visit.      No Known Allergies    Review of Systems:    Constitutional: Negative.  Negative  for fever, chills, has activity change, appetite change and fatigue.   HENT: Negative for congestion and facial swelling.    Eyes: Negative for photophobia, redness and visual disturbance.   Respiratory: Negative for cough and chest tightness.    Cardiovascular: Negative for chest pain, palpitations and leg swelling.   Gastrointestinal: Negative for nausea, diarrhea, constipation, blood in stool and abdominal distention.   Genitourinary: Negative.    Musculoskeletal: Negative.  Weak with increased muscle spasms reported by patient on the paralyzed side   Skin: Negative.    Neurological: Negative for dizziness, tremors, syncope, weakness, light-headedness and headaches.   Hematological: Does not bruise/bleed easily.   Psychiatric/Behavioral: Negative.      Vitals:    05/30/17 1021   BP: 144/77   Pulse: 78   Temp: 97  F (36.1  C)       Physical Exam:    GENERAL: no acute distress. Cooperative in conversation.   HEENT: pupils are equal, round and reactive. Oral mucosa is moist and intact.  RESP:Chest symmetric. Regular respiratory rate. No stridor.  CVS: S1S2  ABD: Nondistended, soft.  EXTREMITIES: No lower extremity edema. LEFT SIDED WEAKNESS WITH NEGLECT AND DROOP ; LEFT SIDED VISUAL FIELD CUT AND NEGLECT NOTED.KAFO NOTED  NEURO: non focal. Alert and oriented x3.   PSYCH: within normal limits. No depression or anxiety.  SKIN: warm dry intact     Labs:   Vitamin D, Total (25-Hydroxy)   Date Value Ref Range Status   05/23/2017 38.2 30.0 - 80.0 ng/mL Final     Results for orders placed or performed in visit on 05/03/17   Basic Metabolic Panel   Result Value Ref Range    Sodium 140 136 - 145 mmol/L    Potassium 3.9 3.5 - 5.0 mmol/L    Chloride 108 (H) 98 - 107 mmol/L    CO2 26 22 - 31 mmol/L    Anion Gap, Calculation 6 5 - 18 mmol/L    Glucose 92 70 - 125 mg/dL    Calcium 8.8 8.5 - 10.5 mg/dL    BUN 26 8 - 28 mg/dL    Creatinine 1.51 (H) 0.60 - 1.10 mg/dL    GFR MDRD Af Amer 41 (L) >60 mL/min/1.73m2    GFR MDRD Non Af  Amer 34 (L) >60 mL/min/1.73m2     INR 2.5  Assessment/Plan:   Acute right MCA embolic stroke on 3/21/17 status post TPA at [facility]  thrombectomy was attempted but was unsuccessful  Her course in the hospital was complicated by hemorrhagic conversion     Left sided hemiparesis- patient his here [facility] and will continue with gentle rehabilitation and speech therapies in the TCU setting  Patient has made significant progress was seen standing up in her own. She can ambulate quite well using a sujatha walker and hoping to go home this weekend     Severe loss of appetite- refuses Remeron .eating better.After discussing care plan with her I am going to discontinue her Remeron at her request     Mood disorder with emotional lability -on low dose zoloft     Atrial fibrillation felt to be most likely the probable cause off her recent CVA though she is in sinus rhythm currently she was on aspirin and now has been switched over to Coumadin will monitor INR .     Incontinence of bladder and bowel she will be on time voiding with therapy.     Hypertension continue with her blood pressure medications for now. monitor blood pressures.       Cognitive impairment associated with significant left-sided neglect and weakness felt to be a result of vascular dementia associated with his CVA  speech will be working with her families hoping for improvement.SLUMS21/30 CPT 5     Debilitation continue with her PT OT and rehab she has had a huge improvement recently      Vitamin D deficiency NOW on regular replacement dose of 1000 units daily    muscle spasms-try tynelol as pt refused any muscle relaxer; PT using diathermy and E stem.   tinetti 3/28 on admission and is now 21/28 and SLUMS 21/30  She is making good progress with therapy and was reassured.  She was advised to be compliant with a hemiwalker and her KAFO.  NO NEW CHANGE AND PROGRESSING WELL IN PT.Will  discontinue her Remeron at her request  medication review was done both with  and wife- stroke risk prevention strategies were discussed including using of statins. She's not on any currently and can have an outpatient lipid panel drawn again  she will continue with her Coumadin, they would preferably want to come outpatient therapy point discharge and will plan on a home visit prior to discharge  Total time spent was 35 minutes, more than half of it was in face-to-face counseling regarding disease state, treatment, side effects, documentation, review of clinical data and coordination of care  Electronically signed by: BENNY Bertrand  This progress note was completed using Dragon software and there may be grammatical errors.

## 2021-06-10 NOTE — PROGRESS NOTES
[facility]      Code Status:  FULL CODE  Visit Type: Review Of Multiple Medical Conditions     Facility:  [facility] [[id number]]           History of Present Illness: Marcia Kelley is a 76 y.o. female who is readmitted to the TCU from hospital with influenza.  She was examined in the presence of her  who supplemented her history. This is a 76-year-old with past medical history significant only for hypertension and who was completely independent with her ADLs using no assist device who presented to the hospital after a fall.  She was admitted with acute right MCA territory stroke associated with left-sided weakness facial groups letting of speech confusion and collapse. She was given IV TPA and from back to me was attempted but was unsuccessful course was complicated by hemorrhage.  Her blood pressures were stable however she had evidence of atrial allocation suggestive of a fib and she was initially started on aspirin and then switched over to Coumadin  Patient has made good strides in therapy.  She was fitted with an AFO  brace but at wheelchair level she is contact-guard to standby assist with all her transfers now.  She is eating poorly and refusing her supplements now.  She had some muscle spasms for which she is taking Tylenol and feels that is adequate.  She does not want to try any muscle relaxers as yet.  Her mood is much improved today and she is actually not as tearful in affect.  She was walking using a hemiwalker and her KAFO in place and that did quite wonderfully.  Past Medical History:   Diagnosis Date     Acute right MCA stroke      Anorexia      Cranial neuropathies, multiple      Depressive disorder      Hemiplegia affecting left nondominant side      HTN (hypertension)      Hypokalemia      Hypovitaminosis D      Iliotibial band syndrome      Impaired mobility and activities of daily living      Influenza B      Urinary incontinence      No past  surgical history on file.  No family history on file.  Social History     Social History     Marital status:      Spouse name: N/A     Number of children: N/A     Years of education: N/A     Occupational History     Not on file.     Social History Main Topics     Smoking status: Never Smoker     Smokeless tobacco: Not on file     Alcohol use Yes      Comment: occasional     Drug use: Not on file     Sexual activity: Not on file     Other Topics Concern     Not on file     Social History Narrative     Current Outpatient Prescriptions   Medication Sig Dispense Refill     acetaminophen (TYLENOL) 325 MG tablet Take 650 mg by mouth every 4 (four) hours as needed for pain.       amLODIPine (NORVASC) 10 MG tablet Take 10 mg by mouth daily.       coenzyme Q10 100 mg capsule Take 100 mg by mouth daily.       ergocalciferol (ERGOCALCIFEROL) 50,000 unit capsule Take 50,000 Units by mouth once a week. Every Sun.       lidocaine (LIDODERM) 5 % Place 1 patch on the skin daily.       mirtazapine (REMERON) 30 MG tablet Take 30 mg by mouth at bedtime.       ondansetron (ZOFRAN) 4 MG tablet Take 4 mg by mouth every 6 (six) hours as needed for nausea.       senna-docusate (PERICOLACE) 8.6-50 mg tablet Take 1 tablet by mouth every 12 (twelve) hours as needed.       senna-docusate (SENNOSIDES-DOCUSATE SODIUM) 8.6-50 mg tablet Take 1 tablet by mouth 2 (two) times a day.       sertraline (ZOLOFT) 25 MG tablet Take 25 mg by mouth at bedtime.       WARFARIN SODIUM (WARFARIN ORAL) Take by mouth. 5/15/17 INR 2.6. Take 2.5mg daily. Next INR 5/18/17 5/12/17 INR 4.4. Hold. Next INR 5/15/17  5/1/17 INR 4.6. Hold 5/1. Next INR 5/2 4/27/17 INR 3.94. On call said give 4.5mg. 4/28/17 Hold X2 then 4mg daily. Next INR 5/1 4/24/17 5mg daily recheck INR 4/25/17 4/19/17 5mg daily       No current facility-administered medications for this visit.      No Known Allergies    Review of Systems:    Constitutional: Negative.  Negative for fever,  chills, has activity change, appetite change and fatigue.   HENT: Negative for congestion and facial swelling.    Eyes: Negative for photophobia, redness and visual disturbance.   Respiratory: Negative for cough and chest tightness.    Cardiovascular: Negative for chest pain, palpitations and leg swelling.   Gastrointestinal: Negative for nausea, diarrhea, constipation, blood in stool and abdominal distention.   Per nursing she is not having regular BMs.  She was choking and gagging on food and nothing tastes good to her as per her  Genitourinary: Negative.    Musculoskeletal: Negative.  Weak with increased muscle spasms reported by patient on the paralyzed side   Skin: Negative.    Neurological: Negative for dizziness, tremors, syncope, weakness, light-headedness and headaches.   Hematological: Does not bruise/bleed easily.   Psychiatric/Behavioral: Negative.  crying and she is emotionally very labile    Vitals:    05/16/17 1044   BP: 123/79   Pulse: 63   Temp: 98  F (36.7  C)       Physical Exam:    GENERAL: no acute distress. Cooperative in conversation.   HEENT: pupils are equal, round and reactive. Oral mucosa is moist and intact.  RESP:Chest symmetric. Regular respiratory rate. No stridor.  CVS: S1S2  ABD: Nondistended, soft.  EXTREMITIES: No lower extremity edema. LEFT SIDED WEAKNESS WITH NEGLECT AND DROOP ; LEFT SIDED VISUAL FIELD CUT AND NEGLECT NOTED  NEURO: non focal. Alert and oriented x3.   PSYCH: within normal limits. No depression or anxiety.  SKIN: warm dry intact     Labs:     Assessment/Plan:   Acute right MCA embolic stroke on 3/21/17 status post TPA at [facility]  thrombectomy was attempted but was unsuccessful  Her course in the hospital was complicated by hemorrhagic conversion     Left sided hemiparesis- patient his here after doing either extensive rehabilitation at [facility] and will continue with gentle rehabilitation and speech therapies in the TCU setting  . she  is doing much better and now is a contact-guard to standby assist with her transfers and pretty much independent with most of her ADLs now.has a KAFO now .ambulating with a sujatha walker today     Severe loss of appetite- refuses Remeron .     Mood disorder with emotional lability -refused meds;crying since yest.     Atrial fibrillation felt to be most likely the probable cause off her recent CVA though she is in sinus rhythm currently she was on aspirin and now has been switched over to Coumadin will monitor INR      Incontinence of bladder and bowel she will be on time voiding with therapy     Hypertension continue with her blood pressure medications for now. monitor blood pressures.  Due to recent poor intake with nausea and emesis blood pressures have been low so will give hold parameters for her amlodipine     Cognitive impairment associated with significant left-sided neglect and weakness felt to be a result of vascular dementia associated with his CVA  speech will be working with her families hoping for improvement.SLUMS21/30     Debilitation continue with her PT OT and rehab she has had a huge improvement recently      Vitamin D deficiency she's on high dozes of supplementation continue to monitor.r/ levels in 4 weeks    muscle spasms-try tynelol as pt refused any muscle relaxer; PT using diathermy and E stem.refuses med often   tinetti 3/28 on admission and is now 21/28 and SLUMS 21/30  She is making good progress with therapy and was reassured.  Continue to monitor mood a psych eval is still pending.walking better now and wants to go home soon    Electronically signed by: BENNY Bertrand  This progress note was completed using Dragon software and there may be grammatical errors.

## 2021-06-10 NOTE — PROGRESS NOTES
[facility]      Code Status:  FULL CODE  Visit Type: H & P     Facility:  [facility] [[id number]]           History of Present Illness: Marcia Kelley is a 76 y.o. female who is readmitted to the TCU from hospital with influenza.  She was examined in the presence of her  who supplemented her history. This is a 76-year-old with past medical history significant only for hypertension and who was completely independent with her ADLs using no assist device who presented to the hospital after a fall.  She was admitted with acute right MCA territory stroke associated with left-sided weakness facial groups letting of speech confusion and collapse. She was given IV TPA and from back to me was attempted but was unsuccessful course was complicated by hemorrhage.  Her blood pressures were stable however she had evidence of atrial allocation suggestive of a fib and she was initially started on aspirin and then switched over to Coumadin  she has had significant depression associated lability of mood and poor intake. She was seen by psychiatric and has been started on medications but continues to have a poor appetite and crying during exam   and wife both indicate the goal is that she be ambulatory and the goal is to discharge her home  While in the TCU patient developed a fever of around 100 Fahrenheit and workup was initiated including labs her white count was normal.  However she became hypotensive and her influenza was positive she was sent to the hospital where she was noted to have a UTI and started on antibiotics.  She has been discharged back to the TCU and seems to be doing well   Past Medical History:   Diagnosis Date     Acute right MCA stroke      Anorexia      Cranial neuropathies, multiple      Depressive disorder      Hemiplegia affecting left nondominant side      HTN (hypertension)      Hypokalemia      Hypovitaminosis D      Iliotibial band syndrome       Impaired mobility and activities of daily living      Influenza B      Urinary incontinence      No past surgical history on file.  No family history on file.  Social History     Social History     Marital status:      Spouse name: N/A     Number of children: N/A     Years of education: N/A     Occupational History     Not on file.     Social History Main Topics     Smoking status: Never Smoker     Smokeless tobacco: Not on file     Alcohol use Yes      Comment: occasional     Drug use: Not on file     Sexual activity: Not on file     Other Topics Concern     Not on file     Social History Narrative     Current Outpatient Prescriptions   Medication Sig Dispense Refill     amLODIPine (NORVASC) 10 MG tablet Take 10 mg by mouth daily.       ciprofloxacin HCl (CIPRO) 500 MG tablet Take 500 mg by mouth 2 (two) times a day.       coenzyme Q10 100 mg capsule Take 100 mg by mouth daily.       ergocalciferol (ERGOCALCIFEROL) 50,000 unit capsule Take 50,000 Units by mouth once a week. Every Sun.       lidocaine (LIDODERM) 5 % Place 1 patch on the skin daily.       mirtazapine (REMERON) 30 MG tablet Take 30 mg by mouth at bedtime.       oseltamivir (TAMIFLU) 75 MG capsule Take 75 mg by mouth 2 (two) times a day.       senna-docusate (PERICOLACE) 8.6-50 mg tablet Take 1 tablet by mouth every 12 (twelve) hours as needed.       sertraline (ZOLOFT) 25 MG tablet Take 25 mg by mouth at bedtime.       WARFARIN SODIUM (WARFARIN ORAL) Take by mouth. 4/24/17 5mg daily recheck INR 4/25/17 4/19/17 5mg daily       No current facility-administered medications for this visit.      No Known Allergies      Review of Systems:    Constitutional: Negative.  Negative for fever, chills, has activity change, appetite change and fatigue.   HENT: Negative for congestion and facial swelling.    Eyes: Negative for photophobia, redness and visual disturbance.   Respiratory: Negative for cough and chest tightness.    Cardiovascular: Negative for  chest pain, palpitations and leg swelling.   Gastrointestinal: Negative for nausea, diarrhea, constipation, blood in stool and abdominal distention.   Genitourinary: Negative.    Musculoskeletal: Negative.  Weak with increased muscle spasms reported by patient on the paralyzed side   Skin: Negative.    Neurological: Negative for dizziness, tremors, syncope, weakness, light-headedness and headaches.   Hematological: Does not bruise/bleed easily.   Psychiatric/Behavioral: Negative.      Vitals:    04/25/17 1038   BP: 113/66   Pulse: (!) 56   Temp: 98  F (36.7  C)       Physical Exam:    GENERAL: no acute distress. Cooperative in conversation.   HEENT: pupils are equal, round and reactive. Oral mucosa is moist and intact.  RESP:Chest symmetric. Regular respiratory rate. No stridor.  CVS: S1S2  ABD: Nondistended, soft.  EXTREMITIES: No lower extremity edema. LEFT SIDED WEAKNESS WITH NEGLECT AND DROOP ; LEFT SIDED VISUAL FIELD CUT AND NEGLECT NOTED  NEURO: non focal. Alert and oriented x3.   PSYCH: within normal limits. No depression or anxiety.  SKIN: warm dry intact       Labs:  INR 3.0  Recent Results (from the past 240 hour(s))   Basic Metabolic Panel   Result Value Ref Range    Sodium 139 136 - 145 mmol/L    Potassium 3.8 3.5 - 5.0 mmol/L    Chloride 104 98 - 107 mmol/L    CO2 26 22 - 31 mmol/L    Anion Gap, Calculation 9 5 - 18 mmol/L    Glucose 135 (H) 70 - 125 mg/dL    Calcium 8.4 (L) 8.5 - 10.5 mg/dL    BUN 20 8 - 28 mg/dL    Creatinine 0.90 0.60 - 1.10 mg/dL    GFR MDRD Af Amer >60 >60 mL/min/1.73m2    GFR MDRD Non Af Amer >60 >60 mL/min/1.73m2   Magnesium   Result Value Ref Range    Magnesium 1.7 (L) 1.8 - 2.6 mg/dL   HM2(CBC w/o Differential)   Result Value Ref Range    WBC 4.6 4.0 - 11.0 thou/uL    RBC 4.08 3.80 - 5.40 mill/uL    Hemoglobin 12.5 12.0 - 16.0 g/dL    Hematocrit 39.9 35.0 - 47.0 %    MCV 98 80 - 100 fL    MCH 30.6 27.0 - 34.0 pg    MCHC 31.3 (L) 32.0 - 36.0 g/dL    RDW 13.7 11.0 - 14.5 %     Platelets 221 140 - 440 thou/uL    MPV 11.7 8.5 - 12.5 fL   Urinalysis-UC if Indicated   Result Value Ref Range    Color, UA Yellow Colorless, Yellow, Straw, Light Yellow    Clarity, UA Cloudy (!) Clear    Glucose, UA Negative Negative    Bilirubin, UA Negative Negative    Ketones, UA Trace (!) Negative    Specific Gravity, UA 1.024 1.001 - 1.030    Blood, UA Negative Negative    pH, UA 5.5 4.5 - 8.0    Protein, UA 30 mg/dL (!) Negative mg/dL    Urobilinogen, UA 2.0 E.U./dL <2.0 E.U./dL, 2.0 E.U./dL    Nitrite, UA Negative Negative    Leukocytes, UA Large (!) Negative    Bacteria, UA Many (!) None Seen hpf    RBC, UA 0-2 None Seen, 0-2 hpf    WBC, UA 25-50 (!) None Seen, 0-5 hpf    Squam Epithel, UA 25-50 (!) None Seen, 0-5 lpf    Mucus, UA Many (!) None Seen lpf   Culture, Urine   Result Value Ref Range    Culture >100,000 col/ml ESBL producing Escherichia coli (!)        Susceptibility    ESBL producing Escherichia coli -  (no method available)     Amoxicillin + Clavulanate 8/4 Resistant      Ampicillin >16 Resistant      Cefazolin >16 Resistant      Cefepime >16 Resistant      Ceftriaxone >32 Resistant      Ciprofloxacin <=0.5 Sensitive      Gentamicin <=2 Sensitive      Levofloxacin <=1 Sensitive      Meropenem <=0.25 Sensitive      Nitrofurantoin 64 Intermediate      Tetracycline >8 Resistant      Tobramycin <=2 Sensitive      Trimethoprim + Sulfamethoxazole <=0.5 Sensitive          Assessment/Plan:   Acute Influenza-given Tamiflu;afebrile; nl WBC    Acute right MCA embolic stroke on 3/21/17 status post TPA at [facility]  thrombectomy was attempted but was unsuccessful  Her course in the hospital was complicated by hemorrhagic conversion     Left sided hemiparesis- patient his here after doing either extensive rehabilitation at [facility] and will continue with gentle rehabilitation and speech therapies in the TCU setting  she remains a two person assist to for transfers but families  goal is to make her independent     Severe loss of appetite- refuses Remeron .     Mood disorder with emotional lability she's on a starting those of Zoloft along with Remeron and was seen by psychiatriy the hospital  continue to monitor mood and behaviors closely . Patient flatly refused to have any adjustment made in her medications.  She has refused to sign the consents to take either of the 2 medications.  I am going to order psychology eval for her     Atrial fibrillation felt to be most likely the probable cause off her recent CVA though she is in sinus rhythm currently she was on aspirin and now has been switched over to Coumadin will monitor INR'.3 today     Incontinence of bladder and bowel she will be on time voiding with therapy     Hypertension continue with her blood pressure medications for now. monitor blood pressures     Cognitive impairment associated with significant left-sided neglect and weakness felt to be a result of vascular dementia associated with his CVA  speech will be working with her families hoping for improvement     Debilitation continue with her PT OT and rehab     Vitamin D deficiency she's on high dozes of supplementation continue to monitor.r/ levels in 4 weeks    UTI-cultures grew ESBL E. coli and she is currently on ciprofloxacin    Iliotibial band syndrome with history of muscle spasms.  In the hospital Lidoderm patch was prescribed she refused that we did talk about various options including muscle relaxers versus trying some Botox injections versus splinting  They will decide what they want to do she has not been refusing most of her medications.  Total time spent was 45 minutes, more than half of it was in face-to-face counseling regarding disease state, treatment, side effects, documentation, review of clinical data and coordination of care    Electronically signed by: BENNY Bertrand    This progress note was completed using Dragon software and there may be grammatical  errors.

## 2021-06-11 NOTE — PROGRESS NOTES
Office Visit - New Patient   Marcia Kelley   77 y.o.  female    Date of visit: 6/20/2017  Physician: Khurram Saldana MD     Assessment and Plan   1. Ischemic stroke  Concerning for cardial embolus.  She is currently on warfarin for DVT and if she has atrial fibrillation would benefit from long-term anticoagulation.  I would like to obtain event monitor and tentatively scheduled this for 30 days given no A. fib found on a 12 day monitor in the ICU.  I would like to add atorvastatin to her medical management.  She has excellent blood pressure control with amlodipine.  - Ambulatory referral to Anticoagulation Monitoring  - CARLINE Hook-Up; Future    2. Immunization due  - Pneumococcal conjugate vaccine 13-valent 6wks-17yrs; >50yrs  - Tdap vaccine,  8yo or older,  IM    3. Mood disorder with depressive features due to general medical condition  She has had improvement in emotional lability with sertraline and this will be continued    4. Essential hypertension  Tinea amlodipine    5. Suspected sleep apnea  Concerning for sleep apnea test on her 's description as well as possible A. fib  - Ambulatory referral to Sleep Medicine    6. DVT (deep venous thrombosis)  See above  - Ambulatory referral to Anticoagulation Monitoring  - INR    7. Anemia, unspecified type  Recheck, in the context of acute illness also received TPA  - HM2(CBC w/o Differential)    Return in about 4 weeks (around 7/18/2017) for recheck.     Chief Complaint   Chief Complaint   Patient presents with     [facility]        Patient Profile   Social History     Social History Narrative    , [place].  Retired from business.  Six children, including Malia, DM educator.         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     History of Present Illness   This 77 y.o. old woman comes in to [facility] care.  Her medical history was reviewed, the electronic medical record is updated and it is reflected in this note.  She is with her .  Her history is notable for recent acute right MCA stroke.  She presented after a syncopal event while shopping.  She had left hemiparesis and visual field loss.  She did undergo IV TPA.  Mechanical thrombectomy could not be performed because of a carotid loop.  She was monitored in the ICU for 12 days and there is no evidence of atrial fibrillation.  There is suspicion that her stroke was from cardioembolic disease.  She was treated with aspirin.  She was not started on a statin.  She went to rehab and she has been doing quite well.  She is now at home.  She has home therapy both physical and occupational therapy.  She is walking with a cane.  She has had some improvement with her left hand but it is still not very functional.  He has some vision loss in the left hemisphere especially inferiorly.  She had no flow across the intra-atrial septum.  She is going to see her eye doctor.   reports that she does snore.  Her echocardiogram showed a moderately dilated left atrium and mildly dilated right atrium.  Once at rehab she developed a fever and was found to have bilateral DVTs.  He had an IVC filter placed and was started on warfarin.  The IVC filter was ultimately removed.  She is now on warfarin and her INRs have generally been therapeutic.    Review of Systems: A comprehensive review of systems was negative except as noted.     Medications and Allergies   Current Outpatient Prescriptions   Medication Sig Dispense Refill     amLODIPine (NORVASC) 10 MG tablet Take 1 tablet (10 mg total) by mouth daily. 90 tablet 3     cholecalciferol, vitamin D3, 1,000 unit tablet Take 1,000 Units by  "mouth daily.       sertraline (ZOLOFT) 25 MG tablet Take 1 tablet (25 mg total) by mouth at bedtime. 90 tablet 3     warfarin (COUMADIN) 3 MG tablet Take 1 tablet (3 mg total) by mouth daily. 90 tablet 3     atorvastatin (LIPITOR) 40 MG tablet Take 1 tablet (40 mg total) by mouth daily. 90 tablet 3     No current facility-administered medications for this visit.      No Known Allergies     Family and Social History   Family History   Problem Relation Age of Onset     Heart attack Mother      Diabetes Mother      Heart attack Father      COPD Brother      Coronary artery disease Brother      Retinal detachment Brother      Peripheral vascular disease Brother      Tremor Brother      No Medical Problems Daughter      No Medical Problems Daughter      No Medical Problems Daughter      No Medical Problems Son      No Medical Problems Son      No Medical Problems Son         Social History   Substance Use Topics     Smoking status: Never Smoker     Smokeless tobacco: None     Alcohol use Yes      Comment: occasional        Physical Exam   General Appearance:   No acute distress    /62 (Patient Site: Right Arm, Patient Position: Sitting, Cuff Size: Adult Regular)  Pulse 65  Ht 5' 7\" (1.702 m)  Wt 157 lb (71.2 kg)  SpO2 98%  BMI 24.59 kg/m2    EYES: Eyelids, conjunctiva, and sclera were normal. Pupils were normal. Cornea, iris, and lens were normal bilaterally.  HEAD, EARS, NOSE, MOUTH, AND THROAT: Head and face were normal. Hearing was normal to voice and the ears were normal to external exam. Nose appearance was normal and there was no discharge. Oropharynx was normal.  NECK: Neck appearance was normal. There were no neck masses and the thyroid was not enlarged.  RESPIRATORY: Breathing pattern was normal and the chest moved symmetrically.  Percussion/auscultatory percussion was normal.  Lung sounds were normal and there were no abnormal sounds.  CARDIOVASCULAR: Heart rate and rhythm were normal.  S1 and S2 " were normal and there were no extra sounds or murmurs. Peripheral pulses in arms and legs were normal.  Jugular venous pressure was normal.  There was no peripheral edema.  GASTROINTESTINAL: The abdomen was normal in contour.  Bowel sounds were present.  Percussion detected no organ enlargement or tenderness.  Palpation detected no tenderness, mass, or enlarged organs.   MUSCULOSKELETAL: Skeletal configuration was normal and muscle mass was normal for age. Joint appearance was overall normal.  LYMPHATIC: There were no enlarged nodes.  SKIN/HAIR/NAILS: Skin color was normal.  There were no skin lesions.  Hair and nails were normal.  NEUROLOGIC: The patient was alert and oriented to person, place, time, and circumstance. Speech was normal. Cranial nerves were normal.  His weakness in the left upper extremity.  She has a brace on the left lower extremity.  She is able to stand unassisted.  She has a left visual field cut.  PSYCHIATRIC:  Mood and affect were normal and the patient had normal recent and remote memory. The patient's judgment and insight were normal.       Additional Information   Review and/or order of clinical lab tests: Reviewed  Review and/or order of radiology tests: Reviewed  Review and/or order of medicine tests:  Discussion of test results with performing physician:  Decision to obtain old records and/or obtain history from someone other than the patient:  Review and summarization of old records and/or obtaining history from someone other than the patient and.or discussion of case with another health care provider: Viewed and summarized above  Independent visualization of image, tracing or specimen itself:    Time: total time spent with the patient was 60 minutes of which >50% was spent in counseling and coordination of care     Khurram Saldana MD  Internal Medicine  Contact me at [phone]

## 2021-06-11 NOTE — PROGRESS NOTES
[facility]      Code Status:  FULL CODE  Visit Type: Review Of Multiple Medical Conditions     Facility:  [facility] [[id number]]           History of Present Illness: Marcia Kelley is a 77 y.o. female who is readmitted to the TCU from hospital with h/o of CVA  . This is a 76-year-old with past medical history significant only for hypertension and who was completely independent with her ADLs using no assist device who presented to the hospital after a fall.  She was admitted with acute right MCA territory stroke associated with left-sided weakness , speech difficulty; confusion and collapse. She was given IV TPA and from back to me was attempted but was unsuccessful course was complicated by hemorrhage.  Her blood pressures were stable however she had evidence of atrial allocation suggestive of a fib and she was initially started on aspirin and then switched over to Coumadin.INR 2.5  Patient has made good strides in therapy.  She was fitted with an KAFO  brace but at wheelchair level she is contact-guard to standby assist with all her transfers now..  She had some muscle spasms for which she is taking Tylenol and feels that is adequate.  She does not want to try any muscle relaxers as yet.  Her mood is much improved today and she is actually not as tearful in affect.  She was walking using a hemiwalker and her KAFO in place and that did quite wonderfully.  Off note she did sign her Zoloft consent and started the medication about a week ago but wants to stop her remeron.NO CHANGE IN WTS.  . She's  planning to go home this week doing well and home eval went well  Refusing tylenol now  Past Medical History:   Diagnosis Date     Acute right MCA stroke      Anorexia      Cranial neuropathies, multiple      Depressive disorder      Hemiplegia affecting left nondominant side      HTN (hypertension)      Hypokalemia      Hypovitaminosis D      Iliotibial band syndrome      Impaired  mobility and activities of daily living      Influenza B      Urinary incontinence      No past surgical history on file.  No family history on file.  Social History     Social History     Marital status:      Spouse name: N/A     Number of children: N/A     Years of education: N/A     Occupational History     Not on file.     Social History Main Topics     Smoking status: Never Smoker     Smokeless tobacco: Not on file     Alcohol use Yes      Comment: occasional     Drug use: Not on file     Sexual activity: Not on file     Other Topics Concern     Not on file     Social History Narrative     Current Outpatient Prescriptions   Medication Sig Dispense Refill     acetaminophen (TYLENOL) 325 MG tablet Take 650 mg by mouth 3 (three) times a day. While awake       amLODIPine (NORVASC) 10 MG tablet Take 10 mg by mouth daily.       cholecalciferol, vitamin D3, 1,000 unit tablet Take 1,000 Units by mouth daily.       omeprazole (PRILOSEC) 20 MG capsule Take 20 mg by mouth Daily before breakfast.       ondansetron (ZOFRAN) 4 MG tablet Take 4 mg by mouth every 6 (six) hours as needed for nausea.       senna-docusate (PERICOLACE) 8.6-50 mg tablet Take 1 tablet by mouth every 12 (twelve) hours as needed.       sertraline (ZOLOFT) 25 MG tablet Take 25 mg by mouth at bedtime.       WARFARIN SODIUM (WARFARIN ORAL) Take by mouth. Currently on  2.5 mg. Next INR due 6/8/17    5/15/17 INR 2.6. Take 2.5mg daily. Next INR 5/18/17 5/12/17 INR 4.4. Hold. Next INR 5/15/17  5/1/17 INR 4.6. Hold 5/1. Next INR 5/2 4/27/17 INR 3.94. On call said give 4.5mg. 4/28/17 Hold X2 then 4mg daily. Next INR 5/1 4/24/17 5mg daily recheck INR 4/25/17 4/19/17 5mg daily       No current facility-administered medications for this visit.      No Known Allergies    Review of Systems:    Constitutional: Negative.  Negative for fever, chills, has activity change, appetite change and fatigue.   HENT: Negative for congestion and facial swelling.     Eyes: Negative for photophobia, redness and visual disturbance.   Respiratory: Negative for cough and chest tightness.    Cardiovascular: Negative for chest pain, palpitations and leg swelling.   Gastrointestinal: Negative for nausea, diarrhea, constipation, blood in stool and abdominal distention.   Genitourinary: Negative.    Musculoskeletal: Negative.  Weak with increased muscle spasms reported by patient on the paralyzed side   Skin: Negative.    Neurological: Negative for dizziness, tremors, syncope, weakness, light-headedness and headaches.   Hematological: Does not bruise/bleed easily.   Psychiatric/Behavioral: Negative.      Vitals:    06/06/17 1021   BP: 116/73   Pulse: 65   Temp: 98  F (36.7  C)       Physical Exam:    GENERAL: no acute distress. Cooperative in conversation.   HEENT: pupils are equal, round and reactive. Oral mucosa is moist and intact.  RESP:Chest symmetric. Regular respiratory rate. No stridor.  CVS: S1S2  ABD: Nondistended, soft.  EXTREMITIES: No lower extremity edema. LEFT SIDED WEAKNESS WITH NEGLECT AND DROOP ; LEFT SIDED VISUAL FIELD CUT AND NEGLECT NOTED.KAFO NOTED  NEURO: non focal. Alert and oriented x3.   PSYCH: within normal limits. No depression or anxiety.  SKIN: warm dry intact     Labs:   Vitamin D, Total (25-Hydroxy)   Date Value Ref Range Status   06/01/2017 35.4 30.0 - 80.0 ng/mL Final     Results for orders placed or performed in visit on 05/03/17   Basic Metabolic Panel   Result Value Ref Range    Sodium 140 136 - 145 mmol/L    Potassium 3.9 3.5 - 5.0 mmol/L    Chloride 108 (H) 98 - 107 mmol/L    CO2 26 22 - 31 mmol/L    Anion Gap, Calculation 6 5 - 18 mmol/L    Glucose 92 70 - 125 mg/dL    Calcium 8.8 8.5 - 10.5 mg/dL    BUN 26 8 - 28 mg/dL    Creatinine 1.51 (H) 0.60 - 1.10 mg/dL    GFR MDRD Af Amer 41 (L) >60 mL/min/1.73m2    GFR MDRD Non Af Amer 34 (L) >60 mL/min/1.73m2     INR 2.5  Assessment/Plan:   Acute right MCA embolic stroke on 3/21/17 status post TPA at  [facility]  thrombectomy was attempted but was unsuccessful  Her course in the hospital was complicated by hemorrhagic conversion     Left sided hemiparesis- patient his here after doing either extensive rehabilitation at sister [facility] rehab facility and will continue with gentle rehabilitation and speech therapies in the TCU setting  Patient has made significant progress was seen standing up in her own. She can ambulate quite well using a sujatha walker and hoping to go home this weekend     Mood disorder with emotional lability -on low dose zoloft     Atrial fibrillation felt to be most likely the probable cause off her recent CVA though she is in sinus rhythm currently she was on aspirin and now has been switched over to Coumadin will monitor INR .     Incontinence of bladder and bowel she will be on time voiding with therapy.     Hypertension continue with her blood pressure medications for now. monitor blood pressures.       Cognitive impairment associated with significant left-sided neglect and weakness felt to be a result of vascular dementia associated with his CVA  speech will be working with her families hoping for improvement.SLUMS21/30 CPT 5     Debilitation continue with her PT OT and rehab she has had a huge improvement recently      Vitamin D deficiency NOW on regular replacement dose of 1000 units daily     tinetti 3/28 on admission and is now 21/28 and SLUMS 21/30  She is making good progress with therapy and was reassured.  She was advised to be compliant with a hemiwalker and her KAFO.  NO NEW CHANGE AND PROGRESSING WELL IN PT.going home this week and has a home eval that went well    Electronically signed by: BENNY Bertrand  This progress note was completed using Dragon software and there may be grammatical errors.

## 2021-06-11 NOTE — PROGRESS NOTES
Office Visit - Follow Up   Marcia Kelley   77 y.o. female    Date of Visit: 7/19/2017    Chief Complaint   Patient presents with     Results     holter monitor        Assessment and Plan   1. Paroxysmal atrial fibrillation  Demonstrated on event monitor, we discussed that her anticoagulation is now long-term.  We discussed different anticoagulation options from continuing warfarin to a novel agent.  Given her chronic kidney disease if she is to use novel agent I recommend Eliquis 2.5 mg twice daily.  They were concerned about the lack of reversal agent.  At this time he would like to continue warfarin for stroke prevention but consider switching to Eliquis once a reversal agent is available.  Because she is asymptomatic during episodes of atrial fibrillation we will continue with a rate control strategy rather than rhythm control.  She will have obstructive sleep apnea evaluation    2. Suspected sleep apnea  3. Ischemic stroke  Secondary to #1  4. DVT (deep venous thrombosis)  Long-term anticoagulation    Return in about 3 months (around 10/19/2017) for recheck.     History of Present Illness   This 77 y.o. old woman comes in with her  for follow-up of her event monitor.  Atrial fibrillation has been demonstrated, this is occurring overnight.  Her  notes significant apneic events at night.  She does not have CPAP but she does have a sleep medicine consultation tomorrow.  Per review, she did have a large growth in the MCV distribution.  No A. fib was documented during her hospitalization but then she developed a DVT and she is now on warfarin.  She has been recovering from her stroke nicely and is now walking with the aid of a walker at times independently.  She is asymptomatic from atrial fibrillation.  She denies palpitations shortness of breath chest pain    Review of Systems: A comprehensive review of systems was negative except as noted.     Medications, Allergies and Problem List   Reviewed  "and updated     Physical Exam   General Appearance:   No acute distress    /68 (Patient Site: Right Arm, Patient Position: Sitting, Cuff Size: Adult Regular)  Pulse (!) 56  Ht 5' 7\" (1.702 m)  Wt 158 lb (71.7 kg)  SpO2 95%  BMI 24.75 kg/m2    HEENT exam is unremarkable neck supple cardiovascular regular rate and rhythm no murmur gallop or rub lungs clear to auscultation bilaterally she walks with the aid of a walker speech is improved      Additional Information   Current Outpatient Prescriptions   Medication Sig Dispense Refill     amLODIPine (NORVASC) 10 MG tablet Take 1 tablet (10 mg total) by mouth daily. 90 tablet 3     atorvastatin (LIPITOR) 40 MG tablet Take 1 tablet (40 mg total) by mouth daily. 90 tablet 3     cholecalciferol, vitamin D3, 1,000 unit tablet Take 1,000 Units by mouth daily.       sertraline (ZOLOFT) 25 MG tablet Take 1 tablet (25 mg total) by mouth at bedtime. 90 tablet 3     warfarin (COUMADIN) 3 MG tablet Take 1 tablet (3 mg total) by mouth daily. (Patient taking differently: Take 3 mg by mouth daily. Take 3mg daily for 6 days and 4.5mg one day a week.) 90 tablet 3     No current facility-administered medications for this visit.      No Known Allergies  Social History   Substance Use Topics     Smoking status: Never Smoker     Smokeless tobacco: None     Alcohol use Yes      Comment: occasional       Review and/or order of clinical lab tests:  Review and/or order of radiology tests:  Review and/or order of medicine tests:  Discussion of test results with performing physician:  Decision to obtain old records and/or obtain history from someone other than the patient:  Review and summarization of old records and/or obtaining history from someone other than the patient and.or discussion of case with another health care provider:  Independent visualization of image, tracing or specimen itself:    Time:      Khurram Saldana MD  "

## 2021-06-11 NOTE — PROGRESS NOTES
[facility]      Code Status:  FULL CODE  Visit Type: Review Of Multiple Medical Conditions     Facility:  [facility] [[id number]]           History of Present Illness: Marcia Kelley is a 76 y.o. female who is readmitted to the TCU from hospital with h/o of CVA  . This is a 76-year-old with past medical history significant only for hypertension and who was completely independent with her ADLs using no assist device who presented to the hospital after a fall.  She was admitted with acute right MCA territory stroke associated with left-sided weakness , speech difficulty; confusion and collapse. She was given IV TPA and from back to me was attempted but was unsuccessful course was complicated by hemorrhage.  Her blood pressures were stable however she had evidence of atrial allocation suggestive of a fib and she was initially started on aspirin and then switched over to Coumadin.INR 2.5  Patient has made good strides in therapy.  She was fitted with an KAFO  brace but at wheelchair level she is contact-guard to standby assist with all her transfers now..  She had some muscle spasms for which she is taking Tylenol and feels that is adequate.  She does not want to try any muscle relaxers as yet.  Her mood is much improved today and she is actually not as tearful in affect.  She was walking using a hemiwalker and her KAFO in place and that did quite wonderfully.  Off note she did sign her Zoloft consent and started the medication about a week ago but wants to stop her remeron.NO CHANGE IN WTS.   at her bedside wanted to discuss medications and discuss discharge planning. She's not planning to go home this week  Past Medical History:   Diagnosis Date     Acute right MCA stroke      Anorexia      Cranial neuropathies, multiple      Depressive disorder      Hemiplegia affecting left nondominant side      HTN (hypertension)      Hypokalemia      Hypovitaminosis D      Iliotibial  band syndrome      Impaired mobility and activities of daily living      Influenza B      Urinary incontinence      No past surgical history on file.  No family history on file.  Social History     Social History     Marital status:      Spouse name: N/A     Number of children: N/A     Years of education: N/A     Occupational History     Not on file.     Social History Main Topics     Smoking status: Never Smoker     Smokeless tobacco: Not on file     Alcohol use Yes      Comment: occasional     Drug use: Not on file     Sexual activity: Not on file     Other Topics Concern     Not on file     Social History Narrative     Current Outpatient Prescriptions   Medication Sig Dispense Refill     acetaminophen (TYLENOL) 325 MG tablet Take 650 mg by mouth every 4 (four) hours as needed for pain.       amLODIPine (NORVASC) 10 MG tablet Take 10 mg by mouth daily.       coenzyme Q10 100 mg capsule Take 100 mg by mouth daily.       lidocaine (LIDODERM) 5 % Place 1 patch on the skin daily.       mirtazapine (REMERON) 30 MG tablet Take 30 mg by mouth at bedtime.       ondansetron (ZOFRAN) 4 MG tablet Take 4 mg by mouth every 6 (six) hours as needed for nausea.       senna-docusate (PERICOLACE) 8.6-50 mg tablet Take 1 tablet by mouth every 12 (twelve) hours as needed.       senna-docusate (SENNOSIDES-DOCUSATE SODIUM) 8.6-50 mg tablet Take 1 tablet by mouth 2 (two) times a day.       sertraline (ZOLOFT) 25 MG tablet Take 25 mg by mouth at bedtime.       WARFARIN SODIUM (WARFARIN ORAL) Take by mouth. 5/15/17 INR 2.6. Take 2.5mg daily. Next INR 5/18/17 5/12/17 INR 4.4. Hold. Next INR 5/15/17  5/1/17 INR 4.6. Hold 5/1. Next INR 5/2 4/27/17 INR 3.94. On call said give 4.5mg. 4/28/17 Hold X2 then 4mg daily. Next INR 5/1 4/24/17 5mg daily recheck INR 4/25/17 4/19/17 5mg daily       No current facility-administered medications for this visit.      No Known Allergies    Review of Systems:    Constitutional: Negative.  Negative  for fever, chills, has activity change, appetite change and fatigue.   HENT: Negative for congestion and facial swelling.    Eyes: Negative for photophobia, redness and visual disturbance.   Respiratory: Negative for cough and chest tightness.    Cardiovascular: Negative for chest pain, palpitations and leg swelling.   Gastrointestinal: Negative for nausea, diarrhea, constipation, blood in stool and abdominal distention.   Genitourinary: Negative.    Musculoskeletal: Negative.  Weak with increased muscle spasms reported by patient on the paralyzed side   Skin: Negative.    Neurological: Negative for dizziness, tremors, syncope, weakness, light-headedness and headaches.   Hematological: Does not bruise/bleed easily.   Psychiatric/Behavioral: Negative.      There were no vitals filed for this visit.    Physical Exam:    GENERAL: no acute distress. Cooperative in conversation.   HEENT: pupils are equal, round and reactive. Oral mucosa is moist and intact.  RESP:Chest symmetric. Regular respiratory rate. No stridor.  CVS: S1S2  ABD: Nondistended, soft.  EXTREMITIES: No lower extremity edema. LEFT SIDED WEAKNESS WITH NEGLECT AND DROOP ; LEFT SIDED VISUAL FIELD CUT AND NEGLECT NOTED.KAFO NOTED  NEURO: non focal. Alert and oriented x3.   PSYCH: within normal limits. No depression or anxiety.  SKIN: warm dry intact     Labs:   Vitamin D, Total (25-Hydroxy)   Date Value Ref Range Status   05/23/2017 38.2 30.0 - 80.0 ng/mL Final     Results for orders placed or performed in visit on 05/03/17   Basic Metabolic Panel   Result Value Ref Range    Sodium 140 136 - 145 mmol/L    Potassium 3.9 3.5 - 5.0 mmol/L    Chloride 108 (H) 98 - 107 mmol/L    CO2 26 22 - 31 mmol/L    Anion Gap, Calculation 6 5 - 18 mmol/L    Glucose 92 70 - 125 mg/dL    Calcium 8.8 8.5 - 10.5 mg/dL    BUN 26 8 - 28 mg/dL    Creatinine 1.51 (H) 0.60 - 1.10 mg/dL    GFR MDRD Af Amer 41 (L) >60 mL/min/1.73m2    GFR MDRD Non Af Amer 34 (L) >60 mL/min/1.73m2      INR 2.5  Assessment/Plan:   Acute right MCA embolic stroke on 3/21/17 status post TPA at [facility]  thrombectomy was attempted but was unsuccessful  Her course in the hospital was complicated by hemorrhagic conversion     Left sided hemiparesis- patient his here after doing either extensive rehabilitation at [facility] rehab facility and will continue with gentle rehabilitation and speech therapies in the TCU setting  Patient has made significant progress was seen standing up in her own. She can ambulate quite well using a sujatha walker and hoping to go home this weekend     Severe loss of appetite- refuses Remeron .eating better.After discussing care plan with her I am going to discontinue her Remeron at her request     Mood disorder with emotional lability -on low dose zoloft     Atrial fibrillation felt to be most likely the probable cause off her recent CVA though she is in sinus rhythm currently she was on aspirin and now has been switched over to Coumadin will monitor INR .     Incontinence of bladder and bowel she will be on time voiding with therapy.     Hypertension continue with her blood pressure medications for now. monitor blood pressures.       Cognitive impairment associated with significant left-sided neglect and weakness felt to be a result of vascular dementia associated with his CVA  speech will be working with her families hoping for improvement.SLUMS21/30 CPT 5     Debilitation continue with her PT OT and rehab she has had a huge improvement recently      Vitamin D deficiency NOW on regular replacement dose of 1000 units daily    muscle spasms-try tynelol as pt refused any muscle relaxer now refusing med so will make it PRN; PT using diathermy and E stem.   tinetti 3/28 on admission and is now 21/28 and SLUMS 21/30  She is making good progress with therapy and was reassured.  She was advised to be compliant with a hemiwalker and her KAFO.  NO NEW CHANGE AND PROGRESSING WELL IN  PT.    Electronically signed by: BENNY Bertrand  This progress note was completed using Dragon software and there may be grammatical errors.

## 2021-06-12 NOTE — PROGRESS NOTES
Office Visit - Follow Up   Marcia Kelley   77 y.o. female    Date of Visit: 9/11/2017    Chief Complaint   Patient presents with     Follow-up     abnormal lab,dilantin was elevated at 26.9. she needs an INR today also        Assessment and Plan   1. Complex partial seizures  Was admitted for partial complex partial seizure on 9/2/2017.  Was treated with Dilantin.  Takes Dilantin 50 mg 4 tablets twice a day continued until her discharge and her follow-up with Dr. Saldana on 9/8/2017.  Had Dilantin level check and it was elevated at 26.  Has been cut down her Dilantin to 3 tablets twice a day since then.  Feels lethargic and weak, bit confused but  already started even before and also at that time she was admitted.  For now continue Dilantin 3 tablets twice a day until I get her result of her total Dilantin and free Dilantin level.  She may need to cut it down some more if her Dilantin level is increased.  Discussed this with the patient and her .  - Phenytoin (Dilantin )  - Phenytoin, Free (Dilantin , Free)    2. Paroxysmal atrial fibrillation  Has paroxysmal atrial fibrillation.  On warfarin.  Needs her INR rechecked today.  INR during her last visit was supratherapeutic because our warfarin nurse adjusted her warfarin dose.    3. Fall  Apparently fell this morning and hitting the left side of her head.  Since she is taking warfarin and her fall she needs at least head CT without contrast and will schedule this.   - CT Head Without Contrast; Future    4. Ischemic stroke  Had ischemic scrotal in March of this year causing left-sided hemiparesis more on the left upper extremity than the left lower extremity.  Will start her physical therapy at home.  - INR    Reviewed her hospitalization on on September 2, 2017, labs and workups.  Also reviewed Dr. Saldana notes on September 8, 2017.  Reviewed  labs done during this visit.    Follow up with Dr. Saldana as scheduled in October.  May need to see her sooner if her  Dilantin level is still increase and will need adjustment of her Dilantin dose.     History of Present Illness   This 77 y.o. old female, patient of Dr. Saldana, is found to have increased Dilantin level to 26, (normal is 10 to 20).  Feels weak, bit lethargic and confused.  But all theses symptoms seem to have started even before she was placed on Dilantin.  The symptoms apparently started when she had complex partial seizure on September 2, 2017 and was admitted for 5 days from 9/2/17 to 9/5/17. EEG was negative for seizure activity done during her hospitalization.   Walks with a walker.  Has left hemiparesis more on the left upper extremity than the left lower extremity. Had ischemic stroke in March 2017 and caused her left hemiparesis. Thought her seizure was due to scarring from her stroke.   Apparently  fell this morning because she tripped on her walker.  Hit the left side of her head on the floor.  No loss of consciousness.  No headaches.  No vomiting.  Feels fine.  But because of her warfarin use she needs at least a head CT.  Needs recheck of her INR because her INR during her last visit was supratherapeutic.  Overall however, she feels fine.    Review of Systems   A 12 point comprehensive review of systems was negative except as noted..     Medications, Allergies and Problem List   Reviewed and updated             Chief Complaint   Follow-up (abnormal lab,dilantin was elevated at 26.9. she needs an INR today also)       Patient Profile   Social History     Social History Narrative    , [place].  Retired from business.  Six children, including Malia, DM educator.         Past Medical History   Patient Active Problem List   Diagnosis     Cerebral infarction involving right middle cerebral artery     Mood disorder with depressive features due to general medical condition     Hypertension     Vitamin D deficiency     DVT (deep venous thrombosis)     Paroxysmal atrial fibrillation     Complex partial  "seizures     High anion gap metabolic acidosis       Past Surgical History  She has a past surgical history that includes Augmentation mammaplasty (Bilateral) and Tonsillectomy.       Medications and Allergies   Current Outpatient Prescriptions   Medication Sig     amLODIPine (NORVASC) 10 MG tablet Take 1 tablet (10 mg total) by mouth daily.     atorvastatin (LIPITOR) 40 MG tablet Take 1 tablet (40 mg total) by mouth daily. (Patient taking differently: Take 40 mg by mouth at bedtime. )     cholecalciferol, vitamin D3, 1,000 unit tablet Take 1,000 Units by mouth daily.     phenytoin (DILANTIN) 50 mg chewable tablet Chew 4 tablets (200 mg total) 2 (two) times a day. (Patient taking differently: Chew 150 mg 2 (two) times a day. )     sertraline (ZOLOFT) 50 MG tablet Take 1 tablet (50 mg total) by mouth at bedtime. (Patient taking differently: Take 100 mg by mouth at bedtime. )     warfarin (COUMADIN) 3 MG tablet Hold warfarin 9/5/2017, further dosing based on INR     No Known Allergies     Family and Social History   Family History   Problem Relation Age of Onset     Heart attack Mother      Diabetes Mother      Heart attack Father      COPD Brother      Coronary artery disease Brother      Retinal detachment Brother      Peripheral vascular disease Brother      Tremor Brother      No Medical Problems Daughter      No Medical Problems Daughter      No Medical Problems Daughter      No Medical Problems Son      No Medical Problems Son      No Medical Problems Son         Social History   Substance Use Topics     Smoking status: Never Smoker     Smokeless tobacco: Never Used     Alcohol use Yes      Comment: occasional        Physical Exam       Physical Exam  /56  Pulse (!) 56  Ht 5' 8\" (1.727 m)  Wt 157 lb (71.2 kg)  SpO2 98%  BMI 23.87 kg/m2  General appearance: alert, appears stated age, cooperative and no distress  Head: Normocephalic, without obvious abnormality, atraumatic  Eyes: conjunctivae/corneas " clear. PERRL, EOM's intact. Fundi benign.  Ears: normal TM's and external ear canals both ears  Nose: Nares normal. Septum midline. Mucosa normal. No drainage or sinus tenderness.  Throat: lips, mucosa, and tongue normal; teeth and gums normal  Neck: no adenopathy, no carotid bruit, no JVD, supple, symmetrical, trachea midline and thyroid not enlarged, symmetric, no tenderness/mass/nodules  Lungs: clear to auscultation bilaterally  Heart: regular rate and rhythm, S1, S2 normal, no murmur, click, rub or gallop  Abdomen: soft, non-tender; bowel sounds normal; no masses,  no organomegaly  Extremities: extremities normal, atraumatic, no cyanosis or edema  Skin: Skin color, texture, turgor normal. No rashes or lesions  Neurologic: Weak left upper and lower extremity more on the left upper extremity, ambulates with a walker.  Otherwise neurologically intact in all other aspects.     Additional Information        Jamaal Guadarrama MD  Internal Medicine  Contact me at [phone]     Additional Information   Current Outpatient Prescriptions   Medication Sig     amLODIPine (NORVASC) 10 MG tablet Take 1 tablet (10 mg total) by mouth daily.     atorvastatin (LIPITOR) 40 MG tablet Take 1 tablet (40 mg total) by mouth daily. (Patient taking differently: Take 40 mg by mouth at bedtime. )     cholecalciferol, vitamin D3, 1,000 unit tablet Take 1,000 Units by mouth daily.     phenytoin (DILANTIN) 50 mg chewable tablet Chew 4 tablets (200 mg total) 2 (two) times a day. (Patient taking differently: Chew 150 mg 2 (two) times a day. )     sertraline (ZOLOFT) 50 MG tablet Take 1 tablet (50 mg total) by mouth at bedtime. (Patient taking differently: Take 100 mg by mouth at bedtime. )     warfarin (COUMADIN) 3 MG tablet Hold warfarin 9/5/2017, further dosing based on INR     No Known Allergies  Social History   Substance Use Topics     Smoking status: Never Smoker     Smokeless tobacco: Never Used     Alcohol use Yes      Comment:  occasional         Time: total time spent with the patient was 40 minutes of which >50% was spent in counseling and coordination of care

## 2021-06-12 NOTE — PROGRESS NOTES
Office Visit - Follow Up   Marcia Kelley   77 y.o. female    Date of Visit: 9/8/2017    Chief Complaint   Patient presents with     Hospital Visit Follow Up        Assessment and Plan   1. Status epilepticus due to complex partial seizure  Continue Dilantin, level was okay in the hospital, family requesting repeat level which is been done.  We will have her see neurology.  - Ambulatory referral to Neurology  - Phenytoin (Dilantin )    2. Paroxysmal atrial fibrillation  Tinea with warfarin for stroke prevention.  Decreasing dose as managed by our [facility] nurses.    3. Essential hypertension  Blood pressure controlled    Regarding her sadness, we discussed this is common after neurological event such as seizure or stroke and I expect he will improve.  I would like her to increase sertraline to 50 mg daily.    Return in about 4 weeks (around 10/6/2017) for recheck.     History of Present Illness   This 77 y.o. old woman comes in for follow-up of recent hospitalization.  She is accompanied by her .  She is admitted with a partial seizure.  This occurred in the context of a right-sided MCA distribution cardio embolic stroke suffered in the past year.  She had recovered nicely from her stroke and was actually walking around her home for the past week or so prior to admission.  After this seizure she had recurrence of her profound right-sided deficits.  She was seen by neurology.  She was put on Dilantin.  Was felt that some of her weakness may be related to Brennon's paralysis.  Overall she is getting better.  She is walking with the aid of a walker now.  She is quite sad and tearful.  She has lost some motivation.  INRs have been checked in increased and her warfarin dose has been decreased.  She is taking sertraline 25 mg daily    Review of Systems: A comprehensive review of systems was negative except as noted.     Medications, Allergies and Problem List   Reviewed and updated     Physical Exam   General  "Appearance:   No acute distress    /74 (Patient Site: Left Arm, Patient Position: Sitting, Cuff Size: Adult Regular)  Pulse (!) 59  Ht 5' 8\" (1.727 m)  Wt 157 lb (71.2 kg)  SpO2 97%  BMI 23.87 kg/m2    She is somewhat tearful.  Speech is normal.  Left-sided weakness.  She is able to stand up on her own although with difficulty.  She walks with the aid of a walker slow and left-sided weakness.  She is able to lift her left leg against gravity and resistance slightly.  She is able to open her left hand and extend her arm.  She has 4-5 flexion strength and 3 out of 5 extension strength.  This is in the left upper extremity.  She is hyperreflexic.     Additional Information   Current Outpatient Prescriptions   Medication Sig Dispense Refill     amLODIPine (NORVASC) 10 MG tablet Take 1 tablet (10 mg total) by mouth daily. 90 tablet 3     atorvastatin (LIPITOR) 40 MG tablet Take 1 tablet (40 mg total) by mouth daily. (Patient taking differently: Take 40 mg by mouth at bedtime. ) 90 tablet 3     cholecalciferol, vitamin D3, 1,000 unit tablet Take 1,000 Units by mouth daily.       phenytoin (DILANTIN) 50 mg chewable tablet Chew 4 tablets (200 mg total) 2 (two) times a day. 60 tablet 1     sertraline (ZOLOFT) 50 MG tablet Take 1 tablet (50 mg total) by mouth at bedtime. 90 tablet 3     warfarin (COUMADIN) 3 MG tablet Hold warfarin 9/5/2017, further dosing based on INR 90 tablet 3     No current facility-administered medications for this visit.      No Known Allergies  Social History   Substance Use Topics     Smoking status: Never Smoker     Smokeless tobacco: Never Used     Alcohol use Yes      Comment: occasional       Review and/or order of clinical lab tests:  Review and/or order of radiology tests:  Review and/or order of medicine tests:  Discussion of test results with performing physician:  Decision to obtain old records and/or obtain history from someone other than the patient:  Review and summarization of " old records and/or obtaining history from someone other than the patient and.or discussion of case with another health care provider:  Independent visualization of image, tracing or specimen itself:    Time: total time spent with the patient was 40 minutes of which >50% was spent in counseling and coordination of care     Khurram Saldana MD

## 2021-06-12 NOTE — PROGRESS NOTES
Admission History & Physical  Marcia Kelley, 1940, [id number]    [facility] Jayy Saldana MD, [phone]    Extended Emergency Contact Information  Primary Emergency Contact: Leah Fleming   [facility]  Home Phone: [phone]  Relation: Child  Secondary Emergency Contact: Jocelyne Ashley   [facility]  Home Phone: [phone]  Relation: Child     Assessment and Plan:   Assessment: Onychomycosis, onychauxis  Plan: Debrided nails 1 through 5 both feet  Active Problems:    * No active hospital problems. *      Chief Complaint:  Long thick nails both feet     HPI:    Marcia Kelley is a 77 y.o. old female who presented to the clinic today complaining of extremely long and thick toenails both feet.  The patient indicated she is unable to treat her nails.  The patient stated that the nails can be somewhat uncomfortable when wearing normal shoes.  She denies any redness, swelling, drainage or bleeding surrounding the nails.  She denies any other previous treatment.  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     Ischemic stroke     Hypertension     Vitamin D deficiency     Past Medical History:   Diagnosis Date     Acute right MCA stroke      Anorexia      Cranial neuropathies, multiple      Depressive disorder      Hemiplegia affecting left nondominant side      HTN (hypertension)      Hypokalemia      Hypovitaminosis D      Iliotibial band syndrome      Impaired mobility and activities of daily living      Influenza B      Urinary incontinence      Patient Active Problem List    Diagnosis Date Noted     Paroxysmal atrial fibrillation 07/19/2017     DVT (deep venous thrombosis) 06/20/2017     Mood disorder with depressive features due to general medical  condition 04/05/2017     Ischemic stroke 03/31/2017     Hypertension 03/31/2017     Vitamin D deficiency 03/31/2017     Surgical History  She  has a past surgical history that includes Augmentation mammaplasty (Bilateral) and Tonsillectomy.   Past Surgical History:   Procedure Laterality Date     AUGMENTATION MAMMAPLASTY Bilateral      TONSILLECTOMY      Social History  Reviewed, and she  reports that she has never smoked. She has never used smokeless tobacco. She reports that she drinks alcohol.  Social History   Substance Use Topics     Smoking status: Never Smoker     Smokeless tobacco: Never Used     Alcohol use Yes      Comment: occasional      Allergies  No Known Allergies Family History  Reviewed, and family history includes COPD in her brother; Coronary artery disease in her brother; Diabetes in her mother; Heart attack in her father and mother; No Medical Problems in her daughter, daughter, daughter, son, son, and son; Peripheral vascular disease in her brother; Retinal detachment in her brother; Tremor in her brother.   Psychosocial Needs  Social History     Social History Narrative    , [employer].  Retired from business.  Six children, including Malia, DM educator.      Additional psychosocial needs reviewed per nursing assessment.       Prior to Admission Medications     (Not in a hospital admission)        Review of Systems - Negative     /68  Pulse (!) 58  Temp 98.3  F (36.8  C) (Temporal)   Resp 14  SpO2 97%    Objective findings: General: The patient is alert and in no acute distress      Integument: Nails bilateral feet are elongated and 3 times normal thickness.   Skin bilateral feet warm and and scaly.        Vascular: DP and PT pulses +1/4  bilateral feet      Neurologic: Negative clonus, negative Babinski bilaterally. .      Musculoskeletal: Range of motion within normal limits bilaterally. Muscle power +4/5 bilaterally in all compartments.      Assessment:  Onychomycosis,  onychauxis    Plan: Debrided nails 1 through 5 both feet. I recommended he return to the clinic every 3 months for continued foot care.

## 2021-06-12 NOTE — PROGRESS NOTES
Dear Dr. Khurram Saldana Md  [address] [place] [address]    Thank you for the opportunity to participate in the care of . Marcia Kelley.    She is a 77 y.o. female who comes to the clinic with a chief complaint of excessive daytime sleepiness that is been going on for approximately 10 years.  The patient's  has noticed that she has been having episodes of witnessed apnea for at least 2-3 years.  He describes her breathing pattern as having pauses in her breathing during sleep for a significant amount of time followed by loud gasping or snoring.  He is extremely concerned about her breathing pattern during sleep and sometimes it would wake him up at night that she is not breathing as well as she should.  The patient also suffered a CVA 5 months ago.  She is in the process of getting worked up for paroxysmal atrial fibrillation.  They would like to rule out obstructive sleep apnea as a causative factor.  The patient's review of systems is significant for weight loss, cough and diarrhea as well as cold intolerance.  Most of these issues have been addressed by other providers in the past.     Past Medical History  Past Medical History:   Diagnosis Date     Acute right MCA stroke      Anorexia      Cranial neuropathies, multiple      Depressive disorder      Hemiplegia affecting left nondominant side      HTN (hypertension)      Hypokalemia      Hypovitaminosis D      Iliotibial band syndrome      Impaired mobility and activities of daily living      Influenza B      Urinary incontinence         Past Surgical History  Past Surgical History:   Procedure Laterality Date     AUGMENTATION MAMMAPLASTY Bilateral      TONSILLECTOMY          Meds  Current Outpatient Prescriptions   Medication Sig Dispense Refill     amLODIPine (NORVASC) 10 MG tablet Take 1 tablet (10 mg total) by mouth daily. 90 tablet 3     atorvastatin (LIPITOR) 40 MG tablet Take 1 tablet (40 mg total) by mouth daily. 90  tablet 3     cholecalciferol, vitamin D3, 1,000 unit tablet Take 1,000 Units by mouth daily.       sertraline (ZOLOFT) 25 MG tablet Take 1 tablet (25 mg total) by mouth at bedtime. 90 tablet 3     warfarin (COUMADIN) 3 MG tablet Take 1 tablet (3 mg total) by mouth daily. (Patient taking differently: Take 3 mg by mouth daily. Take 3mg daily for 6 days and 4.5mg one day a week.) 90 tablet 3     No current facility-administered medications for this visit.         Allergies  Review of patient's allergies indicates no known allergies.     Social History  Social History     Social History     Marital status:      Spouse name: N/A     Number of children: N/A     Years of education: N/A     Occupational History     Not on file.     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         Family History  Family History   Problem Relation Age of Onset     Heart attack Mother      Diabetes Mother      Heart attack Father      COPD Brother      Coronary artery disease Brother      Retinal detachment Brother      Peripheral vascular disease Brother      Tremor Brother      No Medical Problems Daughter      No Medical Problems Daughter      No Medical Problems Daughter      No Medical Problems Son      No Medical Problems Son      No Medical Problems Son      Review of Systems:  Constitutional: Negative except as noted in HPI.   Eyes: Negative except as noted in HPI.   ENT: Negative except as noted in HPI.   Cardiovascular: Negative except as noted in HPI.   Respiratory: Negative except as noted in HPI.   Gastrointestinal: Negative except as noted in HPI.   Genitourinary: Negative except as noted in HPI.  "  Musculoskeletal: Negative except as noted in HPI.   Integumentary: Negative except as noted in HPI.   Neurological: Negative except as noted in HPI.   Psychiatric: Negative except as noted in HPI.   Endocrine: Negative except as noted in HPI.   Hematologic/Lymphatic: Negative except as noted in HPI.      STOP BANG 8/21/2017   Do you snore loudly (louder than talking or loud enough to be heard through closed doors)? 0   Do you often feel tired, fatigued, or sleepy during daytime? 1   Has anyone observed you stop breathing in your sleep? 1   Do you have or are you being treated for high blood pressure? 1   BMI more than 35 kg/m2 0   Age over 50 years old? 1   Neck circumference greater than 16 inches? 0   Gender male? 0   Total Score 4   Epworths Sleepiness Scale 8/21/2017   Sitting and reading 0   Watching TV 2   Sitting, inactive in a public place (e.g. a theatre or a meeting) 0   As a passenger in a car for an hour without a break 1   Lying down to rest in the afternoon when circumstances permit 3   Sitting and talking to someone 0   Sitting quietly after a lunch without alcohol 2   In a car, while stopped for a few minutes in traffic 0   Total score 8   Rooming 8/21/2017   Usual bedtime 9:30-10p   Sleep Latency 10 min   Awakenings 1   Wake Up Time 6:30a   Weekends 6:30a   Energy Drinks 0   Coffee 1 cup   Cola 0   Difficulty falling asleep No   Difficulty staying asleep No   Excessive daytime tiredness No   Excessive daytime sleepiness No   Dozing off while driving No   Shift Worker No   Sleep Walking? No   Sleep Talking? No   Kicking or punching? Yes   Restless legs symptoms Yes       Physical Exam:  BP 98/52  Pulse (!) 51  Ht 5' 7\" (1.702 m)  Wt 157 lb 8 oz (71.4 kg)  SpO2 97% Comment: RA  BMI 24.67 kg/m2  BMI:Body mass index is 24.67 kg/(m^2).   GEN: NAD, appropriate for age  Head: Normocephalic.  EYES: PERRLA, EOMI  ENT: Oropharynx is clear, mallampatti class 3+ airway. Uvula is intact  Nasal mucosa is " "moist without erythema  Neck : Thyroid is within normal limits. Neck cir 13\"  CV: Regular rate and rhythm, S1 & S2 positive.  LUNGS: Bilateral breathsounds heard.   ABDOMEN: Positive bowel sounds in all quadrants, soft, no rebound or guarding  MUSCULOSKELETAL: Bilateral trace leg swelling.  Thoracic kyphosis.  SKIN: warm, dry, no rashes  Neurological: Alert, oriented to time, place, and person.  Psych: normal mood, normal affect     Labs/Studies:     Lab Results   Component Value Date    WBC 5.2 06/20/2017    HGB 12.4 06/20/2017    HCT 37.8 06/20/2017    MCV 91 06/20/2017     06/20/2017         Chemistry        Component Value Date/Time     05/03/2017 1125    K 3.9 05/03/2017 1125     (H) 05/03/2017 1125    CO2 26 05/03/2017 1125    BUN 26 05/03/2017 1125    CREATININE 1.51 (H) 05/03/2017 1125    GLU 92 05/03/2017 1125        Component Value Date/Time    CALCIUM 8.8 05/03/2017 1125            No results found for: FERRITIN  No results found for: TSH      Assessment and Plan:  In summary Marcia Kelley is a 77 y.o. year old female here for sleep disturbance.  1.  Suspected sleep apnea   Mrs. Marcia Kelley has high risk for obstructive sleep apnea based on the history of witnessed apnea, snoring and a crowded airway. I educated the patient on the underlying pathophysiology of obstructive sleep apnea. We reviewed the risks associated with sleep apnea, including increased cardiovascular risk and overall death. We talked about treatments briefly. I recommend getting an split-night nocturnal polysomnography. The patient should return to the clinic to discuss results and treatment option in a patient-centered approach.  Due to the fact that she will have houseguests soon, she hesitates to schedule for the sleep study at this point in time.  She will call us back to schedule for a sleep study when she has a clear idea of their itinerary.  2.  Hypersomnia  3.  Other sleep disturbance  4.  " Snoring      Patient verbalized understanding of these issues, agrees with the plan and all questions were answered today. Patient was given an opportuntity to voice any other symptoms or concerns not listed above. Patient did not have any other symptoms or concerns.      Patient told to return in one week after the sleep study is interpreted. Patient instructed to stop at  to schedule appointment before leaving today.       Anupam Longoria DO  Board Certified in Internal Medicine and Sleep Medicine  [facility].    (Note created with Dragon voice recognition and unintended spelling errors and word substitutions may occur)

## 2021-06-13 NOTE — PROGRESS NOTES
Office Visit - Follow Up   Marcia Kelley   77 y.o. female    Date of Visit: 9/28/2017    Chief Complaint   Patient presents with     Rash     Allergic Reaction     Dilantin and coumadin        Assessment and Plan   1. Ischemic stroke  This was cardioembolic and she will continue with  - INR    2. DVT (deep venous thrombosis)  - INR    3. Partial symptomatic epilepsy with complex partial seizures, not intractable, with status epilepticus  Continue at reduced dose of Dilantin 200 mg in the morning 20 mg in the afternoon and 400 mg at night seems to be doing better.  I was very concerned about her rash and fever in the risk of dress syndrome.  This does not seem to be the case.    4. Skin rash  See above    5. Fever  Resolved    Return in about 4 weeks (around 10/26/2017) for recheck.     History of Present Illness   This 77 y.o. old woman comes in for follow-up of numerous medical problems.  She had a stroke in the past year and subsequently had a seizure.  She is really struggled since her seizure.  She is on Dilantin and has had numerous side effects including sedation.  We have lowered the dose a bit.  I got a call earlier this week that she had a skin rash and was having a fever.  I had recommended that she be seen in the clinic in the main appointment for today.  Today she feels much better.  The rash is very minimal.  Fevers resolved.  Dilantin dosing is gone to 200 mg in the morning 20 mg in the afternoon and 4 mg at night.  This is really made a big difference in her energy level.  She still feels sad at times and is crying spells.  These have lessened with the increase of sertraline 200 mg daily.  She is doing better with her physical therapy.  Overall things are going well.    Review of Systems: A comprehensive review of systems was negative except as noted.     Medications, Allergies and Problem List   Reviewed and updated     Physical Exam   General Appearance:   No acute distress    /60 (Patient  "Site: Right Arm, Patient Position: Sitting, Cuff Size: Adult Regular)  Pulse (!) 56  Temp 97.6  F (36.4  C) (Oral)   Ht 5' 8\" (1.727 m)  Wt 152 lb (68.9 kg)  SpO2 97%  BMI 23.11 kg/m2    She is ambulating with the aid of a walker smiling pleasant rash is very minimal.     Additional Information   Current Outpatient Prescriptions   Medication Sig Dispense Refill     amLODIPine (NORVASC) 10 MG tablet Take 1 tablet (10 mg total) by mouth daily. 90 tablet 3     atorvastatin (LIPITOR) 40 MG tablet Take 1 tablet (40 mg total) by mouth daily. (Patient taking differently: Take 40 mg by mouth at bedtime. ) 90 tablet 3     cholecalciferol, vitamin D3, 1,000 unit tablet Take 1,000 Units by mouth daily.       phenytoin (DILANTIN) 50 mg chewable tablet Chew 4 tablets (200 mg total) 2 (two) times a day. (Patient taking differently: Chew 150 mg 2 (two) times a day. Take 3 tabs in the morning M, W, F, and the other days take 2 in the morning and everyday take 2 tabs in the evening.) 60 tablet 1     sertraline (ZOLOFT) 100 MG tablet Take 1 tablet (100 mg total) by mouth at bedtime. 90 tablet 3     warfarin (COUMADIN) 3 MG tablet Hold warfarin 9/5/2017, further dosing based on INR 90 tablet 3     No current facility-administered medications for this visit.      No Known Allergies  Social History   Substance Use Topics     Smoking status: Never Smoker     Smokeless tobacco: Never Used     Alcohol use Yes      Comment: occasional       Review and/or order of clinical lab tests:  Review and/or order of radiology tests:  Review and/or order of medicine tests:  Discussion of test results with performing physician:  Decision to obtain old records and/or obtain history from someone other than the patient:  Review and summarization of old records and/or obtaining history from someone other than the patient and.or discussion of case with another health care provider:  Independent visualization of image, tracing or specimen " itself:    Time: total time spent with the patient was 25 minutes of which >50% was spent in counseling and coordination of care     Khurram Saldana MD

## 2021-06-13 NOTE — PROGRESS NOTES
"  Office Visit - Follow Up   Marcia Kelley   77 y.o. female    Date of Visit: 9/21/2017    Chief Complaint   Patient presents with     Hypertension        Assessment and Plan   1. Partial symptomatic epilepsy with complex partial seizures, not intractable, with status epilepticus  Her  wants to decrease Dilantin to 4 mg twice a day we will do this.  We discussed risk of decreasing Dilantin including risk of seizure.  He is wondering about switching to another medication and she does have an appointment Dr. Domenic Gentile in about a month and I prefer that he manage this.  - Phenytoin (Dilantin )  - Ambulatory referral to PT/OT    2. DVT (deep venous thrombosis)  Continue warfarin this is for A. fib as well, last INR elevated will recheck  - INR    3. Ischemic stroke  Physical therapy ordered outpatient  - INR    4. Paroxysmal atrial fibrillation    Return in about 4 weeks (around 10/19/2017) for recheck.     History of Present Illness   This 77 y.o. old woman comes in with her  for follow-up of seizures, A. fib and stroke.  She was receiving the hospital with a seizure.  She started on Dilantin and has had no recurrent seizure.  She is really not feeling well.  She has no appetite.  She has no energy.  She has regressed from the improvement she made from her stroke.  She is more forgetful.  She has adjusted her Dilantin down to 4-5 per day and her level is 16.  It had been quite elevated.  She has had no recurrent seizures.  She is walking with the aid of a walker but has not fully regained her function where she was at before her seizure.  He has lost some weight.    Review of Systems: A comprehensive review of systems was negative except as noted.     Medications, Allergies and Problem List   Reviewed and updated     Physical Exam   General Appearance:   No acute distress    /68 (Patient Site: Right Arm, Patient Position: Sitting, Cuff Size: Adult Regular)  Pulse 63  Ht 5' 8\" (1.727 m)  Wt " 152 lb (68.9 kg)  SpO2 95%  BMI 23.11 kg/m2    Patient has left-sided weakness in her arm and leg.  She is able to ambulate.  She is pleasant appropriate.  Normal speech.  Normal alertness.  Abdomen soft.     Additional Information   Current Outpatient Prescriptions   Medication Sig Dispense Refill     amLODIPine (NORVASC) 10 MG tablet Take 1 tablet (10 mg total) by mouth daily. 90 tablet 3     atorvastatin (LIPITOR) 40 MG tablet Take 1 tablet (40 mg total) by mouth daily. (Patient taking differently: Take 40 mg by mouth at bedtime. ) 90 tablet 3     cholecalciferol, vitamin D3, 1,000 unit tablet Take 1,000 Units by mouth daily.       phenytoin (DILANTIN) 50 mg chewable tablet Chew 4 tablets (200 mg total) 2 (two) times a day. (Patient taking differently: Chew 150 mg 2 (two) times a day. Take 3 tabs in the morning M, W, F, and the other days take 2 in the morning and everyday take 2 tabs in the evening.) 60 tablet 1     sertraline (ZOLOFT) 50 MG tablet Take 1 tablet (50 mg total) by mouth at bedtime. (Patient taking differently: Take 100 mg by mouth at bedtime. ) 90 tablet 3     warfarin (COUMADIN) 3 MG tablet Hold warfarin 9/5/2017, further dosing based on INR 90 tablet 3     No current facility-administered medications for this visit.      No Known Allergies  Social History   Substance Use Topics     Smoking status: Never Smoker     Smokeless tobacco: Never Used     Alcohol use Yes      Comment: occasional       Review and/or order of clinical lab tests:  Review and/or order of radiology tests:  Review and/or order of medicine tests:  Discussion of test results with performing physician:  Decision to obtain old records and/or obtain history from someone other than the patient:  Review and summarization of old records and/or obtaining history from someone other than the patient and.or discussion of case with another health care provider:  Independent visualization of image, tracing or specimen itself:    Time:       Khurram Saldana MD

## 2021-06-13 NOTE — PROGRESS NOTES
"  Office Visit - Follow Up   Marcia Kelley   77 y.o. female    Date of Visit: 10/17/2017    Chief Complaint   Patient presents with     Hypertension        Assessment and Plan   1. Complex partial seizures  Continue Dilantin, check level,  particularly curious about this, follow-up with neurology  - Phenytoin (Dilantin )    2. Paroxysmal atrial fibrillation  Rate control strategy with warfarin for stroke prevention    3. 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 Ischemic stroke  Cardioembolic, continue secondary prevention with warfarin, also on atorvastatin and amlodipine  - INR      Return in about 3 months (around 1/17/2018) for recheck.     History of Present Illness   This 77 y.o. old woman comes in with her  for follow-up of numerous medical problems.  She is doing quite well.  No further seizures.  She is on reduced dose of Dilantin 200 mg morning and afternoon and 40 mg at night.  She has been doing physical therapy and is ambulating well, occasionally without a walker.  Left hand strength is improved.  Speech normal.  Mood is much better.  She actually think she is doing better than before she had her seizure.    Review of Systems: A comprehensive review of systems was negative except as noted.     Medications, Allergies and Problem List   Reviewed and updated     Physical Exam   General Appearance:   No acute distress    /68 (Patient Site: Right Arm, Patient Position: Sitting, Cuff Size: Adult Regular)  Pulse (!) 52  Ht 5' 8\" (1.727 m)  Wt 150 lb (68 kg)  SpO2 92%  BMI 22.81 kg/m2    HEENT exam is unremarkable  Neck supple no thyromegaly or nodule palpable  Lymphatic no cervical lymphadenopathy  Cardiovascular regular rate and rhythm no murmur gallop or rub  Pulmonary lungs are clear to auscultation bilaterally  Gastrointestinall abdomen soft nontender nondistended no organomegaly  Neurologic exam demonstrates left arm and " leg weakness, ambulates with the aid of a cane  Psychiatric pleasant, no confusion or agitation        Additional Information   Current Outpatient Prescriptions   Medication Sig Dispense Refill     amLODIPine (NORVASC) 10 MG tablet Take 1 tablet (10 mg total) by mouth daily. 90 tablet 3     atorvastatin (LIPITOR) 40 MG tablet Take 1 tablet (40 mg total) by mouth daily. (Patient taking differently: Take 40 mg by mouth at bedtime. ) 90 tablet 3     cholecalciferol, vitamin D3, 1,000 unit tablet Take 1,000 Units by mouth daily.       phenytoin (DILANTIN) 50 mg chewable tablet CHEW 4 TABLETS TWICE DAILY 771 tablet 3     sertraline (ZOLOFT) 100 MG tablet Take 1 tablet (100 mg total) by mouth at bedtime. 90 tablet 3     warfarin (COUMADIN) 3 MG tablet Hold warfarin 9/5/2017, further dosing based on INR 90 tablet 3     No current facility-administered medications for this visit.      No Known Allergies  Social History   Substance Use Topics     Smoking status: Never Smoker     Smokeless tobacco: Never Used     Alcohol use Yes      Comment: occasional       Review and/or order of clinical lab tests:  Review and/or order of radiology tests:  Review and/or order of medicine tests:  Discussion of test results with performing physician:  Decision to obtain old records and/or obtain history from someone other than the patient:  Review and summarization of old records and/or obtaining history from someone other than the patient and.or discussion of case with another health care provider:  Independent visualization of image, tracing or specimen itself:    Time:      Khurram Saldana MD

## 2021-06-16 NOTE — PROGRESS NOTES
Subjective findings: The patient return to the clinic today complaining of long thick painful nails both feet.  The patient stated she can no longer treat her nails.  She has recently developed a severe tremor of her left upper extremity.    Objective findings:Nails bilateral feet are elongated and 3 times normal thickness.   Skin bilateral feet warm and and scaly.    DP and PT pulses +1/4  bilateral feet  Negative clonus, negative Babinski bilaterally. .  Range of motion within normal limits bilaterally. Muscle power +4/5 bilaterally in all compartments.      Assessment:  Onychomycosis, onychauxis     Plan: Debrided nails 1 through 5 both feet. I recommended he return to the clinic every 3 months for continued foot care.

## 2021-06-17 NOTE — PROGRESS NOTES
[facility] Patient Outreach:     Discharge Date::  5/9/18      Reason for TCU stay (discharge diagnosis)::  Seizure disorder, anxiety      Are you feeling better, the same or worse since your discharge?:  Patient is feeling better          As part of your discharge plan, did they discuss home care with you?: Yes        Have your seen them yet, or are they scheduled to visit?: Yes                Do you have any follow up visits scheduled with your PCP or Specialist?:  Yes, with Specialist      Who are you seeing and when is it scheduled?:  Saw neurologist on 5/7/18      I'm glad to hear you're doing well and we want you to continue to do well. Your PCP would like to see you for a follow-up visit. Can we help set that up for your today?: No (Patient's  will call to set up PCP appt.  )        (RN) Provided patient the PCP's phone number to call if they have any questions or concerns?: Yes

## 2021-06-17 NOTE — PROGRESS NOTES
[facility]      Code Status:  FULL CODE  Visit Type: H & P     Facility:  [facility] [[id number]]           History of Present Illness: Marcia Kelley is a 77 y.o. female who is currently admitted to the TCU as a transfer from the hospital.  . This is a 76-year-old with past medical history significant only for hypertension and who was completely independent with her ADLs using no assist device who presented to the hospitalin 2017  after a fall.  She was admitted with acute right MCA territory stroke associated with left-sided weakness , speech difficulty; confusion and collapse. She was given IV TPA and from back to me was attempted but was unsuccessful course was complicated by hemorrhage.  She also developed atrial fibrillation in the postoperative period.  She was in the TCU for rehab did quite well and was discharged independent with her ADLs to her home  She represented back to the hospital with increased bruising hematuria and a hemoglobin dropped down to 6.6.  She was on chronic anticoagulation with Coumadin due to atrial fibrillation this was discontinued and patient had a prolonged seizure a week prior to presentation.  She had seizure disorder since September 2017 and did not tolerate phenytoin and has been on Keppra.  She has remained on a higher dose of Keppra.  Subsequently she went to the [facility] where EMG was done.  No seizure activity was noted and subsequently her Keppra dosage was reduced  Subsequently she had another seizure week prior to presentation.  She then re-presented to the hospital in the absence of any trauma or seizure with increased bruising hematuria.  She was admitted INR was 2.9 on admission  She was admitted with acute blood loss anemia with renal hemorrhage and breast hematoma.  Her anti-CoAGUlation was reversed and general surgery as well as nephrology both saw her    Past Medical History:   Diagnosis Date     Acute right  MCA stroke      Anorexia      Cranial neuropathies, multiple      Depressive disorder      Hemiplegia affecting left nondominant side      HTN (hypertension)      Hypokalemia      Hypovitaminosis D      Iliotibial band syndrome      Impaired mobility and activities of daily living      Influenza B      Paroxysmal atrial fibrillation      Seizure disorder     pt reports secondary to stroke.     Urinary incontinence      Past Surgical History:   Procedure Laterality Date     AUGMENTATION MAMMAPLASTY Bilateral      TONSILLECTOMY       Family History   Problem Relation Age of Onset     Heart attack Mother      Diabetes Mother      Heart attack Father      COPD Brother      Coronary artery disease Brother      Retinal detachment Brother      Peripheral vascular disease Brother      Tremor Brother      No Medical Problems Daughter      No Medical Problems Daughter      No Medical Problems Daughter      No Medical Problems Son      No Medical Problems Son      No Medical Problems Son      Social History     Social History     Marital status:      Spouse name: N/A     Number of children: N/A     Years of education: N/A     Occupational History     Not on file.     Social History Main Topics     Smoking status: Never Smoker     Smokeless tobacco: Never Used     Alcohol use Yes      Comment: occasional     Drug use: No     Sexual activity: Not on file     Other Topics Concern     Not on file     Social History Narrative    , [place]  Retired from business.  Six children, including Malia, DM educator.      Current Outpatient Prescriptions   Medication Sig Dispense Refill     acetaminophen (TYLENOL) 500 MG tablet Take 500 mg by mouth every 6 (six) hours as needed for pain.       amLODIPine (NORVASC) 10 MG tablet Take 1 tablet (10 mg total) by mouth daily. 90 tablet 3     atorvastatin (LIPITOR) 40 MG tablet Take 40 mg by mouth at bedtime.       cholecalciferol, vitamin D3, 1,000 unit tablet Take 2,000 Units by  mouth daily.        levETIRAcetam (KEPPRA) 500 MG tablet Take 500 mg by mouth in the morning and 1000 mg by mouth in the evening  2     sertraline (ZOLOFT) 50 MG tablet Take 50 mg by mouth every evening.   3     UBIDECARENONE (COQ-10 ORAL) Take 1 capsule by mouth daily.       No current facility-administered medications for this visit.      No Known Allergies      Review of Systems:    Constitutional: Negative.  Negative for fever, chills, has activity change, appetite change and fatigue.   Bps have been low  HENT: Negative for congestion and facial swelling.    Eyes: Negative for photophobia, redness and visual disturbance.   Respiratory: Negative for cough and chest tightness.    l breast hematoma  Cardiovascular: Negative for chest pain, palpitations and has leg swelling.   Gastrointestinal: Negative for nausea, diarrhea, constipation, blood in stool and abdominal distention.   Genitourinary: Negative.    Musculoskeletal: Negative.  She feels weaker than usual  Skin: Negative.    Neurological: Negative for dizziness, tremors, syncope, weakness, light-headedness and headaches.   Hematological: Does not bruise/bleed easily.   Psychiatric/Behavioral: Negative.    Mood is frustrated and sad    Vitals:    04/17/18 1045   BP: 108/64   Pulse: 60   Temp: 98  F (36.7  C)       Physical Exam:    GENERAL: no acute distress. Cooperative in conversation. Diffuse ecchymosis noted which is fading  HEENT: pupils are equal, round and reactive. Oral mucosa is moist and intact.  RESP:Chest symmetric. Regular respiratory rate. No stridor.  left  breast with a hematoma  CVS: S1S2  ABD: Nondistended, soft.  EXTREMITIES: 2+ lower extremity edema.   NEURO: non focal. Alert and oriented x3. Left sided weakness; some left SIDED visual neglect  PSYCH: within normal limits. No depression or anxiety.  SKIN: warm dry intact extensive ecchymoses noted on the face; thorax and flank    Labs:    Recent Results (from the past 240 hour(s))   Basic  Metabolic Panel   Result Value Ref Range    Sodium 137 136 - 145 mmol/L    Potassium 4.6 3.5 - 5.0 mmol/L    Chloride 103 98 - 107 mmol/L    CO2 23 22 - 31 mmol/L    Anion Gap, Calculation 11 5 - 18 mmol/L    Glucose 168 (H) 70 - 125 mg/dL    Calcium 8.1 (L) 8.5 - 10.5 mg/dL    BUN 30 (H) 8 - 28 mg/dL    Creatinine 0.96 0.60 - 1.10 mg/dL    GFR MDRD Af Amer >60 >60 mL/min/1.73m2    GFR MDRD Non Af Amer 56 (L) >60 mL/min/1.73m2   INR   Result Value Ref Range    INR 2.90 (H) 0.90 - 1.10   Levetiracetam [Keppra ]   Result Value Ref Range    Levetiracetam 27.8 6.0 - 46.0 ug/mL   HM1 (CBC with Diff)   Result Value Ref Range    WBC 9.7 4.0 - 11.0 thou/uL    RBC 2.28 (L) 3.80 - 5.40 mill/uL    Hemoglobin 6.9 (LL) 12.0 - 16.0 g/dL    Hematocrit 20.2 (L) 35.0 - 47.0 %    MCV 89 80 - 100 fL    MCH 30.1 27.0 - 34.0 pg    MCHC 33.9 32.0 - 36.0 g/dL    RDW 13.0 11.0 - 14.5 %    Platelets 341 140 - 440 thou/uL    MPV 7.8 7.0 - 10.0 fL    Neutrophils % 79 (H) 50 - 70 %    Lymphocytes % 13 (L) 20 - 40 %    Monocytes % 7 2 - 10 %    Eosinophils % 2 0 - 6 %    Basophils % 0 0 - 2 %    Neutrophils Absolute 7.6 2.0 - 7.7 thou/uL    Lymphocytes Absolute 1.2 0.8 - 4.4 thou/uL    Monocytes Absolute 0.7 0.0 - 0.9 thou/uL    Eosinophils Absolute 0.2 0.0 - 0.4 thou/uL    Basophils Absolute 0.0 0.0 - 0.2 thou/uL   Basic Metabolic Panel   Result Value Ref Range    Sodium 135 (L) 136 - 145 mmol/L    Potassium 3.9 3.5 - 5.0 mmol/L    Chloride 101 98 - 107 mmol/L    CO2 25 22 - 31 mmol/L    Anion Gap, Calculation 9 5 - 18 mmol/L    Glucose 171 (H) 70 - 125 mg/dL    Calcium 8.4 (L) 8.5 - 10.5 mg/dL    BUN 30 (H) 8 - 28 mg/dL    Creatinine 1.04 0.60 - 1.10 mg/dL    GFR MDRD Af Amer >60 >60 mL/min/1.73m2    GFR MDRD Non Af Amer 51 (L) >60 mL/min/1.73m2   INR   Result Value Ref Range    INR 2.84 (H) 0.90 - 1.10   Magnesium   Result Value Ref Range    Magnesium 1.8 1.8 - 2.6 mg/dL   HM1 (CBC with Diff)   Result Value Ref Range    WBC 10.1 4.0 - 11.0  thou/uL    RBC 2.30 (L) 3.80 - 5.40 mill/uL    Hemoglobin 6.9 (LL) 12.0 - 16.0 g/dL    Hematocrit 22.0 (L) 35.0 - 47.0 %    MCV 96 80 - 100 fL    MCH 30.0 27.0 - 34.0 pg    MCHC 31.4 (L) 32.0 - 36.0 g/dL    RDW 13.8 11.0 - 14.5 %    Platelets 333 140 - 440 thou/uL    MPV 11.4 8.5 - 12.5 fL    Neutrophils % 78 (H) 50 - 70 %    Lymphocytes % 12 (L) 20 - 40 %    Monocytes % 8 2 - 10 %    Eosinophils % 1 0 - 6 %    Basophils % 0 0 - 2 %    Neutrophils Absolute 7.9 (H) 2.0 - 7.7 thou/uL    Lymphocytes Absolute 1.2 0.8 - 4.4 thou/uL    Monocytes Absolute 0.8 0.0 - 0.9 thou/uL    Eosinophils Absolute 0.1 0.0 - 0.4 thou/uL    Basophils Absolute 0.0 0.0 - 0.2 thou/uL   ECG 12 lead nursing unit performed   Result Value Ref Range    SYSTOLIC BLOOD PRESSURE 102 mmHg    DIASTOLIC BLOOD PRESSURE 59 mmHg    VENTRICULAR RATE 69 BPM    ATRIAL RATE 69 BPM    P-R INTERVAL 160 ms    QRS DURATION 108 ms    Q-T INTERVAL 398 ms    QTC CALCULATION (BEZET) 426 ms    P Axis 27 degrees    R AXIS -43 degrees    T AXIS 27 degrees    MUSE DIAGNOSIS       ** Poor data quality, interpretation may be adversely affected  Sinus rhythm with occasional Premature ventricular complexes  Left axis deviation  Anterior infarct (cited on or before 04-APR-2018)  Abnormal ECG  When compared with ECG of 04-APR-2018 20:33,  Premature ventricular complexes are now Present  Nonspecific T wave abnormality now evident in Inferior leads  Nonspecific T wave abnormality now evident in Anterior leads  Confirmed by SHARON HODGES MD LOC:J[facility] ([id number]) on 4/10/2018 5:02:12 PM     Type and Screen   Result Value Ref Range    ABORh O POS     Antibody Screen Negative Negative   Protime-INR   Result Value Ref Range    INR 1.31 (H) 0.90 - 1.10   HM1 (CBC with Diff)   Result Value Ref Range    WBC 5.7 4.0 - 11.0 thou/uL    RBC 2.21 (L) 3.80 - 5.40 mill/uL    Hemoglobin 6.6 (LL) 12.0 - 16.0 g/dL    Hematocrit 20.6 (L) 35.0 - 47.0 %    MCV 93 80 - 100 fL    MCH 29.9 27.0 - 34.0 pg     MCHC 32.0 32.0 - 36.0 g/dL    RDW 13.9 11.0 - 14.5 %    Platelets 282 140 - 440 thou/uL    MPV 10.1 8.5 - 12.5 fL    Neutrophils % 61 50 - 70 %    Lymphocytes % 23 20 - 40 %    Monocytes % 12 (H) 2 - 10 %    Eosinophils % 4 0 - 6 %    Basophils % 1 0 - 2 %    Neutrophils Absolute 3.4 2.0 - 7.7 thou/uL    Lymphocytes Absolute 1.3 0.8 - 4.4 thou/uL    Monocytes Absolute 0.7 0.0 - 0.9 thou/uL    Eosinophils Absolute 0.2 0.0 - 0.4 thou/uL    Basophils Absolute 0.0 0.0 - 0.2 thou/uL   Hemoglobin   Result Value Ref Range    Hemoglobin 6.7 (LL) 12.0 - 16.0 g/dL   Hemoglobin   Result Value Ref Range    Hemoglobin 8.6 (L) 12.0 - 16.0 g/dL   Hepatic Profile   Result Value Ref Range    Bilirubin, Total 2.6 (H) 0.0 - 1.0 mg/dL    Bilirubin, Direct 0.9 (H) <=0.5 mg/dL    Protein, Total 5.7 (L) 6.0 - 8.0 g/dL    Albumin 2.2 (L) 3.5 - 5.0 g/dL    Alkaline Phosphatase 103 45 - 120 U/L    AST 56 (H) 0 - 40 U/L    ALT 32 0 - 45 U/L   Haptoglobin   Result Value Ref Range    Haptoglobin 53 33 - 171 mg/dL   Lactate Dehydrogenase (LDH)   Result Value Ref Range    LD (LDH) 392 (H) 125 - 220 U/L   Protime-INR   Result Value Ref Range    INR 1.21 (H) 0.90 - 1.10   Crossmatch   Result Value Ref Range    Crossmatch Compatible     Blood Expiration Date 49708203795759     Unit Type O Pos     Unit Number [id number]     Status Transfused     Component Red Blood Cells     PRODUCT CODE S3918E67     Issue Date and Time 05926144809755     Blood Type 5100     CODING SYSTEM [id number]    Plasma Product Information   Result Value Ref Range    Unit Type O Pos     Blood Expiration Date 20180412114800     Unit Number [id number]     Status Transfused     Component FROZEN PLASMA     PRODUCT CODE O6375Z02     Issue Date and Time 20180410215100     Blood Type 5100     CODING SYSTEM [id number]    Plasma Product Information   Result Value Ref Range    Unit Type O Pos     Blood Expiration Date 88545729272849     Unit Number [id number]     Status Transfused      Component FROZEN PLASMA     PRODUCT CODE P1551F77     Issue Date and Time 17209864905388     Blood Type 5100     CODING SYSTEM [id number]    Crossmatch   Result Value Ref Range    Crossmatch Compatible     Blood Expiration Date 20180421235900     Unit Type O Neg     Unit Number [id number]     Status Transfused     Component Red Blood Cells     PRODUCT CODE A2264Q24     Issue Date and Time 66825855901501     Blood Type 9500     CODING SYSTEM [id number]    HM2(CBC W/O DIFF)   Result Value Ref Range    WBC 6.6 4.0 - 11.0 thou/uL    RBC 2.69 (L) 3.80 - 5.40 mill/uL    Hemoglobin 8.0 (L) 12.0 - 16.0 g/dL    Hematocrit 24.5 (L) 35.0 - 47.0 %    MCV 91 80 - 100 fL    MCH 29.7 27.0 - 34.0 pg    MCHC 32.7 32.0 - 36.0 g/dL    RDW 15.4 (H) 11.0 - 14.5 %    Platelets 294 140 - 440 thou/uL    MPV 10.4 8.5 - 12.5 fL   Comprehensive Metabolic Panel   Result Value Ref Range    Sodium 142 136 - 145 mmol/L    Potassium 3.5 3.5 - 5.0 mmol/L    Chloride 109 (H) 98 - 107 mmol/L    CO2 25 22 - 31 mmol/L    Anion Gap, Calculation 8 5 - 18 mmol/L    Glucose 99 70 - 125 mg/dL    BUN 20 8 - 28 mg/dL    Creatinine 0.87 0.60 - 1.10 mg/dL    GFR MDRD Af Amer >60 >60 mL/min/1.73m2    GFR MDRD Non Af Amer >60 >60 mL/min/1.73m2    Bilirubin, Total 2.0 (H) 0.0 - 1.0 mg/dL    Calcium 8.3 (L) 8.5 - 10.5 mg/dL    Protein, Total 5.5 (L) 6.0 - 8.0 g/dL    Albumin 2.1 (L) 3.5 - 5.0 g/dL    Alkaline Phosphatase 97 45 - 120 U/L    AST 51 (H) 0 - 40 U/L    ALT 36 0 - 45 U/L   Protime-INR   Result Value Ref Range    INR 1.19 (H) 0.90 - 1.10   Hemoglobin   Result Value Ref Range    Hemoglobin 9.0 (L) 12.0 - 16.0 g/dL   Hemoglobin   Result Value Ref Range    Hemoglobin 10.0 (L) 12.0 - 16.0 g/dL   Levetiracetam [Keppra ]   Result Value Ref Range    Levetiracetam 24.1 6.0 - 46.0 ug/mL     Ct Head Without Contrast    Result Date: 4/10/2018  [facility] CT HEAD WO CONTRAST 4/10/2018 3:00 PM INDICATION: Fall, on coumadin, right sided facial bruising  TECHNIQUE: Without IV contrast. Dose reduction techniques were used. CONTRAST: None COMPARISON:  CT head 04/04/2018 FINDINGS: Images degraded by patient motion inferiorly. Within these limits no space-occupying hemorrhage, mass effect, or definite acute infarct identified. Chronic encephalomalacia in the right MCA territory as seen previously. Small chronic lacunar infarct of the right cerebellum. Mild generalized volume loss. Mild ex vacuo dilation of the right lateral ventricle. Mild right frontal scalp swelling. No depressed calvarial fracture. The visualized orbits, paranasal sinuses and mastoid air cells are free of significant disease within the limits of motion degraded evaluation.     CONCLUSION: 1.  Mildly motion degraded evaluation. Within these limits no CT evidence for acute intracranial hemorrhage mass effect, or definite acute infarct. 2.  Mild right frontal scalp swelling without evidence for associated fracture. 3.  Chronic right MCA territory encephalomalacia similar to the previous CT. Smaller chronic lacunar infarct of the right cerebellum.     Ct Head Without Contrast    Result Date: 4/4/2018  [facility] CT HEAD WO CONTRAST 4/4/2018 9:46 PM INDICATION: Seizures new or progressive seizure, supratherapeutic INR. TECHNIQUE: Without IV contrast. Dose reduction techniques were used. CONTRAST: None. COMPARISON:  Brain MRI 01/21/2018. FINDINGS: Small right frontal scalp contusion. No acute intracranial hemorrhage, midline shift, or mass effect. Large chronic right temporoparietal infarct again noted. Small chronic lacunar infarcts in each cerebellar hemisphere. This is stable. No definite CT evidence of acute infarct. No displaced calvarial fracture. Normal parenchymal density for age. The ventricles and sulci are normal for age. Osseous structures are intact. The visualized orbits, paranasal sinuses and mastoid air cells are free of significant disease.     CONCLUSION: 1.  No acute  hemorrhage, midline shift, or mass effect. Right frontal scalp hematoma. 2.  Large area of encephalomalacia/prior ischemia right MCA distribution again noted. 3.  Small chronic lacunar infarcts in each cerebellar hemisphere.    Assessment/Plan:   ABLA-taken off anti-Coagulation with Coumadin. given multiple units of blood transfusion with stabilization of hemoglobin at 9.  This seems to be spontaneous hemorrhage with workup in the hospital being negative.  She had normal INR at 2.9.  Her platelets count were normal  Left breast hematoma secondary to bleeding seen by surgery and conservative treatment ,icing follow-up as an outpatient.  Hematuria in the setting of chronic anticoagulation CT showed hemorrhage into the left kidney and collecting system  Evaluated by urology.  She will have an outpatient cystoscopy and urethroscopy.  Currently denying any blood in urine  History of atrial fibrillation no longer felt to be a good candidate for Coumadin and has been recommended to go in for consideration for watchman's device as an outpatient and follow-up in cardiology  As per cardiology she will need 6-8 weeks of anti-Coagulation postprocedure.  History of seizure disorder felt to be recurrent by neurology while during this hospitalization -again this is controversial .  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  History of depression/mood disorder with emotional lability- she takes sertraline  Vitamin D deficiency on supplements  Hyperlipidemia she is on atorvastatin  Hypertension with low blood pressures documented in the TCU currently on amlodipine 10 mg daily plan is to hold medication based on blood pressure parameters and monitor closely  Debilitation currently in the  TCU for rehab  Total time spent was 45 minutes, more than half of it was in face-to-face counseling regarding disease state, treatment, side effects, documentation, review of clinical data and coordination of care    Electronically signed by: BENNY Bertrand  This progress note was completed using Dragon software and there may be grammatical errors.

## 2021-06-17 NOTE — PROGRESS NOTES
[facility]      Code Status:  FULL CODE  Visit Type: Review Of Multiple Medical Conditions     Facility:  [facility] [[id number]]           History of Present Illness: Marcia Kelley is a 77 y.o. female who is currently admitted to the TCU as a transfer from the hospital.  . This is a 76-year-old with past medical history significant only for hypertension and who was completely independent with her ADLs using no assist device who presented to the hospitalin 2017  after a fall.  She was admitted with acute right MCA territory stroke associated with left-sided weakness , speech difficulty; confusion and collapse. She was given IV TPA and from back to me was attempted but was unsuccessful course was complicated by hemorrhage.  She also developed atrial fibrillation in the postoperative period.  She was in the TCU for rehab did quite well and was discharged independent with her ADLs to her home  She represented back to the hospital with increased bruising hematuria and a hemoglobin dropped down to 6.6.  She was on chronic anticoagulation with Coumadin due to atrial fibrillation this was discontinued and patient had a prolonged seizure a week prior to presentation.  She had seizure disorder since September 2017 and did not tolerate phenytoin and has been on Keppra.  She has remained on a higher dose of Keppra.  Subsequently she went to the [facility] where EMG was done.  No seizure activity was noted and subsequently her Keppra dosage was reduced  Subsequently she had another seizure week prior to presentation.  She then re-presented to the hospital in the absence of any trauma or seizure with increased bruising hematuria.  She was admitted INR was 2.9 on admission  She was admitted with acute blood loss anemia with renal hemorrhage and breast hematoma.    She has an outpatient follow-up with both her surgeon as well as a urologist and is refusing to go to both.  Her   was contacted and finally new appointments were again made for her at least to follow-up for evaluation  Therapy updated me that she had a period of unresponsiveness while in therapy suspected to be seizure-like activity she is denying that states that she has no recall of any such episode this is worrisome in a patient who is already on a high level of Keppra.    Past Medical History:   Diagnosis Date     Acute right MCA stroke      Anorexia      Cranial neuropathies, multiple      Depressive disorder      Hemiplegia affecting left nondominant side      HTN (hypertension)      Hypokalemia      Hypovitaminosis D      Iliotibial band syndrome      Impaired mobility and activities of daily living      Influenza B      Paroxysmal atrial fibrillation      Seizure disorder     pt reports secondary to stroke.     Urinary incontinence      Past Surgical History:   Procedure Laterality Date     AUGMENTATION MAMMAPLASTY Bilateral      TONSILLECTOMY       Family History   Problem Relation Age of Onset     Heart attack Mother      Diabetes Mother      Heart attack Father      COPD Brother      Coronary artery disease Brother      Retinal detachment Brother      Peripheral vascular disease Brother      Tremor Brother      No Medical Problems Daughter      No Medical Problems Daughter      No Medical Problems Daughter      No Medical Problems Son      No Medical Problems Son      No Medical Problems Son      Social History     Social History     Marital status:      Spouse name: N/A     Number of children: N/A     Years of education: N/A     Occupational History     Not on file.     Social History Main Topics     Smoking status: Never Smoker     Smokeless tobacco: Never Used     Alcohol use Yes      Comment: occasional     Drug use: No     Sexual activity: Not on file     Other Topics Concern     Not on file     Social History Narrative    , [place]  Retired from business.  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      Current Outpatient Prescriptions   Medication Sig Dispense Refill     acetaminophen (TYLENOL) 500 MG tablet Take 500 mg by mouth every 6 (six) hours as needed for pain.       amLODIPine (NORVASC) 10 MG tablet Take 1 tablet (10 mg total) by mouth daily. 90 tablet 3     atorvastatin (LIPITOR) 40 MG tablet Take 40 mg by mouth at bedtime.       cholecalciferol, vitamin D3, 1,000 unit tablet Take 2,000 Units by mouth daily.        levETIRAcetam (KEPPRA) 500 MG tablet Take 500 mg by mouth in the morning and 1000 mg by mouth in the evening  2     sertraline (ZOLOFT) 50 MG tablet Take 50 mg by mouth every evening.   3     UBIDECARENONE (COQ-10 ORAL) Take 1 capsule by mouth daily.       No current facility-administered medications for this visit.      No Known Allergies      Review of Systems:    Constitutional: Negative.  Negative for fever, chills, has activity change, appetite change and fatigue.   Bps have been low  HENT: Negative for congestion and facial swelling.    Eyes: Negative for photophobia, redness and visual disturbance.   Respiratory: Negative for cough and chest tightness.    l breast hematoma  Cardiovascular: Negative for chest pain, palpitations and has leg swelling.   Gastrointestinal: Negative for nausea, diarrhea, constipation, blood in stool and abdominal distention.   Genitourinary: Negative.    Musculoskeletal: Negative.  She feels weaker than usual  Skin: Negative.    Neurological: Negative for dizziness, tremors, syncope, weakness, light-headedness and headaches.   Hematological: Does not bruise/bleed easily.   Psychiatric/Behavioral: Negative.    Mood is frustrated and sad    /67 P67 T98    Physical Exam:    GENERAL: no acute distress. Cooperative in conversation. Diffuse ecchymosis noted which is fading  HEENT: pupils are equal, round and reactive. Oral mucosa is moist and intact.  RESP:Chest symmetric. Regular respiratory rate. No stridor.  left  breast with a  hematoma  CVS: S1S2  ABD: Nondistended, soft.  EXTREMITIES: 2+ lower extremity edema.   NEURO: non focal. Alert and oriented x3. Left sided weakness; some left SIDED visual neglect  PSYCH: within normal limits. No depression or anxiety.  SKIN: warm dry intact extensive ecchymoses noted on the face; thorax and flank  Left breast continues to be larger than the right with significant hematoma still noted ' less tender    Labs:    Recent Results (from the past 240 hour(s))   Hemoglobin   Result Value Ref Range    Hemoglobin 10.0 (L) 12.0 - 16.0 g/dL   Levetiracetam [Keppra ]   Result Value Ref Range    Levetiracetam 24.1 6.0 - 46.0 ug/mL   APTT(PTT)   Result Value Ref Range    PTT 30 24 - 37 seconds         Assessment/Plan:   ABLA-taken off anti-Coagulation with Coumadin. given multiple units of blood transfusion with stabilization of hemoglobin at 9.  This seems to be spontaneous hemorrhage with workup in the hospital being negative.  She had normal INR at 2.9.  Her platelets count were normal  R/C HG 10  Left breast hematoma secondary to bleeding seen by surgery and conservative treatment ,icing follow-up as an outpatient.  She had canceled her follow-up appointment and I have advised her to keep that appointment in light of her persistent swelling.  Hematuria in the setting of chronic anticoagulation CT showed hemorrhage into the left kidney and collecting system  Evaluated by urology.  She will have an outpatient cystoscopy and urethroscopy.  Currently denying any blood in urine  Again refusing to keep that appointment  History of atrial fibrillation no longer felt to be a good candidate for Coumadin and has been recommended to go in for consideration for watchman's device as an outpatient and follow-up in cardiology  As per cardiology she will need 6-8 weeks of anti-Coagulation postprocedure.  History of seizure disorder felt to be recurrent by neurology while during this hospitalization -again this is  controversial .  she was seen by [facility] and EMG was negative  However in light of repeated seizure-like activity they have increased her Keppra and recommended outpatient follow-up  Recheck Keppra levels ordered for a week or 24.  Unfortunately there is some concern of ongoing seizure-like activity for which she has no recall.  This was a witnessed episode while she was in therapy plan is to check some baseline labs on her including a UA UC  Recheck another Keppra level on her  Increase her Keppra dosage to 1 g twice daily and have her follow urgently with a neurologist to discuss this  Status post CVA old with left-sided hemiparesis doing quite well.  History of depression/mood disorder with emotional lability- she takes sertraline  Vitamin D deficiency on supplements  Hyperlipidemia she is on atorvastatin  Hypertension with low blood pressures documented in the TCU currently on amlodipine 10 mg daily   Blood pressures are low most of the days and medication is held  Debilitation currently in the TCU for rehab  Monitor closely for any repeat seizure-like activity in the meantime she and her  was consulted and requested to keep appointment for follow-up.  And an urgent appointment with neurology was requested follow-up on her labs  [facility] 24/30 balance score is 13/28 indicating high fall risk  Total time spent was 35 minutes, more than half of it was in face-to-face counseling regarding disease state, treatment, side effects, documentation, review of clinical data and coordination of care    Electronically signed by: BENNY Bertrand  This progress note was completed using Dragon software and there may be grammatical errors.

## 2021-06-17 NOTE — PROGRESS NOTES
"  [facility]      NAME:  Marcia Kelley             :  1940    MRN: [id number]    CODE STATUS:  FULL CODE    FACILITY: [facility] [[id number]]         CHIEF COMPLAIN/REASON FOR VISIT:  Chief Complaint   Patient presents with     Problem Visit       HISTORY OF PRESENT ILLNESS: Marcia Kelley is a 77 y.o. female being seen today  Per nursing request. She was seen by rounding provider yesterday for d/c orders, which were signed. However, pt saw neuro yesterday and had changes in her Keppra and a new order for ativan.  . She had a recent stay at [facility] from 4/10 to  and is now on the TCU. She was admitted to the hospital with acute blood loss anemia. PMH also includes afib and seizure disorders. She had hematuria a few weeks back and was seen by  where she was found to have hemorrhage into the left kidney and collecting system per her CT.  She also developed a large hematoma of her left breast which seems spontaneous.  She had bruising in multiple spots including her right eye, thorax and buttocks.  Prior to hematoma and bruising she had apparently had had a seizure her HBG was down to 6.9 and did get 2 units of PRBC. She is having Keppra adjustments and will have a new CBC. She is seen in her room today. She has concerns that the f/u apt with internist would like t, in her words, \"do a sonagram and drain her hematoma with a needle\", she does have a f/u ultrasound 2 weeks s/p d/c. She is seen in her room with her souse today.     No Known Allergies:     Current Outpatient Prescriptions   Medication Sig     LORazepam (ATIVAN) 0.5 MG tablet Take 0.5 mg by mouth daily as needed for anxiety.     acetaminophen (TYLENOL) 500 MG tablet Take 500 mg by mouth every 6 (six) hours as needed for pain.     amLODIPine (NORVASC) 10 MG tablet Take 1 tablet (10 mg total) by mouth daily. (Patient taking differently: Take 5 mg by mouth daily. )     atorvastatin (LIPITOR) " 40 MG tablet Take 40 mg by mouth at bedtime.     cholecalciferol, vitamin D3, 1,000 unit tablet Take 2,000 Units by mouth daily.      levETIRAcetam (KEPPRA) 500 MG tablet Take 500 mg by mouth in the morning and 1000 mg by mouth in the evening     sertraline (ZOLOFT) 50 MG tablet Take 50 mg by mouth every evening.      UBIDECARENONE (COQ-10 ORAL) Take 1 capsule by mouth daily.         REVIEW OF SYSTEMS:    Currently, no fever, chills, or rigors. Does not have any visual or hearing problems. Denies any chest pain, headaches, palpitations, lightheadedness, dizziness, shortness of breath, or cough. Appetite is good. Denies any GERD symptoms. Denies any difficulty with swallowing, nausea, or vomiting.  Denies any abdominal pain, diarrhea or constipation. Denies any urinary symptoms. No insomnia. No active bleeding. No rash. Pain is stable.      PHYSICAL EXAMINATION:  There were no vitals filed for this visit.      GENERAL: Awake alert and oriented.Frail appearing female.  HEENT: Head is normocephalic with normal hair distribution. No evidence of trauma. Ears: No acute purulent discharge. Eyes: Conjunctivae pink with no scleral jaundice. Nose: Normal mucosa and septum.  CHEST:Right sided large hematoma as well as bruisng to skin  LUNG: Clear to auscultation with good chest expansion. There are no crackles or wheezes, normal AP diameter.  BACK: Has mild kyphosis of the thoracic spine. Symmetric, no curvature, ROM normal, no CVA tenderness, no spinal tenderness   CVS: There is good S1  S2, rhythm is regular.  ABDOMEN: Globular and soft, nontender to palpation, non distended, no masses, no organomegaly, good bowel sounds, no rebound or guarding, no peritoneal signs.   EXTREMITIES: Atraumatic. Full range of motion on both upper and lower extremities, there is no tenderness to palpation, has  pedal edema, no cyanosis or clubbing, no calf tenderness, normal cap refill, no joint swelling.  SKIN: Warm and dry, multiple bruising  areas, fading.  NEUROLOGICAL: The patient is oriented to person, place and time. Strength and sensation are grossly intact. Face is symmetric.        LABS:    Lab Results   Component Value Date    WBC 4.2 04/25/2018    HGB 9.9 (L) 04/25/2018    HCT 31.7 (L) 04/25/2018    MCV 98 04/25/2018     04/25/2018       Results for orders placed or performed in visit on 04/25/18   Basic Metabolic Panel   Result Value Ref Range    Sodium 140 136 - 145 mmol/L    Potassium 4.3 3.5 - 5.0 mmol/L    Chloride 107 98 - 107 mmol/L    CO2 23 22 - 31 mmol/L    Anion Gap, Calculation 10 5 - 18 mmol/L    Glucose 74 70 - 125 mg/dL    Calcium 8.3 (L) 8.5 - 10.5 mg/dL    BUN 21 8 - 28 mg/dL    Creatinine 0.68 0.60 - 1.10 mg/dL    GFR MDRD Af Amer >60 >60 mL/min/1.73m2    GFR MDRD Non Af Amer >60 >60 mL/min/1.73m2           No results found for: HGBA1C  Vitamin D, Total (25-Hydroxy)   Date Value Ref Range Status   06/01/2017 35.4 30.0 - 80.0 ng/mL Final     No results found for: [id number]    ASSESSMENT/PLAN:  1. Seizure disorder    2. Anxiety      Pt remains on Keppra for seizures, seen by neuro yesterday. I reviewed her d/c orders and added the changes neuro made on 5/8 .  Her Keppra will be 500 mg po Q AM and 1000 mg po QHS.  Neuro also ordered ativan 0.5 mg po EVERY DAY PRN. I spoke with spouse as pt has not had anxiety, he reports it is to give pt pre seizure. I agreed to write RX  For # 10 and then she will need to get further meds from her primary pr from her neuro.  D/C orders written on 5/8 per lizzy MCDUFFIE    Electronically signed by:  Carmenza Matthews CNP  This progress note was completed using Dragon software and there may be grammatical errors.    35 minutes spent of which greater than 50 % was face to face communication with the patient about above plan of care

## 2021-06-17 NOTE — PROGRESS NOTES
[facility]      NAME:  Marcia Kelley             :  1940    MRN: [id number]    CODE STATUS:  FULL CODE    FACILITY: [facility] [[id number]]         CHIEF COMPLAIN/REASON FOR VISIT:  Chief Complaint   Patient presents with     Review Of Multiple Medical Conditions       HISTORY OF PRESENT ILLNESS: Marcia Kelley is a 77 y.o. female being seen for review of multiple medical conditions after a stay at [facility] from 4/10 to  and is now on the TCU. She was admitted to the hospital with acute blood loss anemia. PMH also includes afib and seizure disorders. She had hematuria a few weeks back and was seen by  where she was found to have hemorrhage into the left kidney and collecting system per her CT.  She also developed a large hematoma of her left breast which seems spontaneous.  She had bruising in multiple spots including her right eye, thorax and buttocks.  Prior to hematoma and bruising she had apparently had had a seizure her HBG was down to 6.9 and did get 2 units of PRBC. She is having Keppra adjustments and will have a new CBC. She is seen in her room today and family present during assessment.         No Known Allergies:     Current Outpatient Prescriptions   Medication Sig     acetaminophen (TYLENOL) 500 MG tablet Take 500 mg by mouth every 6 (six) hours as needed for pain.     amLODIPine (NORVASC) 10 MG tablet Take 1 tablet (10 mg total) by mouth daily.     atorvastatin (LIPITOR) 40 MG tablet Take 40 mg by mouth at bedtime.     cholecalciferol, vitamin D3, 1,000 unit tablet Take 2,000 Units by mouth daily.      levETIRAcetam (KEPPRA) 500 MG tablet Take 500 mg by mouth in the morning and 1000 mg by mouth in the evening     sertraline (ZOLOFT) 50 MG tablet Take 50 mg by mouth every evening.      UBIDECARENONE (COQ-10 ORAL) Take 1 capsule by mouth daily.         REVIEW OF SYSTEMS:    Currently, no fever, chills, or rigors. Does not have any  visual or hearing problems. Denies any chest pain, headaches, palpitations, lightheadedness, dizziness, shortness of breath, or cough. Appetite is good. Denies any GERD symptoms. Denies any difficulty with swallowing, nausea, or vomiting.  Denies any abdominal pain, diarrhea or constipation. Denies any urinary symptoms. No insomnia. No active bleeding. No rash. Pain is stable.      PHYSICAL EXAMINATION:  Vitals:    04/17/18 1217   BP: 126/65   Pulse: (!) 56   Temp: 98.8  F (37.1  C)   Weight: 143 lb 12.8 oz (65.2 kg)         GENERAL: Awake alert and oriented.Frail appearing female.  HEENT: Head is normocephalic with normal hair distribution. No evidence of trauma. Ears: No acute purulent discharge. Eyes: Conjunctivae pink with no scleral jaundice. Nose: Normal mucosa and septum.  CHEST:Right sided large hematoma as well as bruisng to skin  LUNG: Clear to auscultation with good chest expansion. There are no crackles or wheezes, normal AP diameter.  BACK: Has mild kyphosis of the thoracic spine. Symmetric, no curvature, ROM normal, no CVA tenderness, no spinal tenderness   CVS: There is good S1  S2, rhythm is regular.  ABDOMEN: Globular and soft, nontender to palpation, non distended, no masses, no organomegaly, good bowel sounds, no rebound or guarding, no peritoneal signs.   EXTREMITIES: Atraumatic. Full range of motion on both upper and lower extremities, there is no tenderness to palpation, has  pedal edema, no cyanosis or clubbing, no calf tenderness, normal cap refill, no joint swelling.  SKIN: Warm and dry, multiple bruising areas.  NEUROLOGICAL: The patient is oriented to person, place and time. Strength and sensation are grossly intact. Face is symmetric.        LABS:    Lab Results   Component Value Date    WBC 6.6 04/12/2018    HGB 10.0 (L) 04/16/2018    HCT 24.5 (L) 04/12/2018    MCV 91 04/12/2018     04/12/2018       Results for orders placed or performed during the hospital encounter of 04/10/18    Basic Metabolic Panel   Result Value Ref Range    Sodium 135 (L) 136 - 145 mmol/L    Potassium 3.9 3.5 - 5.0 mmol/L    Chloride 101 98 - 107 mmol/L    CO2 25 22 - 31 mmol/L    Anion Gap, Calculation 9 5 - 18 mmol/L    Glucose 171 (H) 70 - 125 mg/dL    Calcium 8.4 (L) 8.5 - 10.5 mg/dL    BUN 30 (H) 8 - 28 mg/dL    Creatinine 1.04 0.60 - 1.10 mg/dL    GFR MDRD Af Amer >60 >60 mL/min/1.73m2    GFR MDRD Non Af Amer 51 (L) >60 mL/min/1.73m2           No results found for: HGBA1C  Vitamin D, Total (25-Hydroxy)   Date Value Ref Range Status   06/01/2017 35.4 30.0 - 80.0 ng/mL Final     No results found for: [id number]    ASSESSMENT/PLAN:  1. Seizure disorder    2. Acute blood loss anemia    3. Paroxysmal atrial fibrillation      Pt remains on Keppra for seizures, no reported seizures since admission, levl on the 18th.  She will have CBC as well on this date. Her POC is being developed as she is new and the IDT and MCS will work collectively to assist pt towards d/c.      Electronically signed by:  Carmenza Matthews CNP  This progress note was completed using Dragon software and there may be grammatical errors.    35 minutes spent of which greater than 50 % was face to face communication with the patient about above plan of care

## 2021-06-17 NOTE — PROGRESS NOTES
OFFICE VISIT NOTE    Subjective:   Chief Complaint:  Follow-up (last dose of warfarin was Wed/Thursday)    This is an extremely complicated woman with multiple problems including past history of cerebral infarction involving right middle cerebral artery, paroxysmal atrial fibrillation, probable seizure disorder, chronic anticoagulation.  Also has a history of a mood disorder.  The  she has been passing significant amounts of blood in the urine for the past 9-10 days.  She is seeing a urologist at present.  She had a significant partial seizure a few days ago followed by significant bruising of the face chest buttocks hips etc.  stopped the warfarin immediately.  He is given her aspirin every other day.  He has significant bruising though she is not sure there are any new wounds today.  She continues to have blood in the urine.  Apparently a CT scan was done this morning.    Current Outpatient Prescriptions   Medication Sig     amLODIPine (NORVASC) 10 MG tablet Take 1 tablet (10 mg total) by mouth daily.     aspirin 325 MG EC tablet Take 325 mg by mouth as needed for pain.     atorvastatin (LIPITOR) 40 MG tablet Take 40 mg by mouth at bedtime.     cholecalciferol, vitamin D3, 1,000 unit tablet Take 1,000 Units by mouth daily.     cloNIDine HCl (CATAPRES) 0.1 MG tablet Take 0.1 mg by mouth every 4 (four) hours as needed. SBP > 160     levETIRAcetam (KEPPRA) 500 MG tablet Take 500 mg by mouth 2 (two) times a day.     potassium phosphate, monobasic, (K-PHOS ORIGINAL) 500 mg tablet Take 500 mg by mouth daily.     sertraline (ZOLOFT) 50 MG tablet Take 50 mg by mouth every evening.      UBIDECARENONE (COQ-10 ORAL) Take 1 capsule by mouth daily.     warfarin (COUMADIN) 3 MG tablet Take 3-4.5 mg by mouth See Admin Instructions. Take 1 to 1.5 tablets (3 to 4.5 mg) by mouth daily. Adjust dose based on INR results as directed.  Patient takes 4.5 mg daily on Wednesday and 3 mg daily the rest of the week.        PSFHx: Tobacco Status:  She  reports that she has never smoked. She has never used smokeless tobacco.    Review of Systems:  A comprehensive review of systems is negative except for the comments above    Objective:    Pulse 70  GENERAL: No acute distress.  She is pale and looks chronically ill and somewhat weak.  Blood pressure is 102/58.  Pulse 72.  Oxygen saturations 95%.  Temperature measured 98 7.  Massive hematoma just above the left breast.  Disc coloration with ecchymosis on the right side of the face.  Bruising over both buttocks.  Small amount of bruising on the arms and legs.  Lungs do seem clear.  Heart shows slightly irregular rhythm.  This is either sinus with premature beats or possibly atrial fibrillation which she has had in the past.    Assessment & Plan   Marcia Kelley is a 77 y.o. female.     hemoglobin is 6.8.  INR is 2.9.  In the face of continued bleeding I suspect she is going to need admission.  We will send her over [facility] ER.    Diagnoses and all orders for this visit:    Seizure disorder    Internal bleeding    Coagulopathy    Hematuria            Oj Sanchez MD  Transcription using voice recognition software, may contain typographical errors.

## 2021-06-17 NOTE — PROGRESS NOTES
"  [facility]      NAME:  Marcia Kelley             :  1940    MRN: [id number]    CODE STATUS:  FULL CODE    FACILITY: [facility] [[id number]]         CHIEF COMPLAIN/REASON FOR VISIT:  Chief Complaint   Patient presents with     Review Of Multiple Medical Conditions       HISTORY OF PRESENT ILLNESS: Marcia Kelley is a 77 y.o. female being seen today for review of multiple medical conditions . She had a recent stay at [facility] from 4/10 to  and is now on the TCU. She was admitted to the hospital with acute blood loss anemia. PMH also includes afib and seizure disorders. She had hematuria a few weeks back and was seen by  where she was found to have hemorrhage into the left kidney and collecting system per her CT.  She also developed a large hematoma of her left breast which seems spontaneous.  She had bruising in multiple spots including her right eye, thorax and buttocks.  Prior to hematoma and bruising she had apparently had had a seizure her HBG was down to 6.9 and did get 2 units of PRBC. She is having Keppra adjustments and will have a new CBC. She is seen in her room today. She has concerns that the f/u apt with internist would like t, in her words, \"do a sonagram and drain her hematoma with a needle. We discussed that she needs to voice her concerns with him, however I was able to explain the basic procedure and this seemed to ease her a bit with the explanation. Continues in rehab and fees she is progressing well.         No Known Allergies:     Current Outpatient Prescriptions   Medication Sig     acetaminophen (TYLENOL) 500 MG tablet Take 500 mg by mouth every 6 (six) hours as needed for pain.     amLODIPine (NORVASC) 10 MG tablet Take 1 tablet (10 mg total) by mouth daily.     atorvastatin (LIPITOR) 40 MG tablet Take 40 mg by mouth at bedtime.     cholecalciferol, vitamin D3, 1,000 unit tablet Take 2,000 Units by mouth daily.      " levETIRAcetam (KEPPRA) 500 MG tablet Take 500 mg by mouth in the morning and 1000 mg by mouth in the evening     sertraline (ZOLOFT) 50 MG tablet Take 50 mg by mouth every evening.      UBIDECARENONE (COQ-10 ORAL) Take 1 capsule by mouth daily.         REVIEW OF SYSTEMS:    Currently, no fever, chills, or rigors. Does not have any visual or hearing problems. Denies any chest pain, headaches, palpitations, lightheadedness, dizziness, shortness of breath, or cough. Appetite is good. Denies any GERD symptoms. Denies any difficulty with swallowing, nausea, or vomiting.  Denies any abdominal pain, diarrhea or constipation. Denies any urinary symptoms. No insomnia. No active bleeding. No rash. Pain is stable.      PHYSICAL EXAMINATION:  Vitals:    05/02/18 1624   BP: 146/76   Pulse: (!) 55   Temp: 97.7  F (36.5  C)   Weight: 143 lb 4.8 oz (65 kg)         GENERAL: Awake alert and oriented.Frail appearing female.  HEENT: Head is normocephalic with normal hair distribution. No evidence of trauma. Ears: No acute purulent discharge. Eyes: Conjunctivae pink with no scleral jaundice. Nose: Normal mucosa and septum.  CHEST:Right sided large hematoma as well as bruisng to skin  LUNG: Clear to auscultation with good chest expansion. There are no crackles or wheezes, normal AP diameter.  BACK: Has mild kyphosis of the thoracic spine. Symmetric, no curvature, ROM normal, no CVA tenderness, no spinal tenderness   CVS: There is good S1  S2, rhythm is regular.  ABDOMEN: Globular and soft, nontender to palpation, non distended, no masses, no organomegaly, good bowel sounds, no rebound or guarding, no peritoneal signs.   EXTREMITIES: Atraumatic. Full range of motion on both upper and lower extremities, there is no tenderness to palpation, has  pedal edema, no cyanosis or clubbing, no calf tenderness, normal cap refill, no joint swelling.  SKIN: Warm and dry, multiple bruising areas, fading.  NEUROLOGICAL: The patient is oriented to  person, place and time. Strength and sensation are grossly intact. Face is symmetric.        LABS:    Lab Results   Component Value Date    WBC 4.2 04/25/2018    HGB 9.9 (L) 04/25/2018    HCT 31.7 (L) 04/25/2018    MCV 98 04/25/2018     04/25/2018       Results for orders placed or performed in visit on 04/25/18   Basic Metabolic Panel   Result Value Ref Range    Sodium 140 136 - 145 mmol/L    Potassium 4.3 3.5 - 5.0 mmol/L    Chloride 107 98 - 107 mmol/L    CO2 23 22 - 31 mmol/L    Anion Gap, Calculation 10 5 - 18 mmol/L    Glucose 74 70 - 125 mg/dL    Calcium 8.3 (L) 8.5 - 10.5 mg/dL    BUN 21 8 - 28 mg/dL    Creatinine 0.68 0.60 - 1.10 mg/dL    GFR MDRD Af Amer >60 >60 mL/min/1.73m2    GFR MDRD Non Af Amer >60 >60 mL/min/1.73m2           No results found for: HGBA1C  Vitamin D, Total (25-Hydroxy)   Date Value Ref Range Status   06/01/2017 35.4 30.0 - 80.0 ng/mL Final     No results found for: [id number]    ASSESSMENT/PLAN:  1. Posttraumatic hematoma of breast, left, initial encounter    2. Acute blood loss anemia      Pt remains on Keppra for seizures, no reported seizures since admission. Reports pain is doing well. Therapy progressing. Follow up next week with internist for her hematoma, may need needle aspiration..HBG 9.9 and HMCT 31.7, no fatigue reported.    [facility] will follow throughout her rehab stay    Electronically signed by:  Carmenza Matthews CNP  This progress note was completed using Dragon software and there may be grammatical errors.    25 minutes spent of which greater than 50 % was face to face communication with the patient about above plan of care

## 2021-06-17 NOTE — PROGRESS NOTES
[facility]      Code Status:  FULL CODE  Visit Type: Review Of Multiple Medical Conditions     Facility:  [facility] [[id number]]           History of Present Illness: Marcia Kelley is a 77 y.o. female who is currently admitted to the TCU as a transfer from the hospital.  . This is a 76-year-old who was in TCU after a CVA.  She also developed atrial fibrillation in the postoperative period.  She was in the TCU for rehab did quite well and was discharged independent with her ADLs to her home  She represented back to the hospital with increased bruising;  hematuria and a hemoglobin dropped down to 6.6.  She was on chronic anticoagulation with Coumadin due to atrial fibrillation this was discontinued and patient had a prolonged seizure a week prior to presentation.    She had seizure disorder since September 2017 and did not tolerate phenytoin and has been on Keppra.  She has remained on a higher dose of Keppra.  Subsequently she went to the [facility] where EMG was done.  No seizure activity was noted and subsequently her Keppra dosage was reduced  Subsequently she had another seizure week prior to presentation.  She then re-presented to the hospital in the absence of any trauma or seizure with increased bruising hematuria.  She was admitted INR was 2.9 on admission  She was admitted with acute blood loss anemia with renal hemorrhage and breast hematoma.    She has an outpatient follow-up with both her surgeon as well as a urologist   Due to an episode of unresponsiveness she is on a higher dose of Keppra.  Levels were checked and they are within therapeutic range.  In addition she has a follow-up appointment with her neurologist to discuss this I did check some routine labs all of which came within norms.  She is not reporting any repeated seizure-like activity.  Her ecchymosis seems to be fading nicely.  She continues to have significant swelling in her left  breast and was seen by her surgeon with no surgical intervention planned as of yet      Past Medical History:   Diagnosis Date     Acute right MCA stroke      Anorexia      Cranial neuropathies, multiple      Depressive disorder      Hemiplegia affecting left nondominant side      HTN (hypertension)      Hypokalemia      Hypovitaminosis D      Iliotibial band syndrome      Impaired mobility and activities of daily living      Influenza B      Paroxysmal atrial fibrillation      Seizure disorder     pt reports secondary to stroke.     Urinary incontinence      Past Surgical History:   Procedure Laterality Date     AUGMENTATION MAMMAPLASTY Bilateral      TONSILLECTOMY       Family History   Problem Relation Age of Onset     Heart attack Mother      Diabetes Mother      Heart attack Father      COPD Brother      Coronary artery disease Brother      Retinal detachment Brother      Peripheral vascular disease Brother      Tremor Brother      No Medical Problems Daughter      No Medical Problems Daughter      No Medical Problems Daughter      No Medical Problems Son      No Medical Problems Son      No Medical Problems Son      Social History     Social History     Marital status:      Spouse name: N/A     Number of children: N/A     Years of education: N/A     Occupational History     Not on file.     Social History Main Topics     Smoking status: Never Smoker     Smokeless tobacco: Never Used     Alcohol use Yes      Comment: occasional     Drug use: No     Sexual activity: Not on file     Other Topics Concern     Not on file     Social History Narrative    , [place]  Retired from business.  Six children, including Malia, DM educator.      Current Outpatient Prescriptions   Medication Sig Dispense Refill     acetaminophen (TYLENOL) 500 MG tablet Take 500 mg by mouth every 6 (six) hours as needed for pain.       amLODIPine (NORVASC) 10 MG tablet Take 1 tablet (10 mg total) by mouth daily. 90 tablet 3      atorvastatin (LIPITOR) 40 MG tablet Take 40 mg by mouth at bedtime.       cholecalciferol, vitamin D3, 1,000 unit tablet Take 2,000 Units by mouth daily.        levETIRAcetam (KEPPRA) 500 MG tablet Take 500 mg by mouth in the morning and 1000 mg by mouth in the evening  2     sertraline (ZOLOFT) 50 MG tablet Take 50 mg by mouth every evening.   3     UBIDECARENONE (COQ-10 ORAL) Take 1 capsule by mouth daily.       No current facility-administered medications for this visit.      No Known Allergies      Review of Systems:    Constitutional: Negative.  Negative for fever, chills, has activity change, appetite change and fatigue.   Bps have been low  HENT: Negative for congestion and facial swelling.    Eyes: Negative for photophobia, redness and visual disturbance.   Respiratory: Negative for cough and chest tightness.    l breast hematoma  Cardiovascular: Negative for chest pain, palpitations and has leg swelling.   Gastrointestinal: Negative for nausea, diarrhea, constipation, blood in stool and abdominal distention.   Genitourinary: Negative.    Musculoskeletal: Negative.  She feels weaker than usual  Skin: Negative.    Neurological: Negative for dizziness, tremors, syncope, weakness, light-headedness and headaches.   Hematological: Does not bruise/bleed easily.   Psychiatric/Behavioral: Negative.    Mood is stable.    Vitals:    05/03/18 1114   BP: 129/72   Pulse: 60   Temp: 98  F (36.7  C)       Physical Exam:    GENERAL: no acute distress. Cooperative in conversation. Diffuse ecchymosis noted which is fading  HEENT: pupils are equal, round and reactive. Oral mucosa is moist and intact.  RESP:Chest symmetric. Regular respiratory rate. No stridor.  left  breast with a hematoma; fading with increased fluctuance  CVS: S1S2  ABD: Nondistended, soft.  EXTREMITIES: 2+ lower extremity edema.   NEURO: non focal. Alert and oriented x3. Left sided weakness; some left SIDED visual neglect  PSYCH: within normal limits. No  depression or anxiety.  SKIN: warm dry intact extensive ecchymoses noted on the face; thorax and flank, this is  fading  Left breast continues to be larger than the right with significant hematoma still noted ' less tender    Labs:    Recent Results (from the past 240 hour(s))   Culture, Urine   Result Value Ref Range    Culture No Growth    Urinalysis   Result Value Ref Range    Color, UA Yellow Colorless, Yellow, Straw, Light Yellow    Clarity, UA Cloudy (!) Clear    Glucose, UA Negative Negative    Bilirubin, UA Negative Negative    Ketones, UA Negative Negative    Specific Gravity, UA 1.023 1.001 - 1.030    Blood, UA Moderate (!) Negative    pH, UA 5.0 4.5 - 8.0    Protein, UA Trace (!) Negative mg/dL    Urobilinogen, UA <2.0 E.U./dL <2.0 E.U./dL, 2.0 E.U./dL    Nitrite, UA Negative Negative    Leukocytes, UA Large (!) Negative    Bacteria, UA Many (!) None Seen hpf    RBC, UA 0-2 None Seen, 0-2 hpf    WBC, UA  (!) None Seen, 0-5 hpf    Squam Epithel, UA 5-10 (!) None Seen, 0-5 lpf    Trans Epithel, UA 5-10 (!) None Seen lpf    Mucus, UA Few (!) None Seen lpf    Hyaline Casts, UA 0-5 0-5, None Seen lpf   Basic Metabolic Panel   Result Value Ref Range    Sodium 140 136 - 145 mmol/L    Potassium 4.3 3.5 - 5.0 mmol/L    Chloride 107 98 - 107 mmol/L    CO2 23 22 - 31 mmol/L    Anion Gap, Calculation 10 5 - 18 mmol/L    Glucose 74 70 - 125 mg/dL    Calcium 8.3 (L) 8.5 - 10.5 mg/dL    BUN 21 8 - 28 mg/dL    Creatinine 0.68 0.60 - 1.10 mg/dL    GFR MDRD Af Amer >60 >60 mL/min/1.73m2    GFR MDRD Non Af Amer >60 >60 mL/min/1.73m2   HM2(CBC w/o Differential)   Result Value Ref Range    WBC 4.2 4.0 - 11.0 thou/uL    RBC 3.24 (L) 3.80 - 5.40 mill/uL    Hemoglobin 9.9 (L) 12.0 - 16.0 g/dL    Hematocrit 31.7 (L) 35.0 - 47.0 %    MCV 98 80 - 100 fL    MCH 30.6 27.0 - 34.0 pg    MCHC 31.2 (L) 32.0 - 36.0 g/dL    RDW 15.6 (H) 11.0 - 14.5 %    Platelets 271 140 - 440 thou/uL    MPV 11.4 8.5 - 12.5 fL   Levetiracetam  [Keppra ]   Result Value Ref Range    Levetiracetam 29.0 6.0 - 46.0 ug/mL         Assessment/Plan:   ELDER-taken off anti-Coagulation with Coumadin. given multiple units of blood transfusion with stabilization of hemoglobin at 9.  This seems to be spontaneous hemorrhage with workup in the hospital being negative.  She had normal INR at 2.9.  Her platelets count were normal  R/C HG 9.9    Left breast hematoma secondary to bleeding seen by surgery and conservative treatment ,icing follow-up as an outpatient.  Seen by berhane.    Hematuria in the setting of chronic anticoagulation CT showed hemorrhage into the left kidney and collecting system  Evaluated by urology.  She will have an outpatient cystoscopy and urethroscopy.  Currently denying any blood in urine    History of atrial fibrillation no longer felt to be a good candidate for Coumadin and has been recommended to go in for consideration for watchman's device as an outpatient and follow-up in cardiology  As per cardiology she will need 6-8 weeks of anti-Coagulation postprocedure.    History of seizure disorder felt to be recurrent by neurology while during this hospitalization -again this is controversial .  she was seen by [facility] and EMG was negative  However in light of repeated seizure-like activity they have increased her Keppra and recommended outpatient follow-up  Recheck Keppra levels ordered for a week or 24.  Unfortunately there is some concern of ongoing seizure-like activity for which she has no recall.  She was in therapy and had an absence seizure-like episode which lasted almost for 2-3 minutes as per staff.  She has no recall  We did initiate some workup which all came back negative I have elected to increase her Keppra level further to 1 g twice daily and she remains event free but we have advised her to closely follow her neurology to discuss this.  She is not reporting any repeat episodes since then    Bradycardia intermittent in  nature.  Unclear etiology and wondering if that cause her to have an absence seizure continue to monitor heart rates closely if need be she may need cardiac monitoring.  I did talk to the patient about this and she is not very keen to pursue this at this minute    Status post CVA old with left-sided hemiparesis doing quite well.  History of depression/mood disorder with emotional lability- she takes sertraline  Vitamin D deficiency on supplements  Hyperlipidemia she is on atorvastatin  Hypertension with low blood pressures documented in the TCU -amlodipine has been discontinued and given as needed only based on blood pressure  Debilitation currently in the TCU for rehab  Monitor closely for any repeat seizure-like activity in the meantime she will see her neurologist ASAP  Slums 24/30 balance score is 13/28 indicating high fall risk  Discharge planning reviwed with pt.struglling with dc planning as her  is sick and WC bound currently  Looking at respite care in long term  Total time spent was 35 minutes, more than half of it was in face-to-face counseling regarding disease state, treatment, side effects, documentation, review of clinical data and coordination of care    Electronically signed by: BENNY Bertrand  This progress note was completed using Dragon software and there may be grammatical errors.

## 2021-06-17 NOTE — PROGRESS NOTES
[facility]    Facility:   [facility] [[id number]]   Code Status: FULL CODE  PCP: Khurram Saldana MD   Phone: [phone]   Fax: [phone]      CHIEF COMPLAINT/REASON FOR VISIT:  Chief Complaint   Patient presents with     Discharge Summary       HISTORY COURSE:  Marcia Kelley is a 77 y.o. female who was admitted to the TCU as a transfer from the hospital.  . This is a 76-year-old who was in TCU after a CVA.  She also developed atrial fibrillation in the postoperative period.  She was in the TCU for rehab did quite well and was discharged independent with her ADLs to her home  She represented back to the hospital with increased bruising;  hematuria and a hemoglobin dropped down to 6.6.  She was on chronic anticoagulation with Coumadin due to atrial fibrillation .this was discontinued and patient had a prolonged seizure a week prior to presentation.    She had seizure disorder since September 2017 and did not tolerate phenytoin and has been on Keppra.  She has remained on a higher dose of Keppra.  Subsequently she went to the [facility] where EMG was done.  No seizure activity was noted and subsequently her Keppra dosage was reduced  Subsequently she had another seizure week prior to presentation.  She then re-presented to the hospital in the absence of any trauma or seizure with increased bruising hematuria.  She was admitted INR was 2.9 on admission  She was admitted with acute blood loss anemia with renal hemorrhage and breast hematoma.    She has an outpatient follow-up with both her surgeon as well as a urologist   Due to an episode of unresponsiveness she is on a higher dose of Keppra.  Levels were checked and they are within therapeutic range.Recheck Keppra levels ordered for a week or 24.  Unfortunately there is some concern of ongoing seizure-like activity for which she has no recall.  She was in therapy and had an absence seizure-like episode  which lasted almost for 2-3 minutes as per staff.  She has no recall  We did initiate some workup which all came back negative I have elected to increase her Keppra level further to 1 g twice daily and she remains event free but we have advised her to closely follow her neurology to discuss this.  She is not reporting any repeat episodes since then.  In addition she has a follow-up appointment with her neurologist to discuss this I did check some routine labs all of which came within norms.  She is not reporting any repeated seizure-like activity.  Her ecchymosis seems to be fading nicely.  She continues to have significant swelling in her left breast and was seen by her surgeon with no surgical intervention planned as of yet.  We have recommended repeating her ultrasound in 12 days at which time a decision will be made to intervene .  She is also had intermittent bradycardia for which she has been asymptomatic I did ask her to see cardiology regarding that but she is not too keen to pursue aggressive treatment at present I suspect this is not medication induced as she is not on any significant medications that could do that.  Patient again advised to see her primary care physician to discuss this if heart rates continue to be on the low side  She had low blood pressures consistently in the TCU with most systolics running less than 100 systolic blood pressure her amlodipine was discontinued and she now has parameters on the basis but she can administer her the medication  She is close to baseline.  She is discharging home with increased services her  unfortunately is getting more debilitated and has been diagnosed with what is suspected to be Lyme's disease.  She understands that she will need more services in order to function if this does not work she may be looking at respite care in assisted living facility  CPT4.9 ; [facility] 24/30    Review of Systems  Constitutional: Negative.  Negative for fever, chills,  has activity change, appetite change and fatigue.   Bps have been low  HENT: Negative for congestion and facial swelling.    Eyes: Negative for photophobia, redness and visual disturbance.   Respiratory: Negative for cough and chest tightness.    l breast hematoma  Cardiovascular: Negative for chest pain, palpitations and has leg swelling.   Gastrointestinal: Negative for nausea, diarrhea, constipation, blood in stool and abdominal distention.   Genitourinary: Negative.    Musculoskeletal: Negative.  She feels weaker than usual  Skin: Negative.    Neurological: Negative for dizziness, tremors, syncope, weakness, light-headedness and headaches.   Hematological: Does not bruise/bleed easily.   Psychiatric/Behavioral: Negative.    Mood is stable.  Vitals:    05/08/18 1023   BP: 118/69   Pulse: (!) 57   Temp: 98  F (36.7  C)   Weight: 142 lb (64.4 kg)       Physical Exam  GENERAL: no acute distress. Cooperative in conversation. Diffuse ecchymosis noted which is fading  HEENT: pupils are equal, round and reactive. Oral mucosa is moist and intact.  RESP:Chest symmetric. Regular respiratory rate. No stridor.  left  breast with a hematoma; fading with increased fluctuance  CVS: S1S2  ABD: Nondistended, soft.  EXTREMITIES: 2+ lower extremity edema.   NEURO: non focal. Alert and oriented x3. Left sided weakness; some left SIDED visual neglect  PSYCH: within normal limits. No depression or anxiety.  SKIN: warm dry intact extensive ecchymoses noted on the face; thorax and flank, this is  fading  Left breast continues to be larger than the right with significant hematoma still noted ' less tender  MEDICATION LIST:  Updated Medication list, printed and signed at discharge, please refer to that final medication list from the Skilled Nursing Facility for accuracy.    Amlodipine discontinued due to low blood pressures.  She has advised to administer herself with no amlodipine only systolic blood pressures are greater than 135 or  diastolic greater than 85  Keppra dosage was increased to 1 g twice a day due to absence seizure being reported.  Current Outpatient Prescriptions   Medication Sig     acetaminophen (TYLENOL) 500 MG tablet Take 500 mg by mouth every 6 (six) hours as needed for pain.     amLODIPine (NORVASC) 10 MG tablet Take 1 tablet (10 mg total) by mouth daily.     atorvastatin (LIPITOR) 40 MG tablet Take 40 mg by mouth at bedtime.     cholecalciferol, vitamin D3, 1,000 unit tablet Take 2,000 Units by mouth daily.      levETIRAcetam (KEPPRA) 500 MG tablet Take 500 mg by mouth in the morning and 1000 mg by mouth in the evening     sertraline (ZOLOFT) 50 MG tablet Take 50 mg by mouth every evening.      UBIDECARENONE (COQ-10 ORAL) Take 1 capsule by mouth daily.     Lab Results   Component Value Date    WBC 4.2 04/25/2018    HGB 9.9 (L) 04/25/2018    HCT 31.7 (L) 04/25/2018    MCV 98 04/25/2018     04/25/2018       DISCHARGE DIAGNOSIS:    ICD-10-CM    1. Paroxysmal atrial fibrillation I48.0    2. Hematuria, unspecified type R31.9    3. Acute blood loss anemia D62    4. Seizure disorder G40.909    .    MEDICAL EQUIPMENT NEEDS:  Walker/ANEUDY    DISCHARGE PLAN/FACE TO FACE:  I certify that services are/were furnished while this patient was under the care of a physician and that a physician or an allowed non-physician practitioner (NPP), had a face-to-face encounter that meets the physician face-to-face encounter requirements. The encounter was in whole, or in part, related to the primary reason for home health. The patient is confined to his/her home and needs intermittent skilled nursing, physical therapy, speech-language pathology, or the continued need for occupational therapy. A plan of care has been established by a physician and is periodically reviewed by a physician.  Date of Face-to-Face Encounter: 5/8/18    I certify that, based on my findings, the following services are medically necessary home health services:   PT/OT/HHA/RN    My clinical findings support the need for the above skilled services because: (Please write a brief narrative summary that describes what the RN, PT, SLP, or other services will be doing in the home. A list of diagnoses in this section does not meet the CMS requirements.)  CVA;SEIZURES    This patient is homebound because: (Please write a brief narrative summary describing the functional limitations as to why this patient is homebound and specifically what makes this patient homebound.)  WEAKNESS    The patient is, or has been, under my care and I have initiated the establishment of the plan of care. This patient will be followed by a physician who will periodically review the plan of care.    Schedule follow up visit with primary care provider within 7 days to reestablish care.  Total time spent was 35 minutes, more than half of it was in face-to-face counseling regarding disease state, treatment, side effects, documentation, review of clinical data and coordination of care  Electronically signed by: BENNY Bertrand

## 2021-06-17 NOTE — PROGRESS NOTES
[facility]      Code Status:  FULL CODE  Visit Type: Review Of Multiple Medical Conditions     Facility:  [facility] [[id number]]           History of Present Illness: Marcia Kelley is a 77 y.o. female who is currently admitted to the TCU as a transfer from the hospital.  . This is a 76-year-old with past medical history significant only for hypertension and who was completely independent with her ADLs using no assist device who presented to the hospitalin 2017  after a fall.  She was admitted with acute right MCA territory stroke associated with left-sided weakness , speech difficulty; confusion and collapse. She was given IV TPA and from back to me was attempted but was unsuccessful course was complicated by hemorrhage.  She also developed atrial fibrillation in the postoperative period.  She was in the TCU for rehab did quite well and was discharged independent with her ADLs to her home  She represented back to the hospital with increased bruising hematuria and a hemoglobin dropped down to 6.6.  She was on chronic anticoagulation with Coumadin due to atrial fibrillation this was discontinued and patient had a prolonged seizure a week prior to presentation.  She had seizure disorder since September 2017 and did not tolerate phenytoin and has been on Keppra.  She has remained on a higher dose of Keppra.  Subsequently she went to the [facility] where EMG was done.  No seizure activity was noted and subsequently her Keppra dosage was reduced  Subsequently she had another seizure week prior to presentation.  She then re-presented to the hospital in the absence of any trauma or seizure with increased bruising hematuria.  She was admitted INR was 2.9 on admission  She was admitted with acute blood loss anemia with renal hemorrhage and breast hematoma.    She has an outpatient follow-up with both her surgeon as well as a urologist   Due to an episode of  unresponsiveness she is on a higher dose of Keppra.  Levels were checked and they are within therapeutic range.  In addition she has a follow-up appointment with her neurologist to discuss this I did check some routine labs all of which came within norms.      Past Medical History:   Diagnosis Date     Acute right MCA stroke      Anorexia      Cranial neuropathies, multiple      Depressive disorder      Hemiplegia affecting left nondominant side      HTN (hypertension)      Hypokalemia      Hypovitaminosis D      Iliotibial band syndrome      Impaired mobility and activities of daily living      Influenza B      Paroxysmal atrial fibrillation      Seizure disorder     pt reports secondary to stroke.     Urinary incontinence      Past Surgical History:   Procedure Laterality Date     AUGMENTATION MAMMAPLASTY Bilateral      TONSILLECTOMY       Family History   Problem Relation Age of Onset     Heart attack Mother      Diabetes Mother      Heart attack Father      COPD Brother      Coronary artery disease Brother      Retinal detachment Brother      Peripheral vascular disease Brother      Tremor Brother      No Medical Problems Daughter      No Medical Problems Daughter      No Medical Problems Daughter      No Medical Problems Son      No Medical Problems Son      No Medical Problems Son      Social History     Social History     Marital status:      Spouse name: N/A     Number of children: N/A     Years of education: N/A     Occupational History     Not on file.     Social History Main Topics     Smoking status: Never Smoker     Smokeless tobacco: Never Used     Alcohol use Yes      Comment: occasional     Drug use: No     Sexual activity: Not on file     Other Topics Concern     Not on file     Social History Narrative    , [place]  Retired from business.  Six children, including Malia, DM educator.      Current Outpatient Prescriptions   Medication Sig Dispense Refill     acetaminophen (TYLENOL) 500  MG tablet Take 500 mg by mouth every 6 (six) hours as needed for pain.       amLODIPine (NORVASC) 10 MG tablet Take 1 tablet (10 mg total) by mouth daily. 90 tablet 3     atorvastatin (LIPITOR) 40 MG tablet Take 40 mg by mouth at bedtime.       cholecalciferol, vitamin D3, 1,000 unit tablet Take 2,000 Units by mouth daily.        levETIRAcetam (KEPPRA) 500 MG tablet Take 500 mg by mouth in the morning and 1000 mg by mouth in the evening  2     sertraline (ZOLOFT) 50 MG tablet Take 50 mg by mouth every evening.   3     UBIDECARENONE (COQ-10 ORAL) Take 1 capsule by mouth daily.       No current facility-administered medications for this visit.      No Known Allergies      Review of Systems:    Constitutional: Negative.  Negative for fever, chills, has activity change, appetite change and fatigue.   Bps have been low  HENT: Negative for congestion and facial swelling.    Eyes: Negative for photophobia, redness and visual disturbance.   Respiratory: Negative for cough and chest tightness.    l breast hematoma  Cardiovascular: Negative for chest pain, palpitations and has leg swelling.   Gastrointestinal: Negative for nausea, diarrhea, constipation, blood in stool and abdominal distention.   Genitourinary: Negative.    Musculoskeletal: Negative.  She feels weaker than usual  Skin: Negative.    Neurological: Negative for dizziness, tremors, syncope, weakness, light-headedness and headaches.   Hematological: Does not bruise/bleed easily.   Psychiatric/Behavioral: Negative.    Mood is frustrated and sad    /67 P67 T98    Physical Exam:    GENERAL: no acute distress. Cooperative in conversation. Diffuse ecchymosis noted which is fading  HEENT: pupils are equal, round and reactive. Oral mucosa is moist and intact.  RESP:Chest symmetric. Regular respiratory rate. No stridor.  left  breast with a hematoma  CVS: S1S2  ABD: Nondistended, soft.  EXTREMITIES: 2+ lower extremity edema.   NEURO: non focal. Alert and oriented  x3. Left sided weakness; some left SIDED visual neglect  PSYCH: within normal limits. No depression or anxiety.  SKIN: warm dry intact extensive ecchymoses noted on the face; thorax and flank  Left breast continues to be larger than the right with significant hematoma still noted ' less tender    Labs:    Recent Results (from the past 240 hour(s))   APTT(PTT)   Result Value Ref Range    PTT 30 24 - 37 seconds   Culture, Urine   Result Value Ref Range    Culture No Growth    Urinalysis   Result Value Ref Range    Color, UA Yellow Colorless, Yellow, Straw, Light Yellow    Clarity, UA Cloudy (!) Clear    Glucose, UA Negative Negative    Bilirubin, UA Negative Negative    Ketones, UA Negative Negative    Specific Gravity, UA 1.023 1.001 - 1.030    Blood, UA Moderate (!) Negative    pH, UA 5.0 4.5 - 8.0    Protein, UA Trace (!) Negative mg/dL    Urobilinogen, UA <2.0 E.U./dL <2.0 E.U./dL, 2.0 E.U./dL    Nitrite, UA Negative Negative    Leukocytes, UA Large (!) Negative    Bacteria, UA Many (!) None Seen hpf    RBC, UA 0-2 None Seen, 0-2 hpf    WBC, UA  (!) None Seen, 0-5 hpf    Squam Epithel, UA 5-10 (!) None Seen, 0-5 lpf    Trans Epithel, UA 5-10 (!) None Seen lpf    Mucus, UA Few (!) None Seen lpf    Hyaline Casts, UA 0-5 0-5, None Seen lpf   Basic Metabolic Panel   Result Value Ref Range    Sodium 140 136 - 145 mmol/L    Potassium 4.3 3.5 - 5.0 mmol/L    Chloride 107 98 - 107 mmol/L    CO2 23 22 - 31 mmol/L    Anion Gap, Calculation 10 5 - 18 mmol/L    Glucose 74 70 - 125 mg/dL    Calcium 8.3 (L) 8.5 - 10.5 mg/dL    BUN 21 8 - 28 mg/dL    Creatinine 0.68 0.60 - 1.10 mg/dL    GFR MDRD Af Amer >60 >60 mL/min/1.73m2    GFR MDRD Non Af Amer >60 >60 mL/min/1.73m2   HM2(CBC w/o Differential)   Result Value Ref Range    WBC 4.2 4.0 - 11.0 thou/uL    RBC 3.24 (L) 3.80 - 5.40 mill/uL    Hemoglobin 9.9 (L) 12.0 - 16.0 g/dL    Hematocrit 31.7 (L) 35.0 - 47.0 %    MCV 98 80 - 100 fL    MCH 30.6 27.0 - 34.0 pg    MCHC 31.2  (L) 32.0 - 36.0 g/dL    RDW 15.6 (H) 11.0 - 14.5 %    Platelets 271 140 - 440 thou/uL    MPV 11.4 8.5 - 12.5 fL   Levetiracetam [Keppra ]   Result Value Ref Range    Levetiracetam 29.0 6.0 - 46.0 ug/mL         Assessment/Plan:   ABLA-taken off anti-Coagulation with Coumadin. given multiple units of blood transfusion with stabilization of hemoglobin at 9.  This seems to be spontaneous hemorrhage with workup in the hospital being negative.  She had normal INR at 2.9.  Her platelets count were normal  R/C HG 9.9  Left breast hematoma secondary to bleeding seen by surgery and conservative treatment ,icing follow-up as an outpatient.  Hematuria in the setting of chronic anticoagulation CT showed hemorrhage into the left kidney and collecting system  Evaluated by urology.  She will have an outpatient cystoscopy and urethroscopy.  Currently denying any blood in urine    History of atrial fibrillation no longer felt to be a good candidate for Coumadin and has been recommended to go in for consideration for watchman's device as an outpatient and follow-up in cardiology  As per cardiology she will need 6-8 weeks of anti-Coagulation postprocedure.  History of seizure disorder felt to be recurrent by neurology while during this hospitalization -again this is controversial .  she was seen by [facility] and EMG was negative  However in light of repeated seizure-like activity they have increased her Keppra and recommended outpatient follow-up  Recheck Keppra levels ordered for a week or 24.  Unfortunately there is some concern of ongoing seizure-like activity for which she has no recall.  She was in therapy and had an absence seizure-like episode which lasted almost for 2-3 minutes as per staff.  She has no recall  We did initiate some workup which all came back negative I have elected to increase her Keppra level further to 1 g twice daily and she remains event free but we have advised her to closely follow her  neurology to discuss this  Bradycardia intermittent in nature.  Unclear etiology and wondering if that cause her to have an absence seizure continue to monitor heart rates closely if need be she may need cardiac monitoring.  I did talk to the patient about this and she is not very keen to pursue this at this minute    Status post CVA old with left-sided hemiparesis doing quite well.  History of depression/mood disorder with emotional lability- she takes sertraline  Vitamin D deficiency on supplements  Hyperlipidemia she is on atorvastatin  Hypertension with low blood pressures documented in the TCU currently on amlodipine 10 mg daily   Blood pressures are low most of the days and medication is held  After discussing this with her I am going to discontinue her scheduled amlodipine.  We will give her amlodipine 5 mg only if systolic blood pressures greater than 145 or diastolic is greater than 90 and continue to monitor she has stability to check her blood pressures at home to  Debilitation currently in the TCU for rehab  Monitor closely for any repeat seizure-like activity in the meantime she will see her neurologist ASAP  Slums 24/30 balance score is 13/28 indicating high fall risk  Total time spent was 35 minutes, more than half of it was in face-to-face counseling regarding disease state, treatment, side effects, documentation, review of clinical data and coordination of care    Electronically signed by: BENNY Bertrand  This progress note was completed using Dragon software and there may be grammatical errors.

## 2021-06-18 NOTE — PROGRESS NOTES
Office Visit - Follow Up   Marcia Kelley   77 y.o. female    Date of Visit: 6/1/2018    Chief Complaint   Patient presents with     Fatigue     Dizziness        Assessment and Plan   1. Cerebral infarction involving right middle cerebral artery  Cardioembolic, we need to decide if she should go back on anticoagulation.  Will check a hemoglobin as well as a renal ultrasound.  If these look okay I think she can go back on them we would consider a novel anticoagulant.  Additionally I think should be a good candidate for left atrial occlusion device.  We will have her see cardiology.    2. Essential hypertension  Blood pressure controlled continue current medication    3. Paroxysmal atrial fibrillation  As above  - Ambulatory referral to Cardiology  - INR    4. Seizure disorder  Continue Kera, follow-up neurology    5. Posttraumatic hematoma of breast, left, initial encounter  Per chucho Bowen    6. Acute blood loss anemia  - HM2(CBC w/o Differential)  - Ferritin    7. Renal hemorrhage, left  - US Kidney Left; Future  - Ambulatory referral to Urology  - Basic Metabolic Panel; Future  - Urinalysis-UC if Indicated  - Basic Metabolic Panel    Return in about 4 weeks (around 6/29/2018) for recheck.     History of Present Illness   This 77 y.o. old woman comes in for follow-up.  She had been at nursing facility after she had a seizure and spontaneous hematoma into the left breast and left kidney.  She seems to have recovered nicely.  She had home physical therapy occupational therapy and now is looking to do outpatient therapy at [facility].  She has had no more blood in her urine.  She still is a hematoma in the left breast and recent follow-up with Dr. Bam Solano.  She also has a rupture of her silicone implant.  No surgical intervention planned.  She is not on anticoagulation and has atrial fibrillation and has had previous cardioembolic stroke.  She is at high risk for recurrent stroke.  Last time  "her hemoglobin was checked and was 9.9.  She has not yet followed up with urology about her left renal hemorrhage.    Review of Systems: A comprehensive review of systems was negative except as noted.     Medications, Allergies and Problem List   Reviewed and updated     Physical Exam   General Appearance:   No acute distress    /62 (Patient Site: Right Arm, Patient Position: Sitting, Cuff Size: Adult Regular)  Pulse 66  Ht 5' 8\" (1.727 m)  Wt 143 lb (64.9 kg)  SpO2 94%  BMI 21.74 kg/m2    HEENT exam is unremarkable  Neck supple no thyromegaly or nodule palpable  Lymphatic no cervical lymphadenopathy  Cardiovascular regular rate and rhythm no murmur gallop or rub, chronic swelling of the left lower extremity  Pulmonary lungs are clear to auscultation bilaterally  Gastrointestinall abdomen soft nontender nondistended no organomegaly  Neurologic exam is stable  Psychiatric pleasant, no confusion or agitation        Additional Information   Current Outpatient Prescriptions   Medication Sig Dispense Refill     acetaminophen (TYLENOL) 500 MG tablet Take 500 mg by mouth every 6 (six) hours as needed for pain.       amLODIPine (NORVASC) 10 MG tablet Take 1 tablet (10 mg total) by mouth daily. (Patient taking differently: Take 5 mg by mouth daily. ) 90 tablet 3     atorvastatin (LIPITOR) 40 MG tablet Take 40 mg by mouth at bedtime.       cholecalciferol, vitamin D3, 1,000 unit tablet Take 2,000 Units by mouth daily.        levETIRAcetam (KEPPRA) 500 MG tablet Take 500 mg by mouth in the morning and 1000 mg by mouth in the evening  2     LORazepam (ATIVAN) 0.5 MG tablet Take 0.5 mg by mouth daily as needed for anxiety.       sertraline (ZOLOFT) 50 MG tablet Take 50 mg by mouth every evening.   3     UBIDECARENONE (COQ-10 ORAL) Take 1 capsule by mouth daily.       No current facility-administered medications for this visit.      No Known Allergies  Social History   Substance Use Topics     Smoking status: Never " Smoker     Smokeless tobacco: Never Used     Alcohol use Yes      Comment: occasional       Review and/or order of clinical lab tests:  Review and/or order of radiology tests:  Review and/or order of medicine tests:  Discussion of test results with performing physician:  Decision to obtain old records and/or obtain history from someone other than the patient:  Review and summarization of old records and/or obtaining history from someone other than the patient and.or discussion of case with another health care provider:  Independent visualization of image, tracing or specimen itself:    Time:      Khurram Saldana MD

## 2021-06-22 NOTE — H&P
"[facility]    History and Physical  Hospitalist       Date of Admission:  3/21/2017  Date of Service (when I saw the patient): 03/21/17    Assessment & Plan   Marcia Kelley is a 76 year old female with no known significant medical hx except for possible HTN and is on a baby aspirin daily who presents with sudden onset left sided weakness and collapse.     Acute right MCA territory stroke  Associated with sudden onset left sided weakness, facial droop, slurred speech, confusion and collapsed. CTA as below. S/p iv tPA and and IR thrombectomy was attempted but was unsuccessful due to the anatomy of her prox R CCA with tortuosity and a significant 360 degree loop.         - S/p iv tPA.  Will admit to the ICU for close monitoring with tPA protocol in place.    - Appreciate Neurology consultation.    - IVF, NPO, pain control.     - No AC or Asa x 24 hours.    - Keep SBP < 180 and DBP < 105.  IV prn's and iv Nicardipine available if needed.    - Telemetry, Echocardiogram.   - PT/OT when appropriate.   - Speech eval.    - FLP in am.    - HgA1c    Possible HTN  She denies any medical hx but her  reports a longstanding hx of HTN.  She does not go to doctors as she thinks they are \"of no use\" according to her . BP mildly elevated her in the ER.    - Will monitor and treat as appropriate based on the tPA protocol for now.    - Will likely need to initiate antihypertensive therapy on d/c.        DVT Prophylaxis: Pneumatic Compression Devices  Code Status: Full Code    Disposition:  Pending clinical coarse.  Admit to the ICU for acute CVA s/p TPA.      Clifton Moore       Primary Care Physician   *No primary care provider on file.    Chief Complaint   Collapse with left sided weakness.     History is obtained from EMS, ER documentation and her .    History of Present Illness   Marcia Kelley is a 76 year old female with no known significant medical hx except for HTN and is on a " Chief Complaint   Patient presents with     Follow Up     Pt's granddaughter states memory has declined     Rosemary Wright LPN on 6/22/2021 at 11:03 AM     baby aspirin daily who presents with sudden onset left sided weakness and collapse.  The [facility] [employer] [facility] an upcoming [facility] and was in the dressing room when she experienced left sided weakness, facial droop, slurred speech, confusion and collapsed to the ground.  Staff heard her fall and when she was found on the ground 911 was called. No seizure like activity was reported.  On EMS evaluation she was responsive but confused and had left arm paresis. Here in the ER her symptoms persisted and she remained confused thinking she was still at [facility] and was showing left sided neglect.  The time of onset was about 12:50pm.  Code stroke was called at 1:25 pm.  Evaluation in the emergency department shows an elderly female with a blood pressure 166/82, pulse of 71, afebrile and saturating at 97% on room air.  Routine labs showed an unremarkable BMP, glucose of 167 and an unremarkable CBC.  INR 0.95.  CT scan of her head without contrast was negative for any acute process.  CT head with contrast showed moderate to large right posterior division middle cerebral artery territory matched defect consistent with an infarct.  CT angiogram of head and neck showed a filling defect versus short segment high-grade stenosis involving the distal right M1 branch with poor filling of the right M2 branch.  Finding is probably due to thromboembolus.  She was evaluated by neurology in the emergency department was within the window for IV TPA.  There was no family on site but her  was called and the risk and benefits were discussed with him over the phone and he consented to IV TPA.  IV TPA was initiated and she was sent to interventional radiology for possible thrombectomy. She will be transferred to the ICU after IR. On my exam in ICU she has persistent LUE weakness with reduced  strength but is able to  most of her arm.  No LLE weakness noted. No facial droop but does have some very  mild slurred speech but is mildly sedated from her procedure so unclear if related to her cva.          Past Medical History    I have reviewed this patient's medical history and updated it with pertinent information if needed.    - No known significant medical hx reported or on file.     Past Surgical History   I have reviewed this patient's surgical history and updated it with pertinent information if needed.  History reviewed. No pertinent past surgical history.    Prior to Admission Medications   Prior to Admission Medications   Prescriptions Last Dose Informant Patient Reported? Taking?   ASPIRIN PO   Yes Yes      Facility-Administered Medications: None     Allergies   No Known Allergies    Social History   I have reviewed this patient's social history and updated it with pertinent information if needed. Marcia Kelley  reports that she has never smoked. She does not have any smokeless tobacco history on file. She reports that she drinks alcohol. She reports that she does not use illicit drugs.    Family History   I have reviewed this patient's family history and updated it with pertinent information if needed.    - No pertinent family medical hx.     Review of Systems   The 10 point Review of Systems is negative other than noted in the HPI or here.     Physical Exam     Temp src: Oral BP: 166/90 Pulse: 70 Heart Rate: 58 Resp: 16 SpO2: 100 % O2 Device: None (Room air)    Vital Signs with Ranges  Pulse:  [70] 70  Heart Rate:  [58-71] 58  Resp:  [14-18] 16  BP: (154-166)/(82-98) 166/90  SpO2:  [96 %-100 %] 100 %  179 lbs 14.33 oz    Constitutional: Elderly white female. Awake but mildly sedated, alert, cooperative, no apparent distress.  Eyes: Conjunctiva and pupils examined and normal.  HEENT: Moist mucous membranes, normal dentition.   Respiratory: Clear to auscultation bilaterally, no crackles or wheezing.  Cardiovascular: Regular rhythm, bradycardic, normal S1 and S2, and no murmur noted.  GI: Soft,  non-distended, non-tender, normal bowel sounds.  Lymph/Hematologic: No anterior cervical or supraclavicular adenopathy.  Skin: No rashes, no cyanosis, no edema.  Musculoskeletal: No joint swelling, erythema or tenderness.  Neurologic: + 3-4/5 LUE weakness and incoordination with reduced  strength. Sensation intact. No facial droop, PERRLA. Mild slurred speech. NO other focal deficits.     Psychiatric: Alert, oriented to person, place, no obvious anxiety or depression.    Data   Data reviewed today:  I personally reviewed the head CT image(s) showing with moderate to large posterioir division middle cerebral artery territory matched defect consistent with infarct.     Recent Labs  Lab 03/21/17  1327   WBC 4.5   HGB 13.7   MCV 96      INR 0.95      POTASSIUM 3.8   CHLORIDE 108   CO2 28   BUN 23   CR 0.80   ANIONGAP 7   RADHA 8.5   *       Recent Results (from the past 24 hour(s))   CT Head w/o Contrast    Narrative    CT SCAN OF THE HEAD WITHOUT CONTRAST   3/21/2017 1:45 PM     HISTORY: Stroke.    TECHNIQUE: Axial images of the head and coronal reformations without  IV contrast material. Radiation dose for this scan was reduced using  automated exposure control, adjustment of the mA and/or kV according  to patient size, or iterative reconstruction technique.    COMPARISON: None.    FINDINGS: There is some borderline mild cerebral atrophy. Minimal  nonspecific white matter changes are present in both hemispheres  without mass effect. There is no evidence for intracranial hemorrhage,  mass effect, acute infarct, or a skull fracture.      Impression    IMPRESSION: Minimal chronic changes. No evidence for intracranial  hemorrhage or any acute process.      DAVONTE REYES MD   CT Head w Contrast    Narrative    CT HEAD WITH CONTRAST 3/21/2017 2:02 PM     HISTORY: Stroke.    TECHNIQUE: Axial images were obtained through the brain with  intravenous contrast. 50 mL of Isovue-370 was given.  Multiplanar  reconstructions were performed for CT perfusion imaging. Radiation  dose for this scan was reduced using automated exposure control,  adjustment of the mA and/or kV according to patient size, or iterative  reconstruction technique..    FINDINGS: Cerebral blood flow, cerebral blood volume, and mean transit  time maps show a moderate to large matched perfusion defect in the  posterior division of the right middle cerebral artery territory  consistent with an infarct. Remainder of perfusion appears normal.      Impression    IMPRESSION: Moderate to large right posterior division middle cerebral  artery territory matched defect consistent with infarct.    DAVONTE REYES MD

## 2021-06-25 NOTE — PROGRESS NOTES
Progress Notes by Ksenia Paulson CNP at 6/2/2017  9:34 PM     Author: Ksenia Paulson CNP Service: -- Author Type: Nurse Practitioner    Filed: 6/3/2017 11:04 AM Encounter Date: 6/2/2017 Status: Attested    : Ksenia Paulson CNP (Nurse Practitioner) Cosigner: Makeda Morton MBBS at 6/5/2017  4:56 PM    Attestation signed by Makeda Morton MBBS at 6/5/2017  4:56 PM    Agree with above discharge note                 [facility]    Facility:   [facility] [[id number]]   Code Status: FULL CODE  PCP: No Primary Care Provider   Phone: None   Fax: [phone]      CHIEF COMPLAINT/REASON FOR VISIT:  Chief Complaint   Patient presents with   ? Discharge Summary       HISTORY COURSE:  Marcia Kelley is a 76 y.o. female who is readmitted to the TCU from hospital with h/o of CVA  SHe has a  past medical history significant only for hypertension and who was completely independent with her ADLs using no assist device prior to her recent stroke.  She was admitted with acute right MCA territory stroke associated with left-sided weakness , speech difficulty; confusion and collapse. She was given IV TPA and a thrombectomy was attempted but was unsuccessful.   She was initially  on aspirin and then switched over to Coumadin.   Currently she is on 2.5 mg daily with her next INR check due 6/8/17. Patient is progressing in therapy and ambulating with a sujatha walker. She is wearing a  KAFO  brace but at wheelchair level she is contact-guard to standby assist with all her transfers now.  Today she is seen in her room. She is peasant and in no acute distress. She denies CP or SOB, She reports positive BM's. Her mood is positive. SHe talked with me about being upset and sad over her recent stroke but then  realized she was alive and should be thankful for that. She is optimistic about the progress she is making in therapy.       Review of Systems   Constitutional: Positive for activity change.  Negative for appetite change, chills, fatigue and fever.        She now requires the use of a sujatha walker    HENT: Negative for congestion and sore throat.    Respiratory: Negative for cough, shortness of breath and wheezing.    Cardiovascular: Positive for leg swelling. Negative for chest pain.   Gastrointestinal: Negative for abdominal distention, abdominal pain, constipation, diarrhea and nausea.   Genitourinary: Negative for dysuria.   Musculoskeletal: Positive for gait problem. Negative for arthralgias and myalgias.        Left sided hemiparesis   Skin: Negative for color change, rash and wound.   Neurological: Negative for dizziness and weakness.   Psychiatric/Behavioral: Negative for agitation, behavioral problems and sleep disturbance.        Currently on low dose zoloft       Vitals:    06/02/17 2134   BP: 115/63   Pulse: 63   Temp: 99.3  F (37.4  C)   SpO2: 94%   Weight: 156 lb 6.4 oz (70.9 kg)       Physical Exam   Constitutional: She is oriented to person, place, and time. She appears well-developed and well-nourished.   HENT:   Head: Normocephalic and atraumatic.   Eyes: Conjunctivae are normal.   Neck: Normal range of motion. No tracheal deviation present.   Cardiovascular: Normal rate, regular rhythm and normal heart sounds.  Exam reveals no gallop.    No murmur heard.  Pulmonary/Chest: Breath sounds normal. No respiratory distress. She has no wheezes. She has no rales.   Abdominal: Soft. Bowel sounds are normal. She exhibits no distension. There is no tenderness.   Musculoskeletal: She exhibits edema.   1+ LLE  Left sided hemiparesis   Neurological: She is alert and oriented to person, place, and time.   Skin: Skin is warm and dry.   Psychiatric: She has a normal mood and affect. Her behavior is normal.       MEDICATION LIST:  Current Outpatient Prescriptions   Medication Sig   ? acetaminophen (TYLENOL) 325 MG tablet Take 650 mg by mouth every 4 (four) hours as needed for pain.   ? amLODIPine  (NORVASC) 10 MG tablet Take 10 mg by mouth daily.   ? ondansetron (ZOFRAN) 4 MG tablet Take 4 mg by mouth every 6 (six) hours as needed for nausea.   ? senna-docusate (PERICOLACE) 8.6-50 mg tablet Take 1 tablet by mouth every 12 (twelve) hours as needed.   ? sertraline (ZOLOFT) 25 MG tablet Take 25 mg by mouth at bedtime.   ? coenzyme Q10 100 mg capsule Take 100 mg by mouth daily.   ? lidocaine (LIDODERM) 5 % Place 1 patch on the skin daily.   ? mirtazapine (REMERON) 30 MG tablet Take 30 mg by mouth at bedtime.   ? senna-docusate (SENNOSIDES-DOCUSATE SODIUM) 8.6-50 mg tablet Take 1 tablet by mouth 2 (two) times a day.   ? WARFARIN SODIUM (WARFARIN ORAL) Take by mouth. 5/15/17 INR 2.6. Take 2.5mg daily. Next INR 5/18/17 5/12/17 INR 4.4. Hold. Next INR 5/15/17  5/1/17 INR 4.6. Hold 5/1. Next INR 5/2 4/27/17 INR 3.94. On call said give 4.5mg. 4/28/17 Hold X2 then 4mg daily. Next INR 5/1 4/24/17 5mg daily recheck INR 4/25/17 4/19/17 5mg daily       DISCHARGE DIAGNOSIS:    ICD-10-CM    1. Fatigue R53.81     R53.83    2. Sprain of ankle S93.409A        MEDICAL EQUIPMENT NEEDS:  Justo- walker and KAFO brace    DISCHARGE PLAN/FACE TO FACE:  I certify that services are/were furnished while this patient was under the care of a physician and that a physician or an allowed non-physician practitioner (NPP), had a face-to-face encounter that meets the physician face-to-face encounter requirements. The encounter was in whole, or in part, related to the primary reason for home health. The patient is confined to his/her home and needs intermittent skilled nursing, physical therapy, speech-language pathology, or the continued need for occupational therapy. A plan of care has been established by a physician and is periodically reviewed by a physician.  Date of Face-to-Face Encounter: 6/2/17    I certify that, based on my findings, the following services are medically necessary home health services: PT/OT    My clinical findings  support the need for the above skilled services because: Pt with a recent stroke and left sided hemiparesis. She will need PT/OT for strength building and endurance for optimal ambulation and continued support to adapt to ADL's with new limitation.      This patient is homebound because: She is unable to leave the home unassisted. She has a new limitation of left sided hemiparesis making her a high fall risk    The patient is, or has been, under my care and I have initiated the establishment of the plan of care. This patient will be followed by a physician who will periodically review the plan of care.      Electronically signed by: Ksenia Paulson CNP

## 2021-07-03 NOTE — ADDENDUM NOTE
Addendum Note by Khurram Fuller MD at 3/11/2018  9:46 AM     Author: Khurram Fuller MD Service: -- Author Type: Physician    Filed: 3/11/2018  9:46 AM Encounter Date: 3/9/2018 Status: Signed    : Khurram Fuller MD (Physician)    Addended by: KHURRAM FULLER on: 3/11/2018 09:46 AM        Modules accepted: Orders

## 2021-07-14 PROBLEM — N17.9 AKI (ACUTE KIDNEY INJURY) (H): Status: RESOLVED | Noted: 2017-01-01 | Resolved: 2017-01-01

## 2021-07-14 PROBLEM — M62.82 RHABDOMYOLYSIS: Status: RESOLVED | Noted: 2018-01-01 | Resolved: 2018-01-01

## 2021-08-03 PROBLEM — I82.409 DVT (DEEP VENOUS THROMBOSIS) (H): Status: RESOLVED | Noted: 2017-01-01 | Resolved: 2017-01-01

## 2021-08-03 PROBLEM — R56.9 SEIZURE (H): Status: RESOLVED | Noted: 2018-01-01 | Resolved: 2018-01-01

## 2021-08-03 PROBLEM — G40.201: Status: RESOLVED | Noted: 2017-01-01 | Resolved: 2017-01-01

## 2021-08-03 PROBLEM — G40.109 SIMPLE PARTIAL SEIZURE DISORDER (H): Status: RESOLVED | Noted: 2018-01-01 | Resolved: 2018-01-01

## 2021-08-03 PROBLEM — G40.109: Status: RESOLVED | Noted: 2017-01-01 | Resolved: 2018-01-01
